# Patient Record
Sex: FEMALE | Race: WHITE | Employment: OTHER | URBAN - METROPOLITAN AREA
[De-identification: names, ages, dates, MRNs, and addresses within clinical notes are randomized per-mention and may not be internally consistent; named-entity substitution may affect disease eponyms.]

---

## 2017-02-27 ENCOUNTER — TELEPHONE (OUTPATIENT)
Dept: FAMILY MEDICINE CLINIC | Facility: CLINIC | Age: 67
End: 2017-02-27

## 2017-02-27 DIAGNOSIS — M25.50 ARTHRALGIA, UNSPECIFIED JOINT: ICD-10-CM
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
## 2017-03-21 ENCOUNTER — OFFICE VISIT (OUTPATIENT)
Dept: FAMILY MEDICINE CLINIC | Age: 67
End: 2017-03-21
Payer: MEDICARE

## 2017-03-21 VITALS
DIASTOLIC BLOOD PRESSURE: 82 MMHG | TEMPERATURE: 97.9 F | WEIGHT: 191 LBS | SYSTOLIC BLOOD PRESSURE: 118 MMHG | BODY MASS INDEX: 29.04 KG/M2

## 2017-03-21 DIAGNOSIS — K64.1 SECOND DEGREE HEMORRHOIDS: ICD-10-CM

## 2017-03-21 DIAGNOSIS — K62.5 RECTAL BLEEDING: Primary | ICD-10-CM

## 2017-03-21 LAB
HEMOCCULT STL QL: NORMAL

## 2017-03-21 PROCEDURE — 3017F COLORECTAL CA SCREEN DOC REV: CPT | Performed by: FAMILY MEDICINE

## 2017-03-21 PROCEDURE — 1090F PRES/ABSN URINE INCON ASSESS: CPT | Performed by: FAMILY MEDICINE

## 2017-03-21 PROCEDURE — 1123F ACP DISCUSS/DSCN MKR DOCD: CPT | Performed by: FAMILY MEDICINE

## 2017-03-21 PROCEDURE — 4040F PNEUMOC VAC/ADMIN/RCVD: CPT | Performed by: FAMILY MEDICINE

## 2017-03-21 PROCEDURE — G8420 CALC BMI NORM PARAMETERS: HCPCS | Performed by: FAMILY MEDICINE

## 2017-03-21 PROCEDURE — 3014F SCREEN MAMMO DOC REV: CPT | Performed by: FAMILY MEDICINE

## 2017-03-21 PROCEDURE — G8427 DOCREV CUR MEDS BY ELIG CLIN: HCPCS | Performed by: FAMILY MEDICINE

## 2017-03-21 PROCEDURE — 1036F TOBACCO NON-USER: CPT | Performed by: FAMILY MEDICINE

## 2017-03-21 PROCEDURE — G8400 PT W/DXA NO RESULTS DOC: HCPCS | Performed by: FAMILY MEDICINE

## 2017-03-21 PROCEDURE — 82270 OCCULT BLOOD FECES: CPT | Performed by: FAMILY MEDICINE

## 2017-03-21 PROCEDURE — G8484 FLU IMMUNIZE NO ADMIN: HCPCS | Performed by: FAMILY MEDICINE

## 2017-03-21 PROCEDURE — 99213 OFFICE O/P EST LOW 20 MIN: CPT | Performed by: FAMILY MEDICINE

## 2017-03-21 RX ORDER — BUDESONIDE AND FORMOTEROL FUMARATE DIHYDRATE 160; 4.5 UG/1; UG/1
AEROSOL RESPIRATORY (INHALATION)
COMMUNITY
Start: 2017-02-03 | End: 2019-02-14 | Stop reason: ALTCHOICE

## 2017-03-21 ASSESSMENT — ENCOUNTER SYMPTOMS
NAUSEA: 0
RECTAL PAIN: 0
VOMITING: 0
HEMATOCHEZIA: 1
SHORTNESS OF BREATH: 1
CONSTIPATION: 1
ABDOMINAL PAIN: 0
COUGH: 0
BLOOD IN STOOL: 1
DIARRHEA: 0
HEARTBURN: 0

## 2017-04-17 DIAGNOSIS — M25.50 ARTHRALGIA, UNSPECIFIED JOINT: ICD-10-CM
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
## 2017-04-20 ENCOUNTER — OFFICE VISIT (OUTPATIENT)
Dept: FAMILY MEDICINE CLINIC | Age: 67
End: 2017-04-20
Payer: MEDICARE

## 2017-04-20 VITALS
SYSTOLIC BLOOD PRESSURE: 138 MMHG | BODY MASS INDEX: 30.37 KG/M2 | HEART RATE: 72 BPM | WEIGHT: 189 LBS | DIASTOLIC BLOOD PRESSURE: 82 MMHG | HEIGHT: 66 IN

## 2017-04-20 DIAGNOSIS — M48.061 SPINAL STENOSIS AT L4-L5 LEVEL: ICD-10-CM

## 2017-04-20 DIAGNOSIS — Z12.31 SCREENING MAMMOGRAM, ENCOUNTER FOR: ICD-10-CM

## 2017-04-20 DIAGNOSIS — I36.1 NON-RHEUMATIC TRICUSPID VALVE INSUFFICIENCY: ICD-10-CM

## 2017-04-20 DIAGNOSIS — N95.1 MENOPAUSE SYNDROME: ICD-10-CM

## 2017-04-20 DIAGNOSIS — R29.898 WEAKNESS OF RIGHT LOWER EXTREMITY: Primary | ICD-10-CM

## 2017-04-20 PROCEDURE — G8400 PT W/DXA NO RESULTS DOC: HCPCS | Performed by: FAMILY MEDICINE

## 2017-04-20 PROCEDURE — 1090F PRES/ABSN URINE INCON ASSESS: CPT | Performed by: FAMILY MEDICINE

## 2017-04-20 PROCEDURE — G8427 DOCREV CUR MEDS BY ELIG CLIN: HCPCS | Performed by: FAMILY MEDICINE

## 2017-04-20 PROCEDURE — 1123F ACP DISCUSS/DSCN MKR DOCD: CPT | Performed by: FAMILY MEDICINE

## 2017-04-20 PROCEDURE — 4040F PNEUMOC VAC/ADMIN/RCVD: CPT | Performed by: FAMILY MEDICINE

## 2017-04-20 PROCEDURE — 3014F SCREEN MAMMO DOC REV: CPT | Performed by: FAMILY MEDICINE

## 2017-04-20 PROCEDURE — 3017F COLORECTAL CA SCREEN DOC REV: CPT | Performed by: FAMILY MEDICINE

## 2017-04-20 PROCEDURE — 99213 OFFICE O/P EST LOW 20 MIN: CPT | Performed by: FAMILY MEDICINE

## 2017-04-20 PROCEDURE — G8417 CALC BMI ABV UP PARAM F/U: HCPCS | Performed by: FAMILY MEDICINE

## 2017-04-20 PROCEDURE — 1036F TOBACCO NON-USER: CPT | Performed by: FAMILY MEDICINE

## 2017-04-20 ASSESSMENT — ENCOUNTER SYMPTOMS
BACK PAIN: 0
ABDOMINAL PAIN: 0
WHEEZING: 0
COUGH: 0
BLOOD IN STOOL: 0
HEMATOCHEZIA: 1
SHORTNESS OF BREATH: 1
EYES NEGATIVE: 1

## 2017-05-02 LAB
DLCO %PRED: NORMAL
DLCO PRE: NORMAL
FEF 25-75%-POST: NORMAL
FEF 25-75%-PRE: NORMAL
FEV1-POST: NORMAL
FEV1-PRE: NORMAL
FEV1/FVC-POST: NORMAL
FEV1/FVC-PRE: NORMAL
FVC-POST: NORMAL
FVC-PRE: NORMAL
MEP: NORMAL
MIP: NORMAL
TLC %PRED: NORMAL
TLC PRE: NORMAL

## 2017-05-28 RX ORDER — MELOXICAM 15 MG/1
TABLET ORAL
Qty: 90 TABLET | Refills: 0 | Status: SHIPPED | OUTPATIENT
Start: 2017-05-28 | End: 2017-08-16 | Stop reason: SDUPTHER

## 2017-08-07 ENCOUNTER — TELEPHONE (OUTPATIENT)
Dept: FAMILY MEDICINE CLINIC | Age: 67
End: 2017-08-07

## 2017-08-17 RX ORDER — MELOXICAM 15 MG/1
TABLET ORAL
Qty: 90 TABLET | Refills: 1 | Status: SHIPPED | OUTPATIENT
Start: 2017-08-17 | End: 2018-01-30 | Stop reason: SDUPTHER

## 2017-09-27 ENCOUNTER — HOSPITAL ENCOUNTER (OUTPATIENT)
Age: 67
Setting detail: SPECIMEN
Discharge: HOME | End: 2017-09-27
Payer: MEDICARE

## 2017-09-29 LAB — DERMATOLOGY PATHOLOGY REPORT: NORMAL

## 2018-01-31 RX ORDER — MELOXICAM 15 MG/1
TABLET ORAL
Qty: 90 TABLET | Refills: 0 | Status: SHIPPED | OUTPATIENT
Start: 2018-01-31 | End: 2018-04-21 | Stop reason: SDUPTHER

## 2018-01-31 NOTE — TELEPHONE ENCOUNTER
Pharmacy request  Health Maintenance   Topic Date Due    Hepatitis C screen  1950    Diabetes screen  01/01/1989    Zostavax vaccine  01/01/2009    Breast cancer screen  08/12/2013    DEXA (modify frequency per FRAX score)  01/01/2014    Pneumococcal low/med risk (1 of 2 - PCV13) 01/01/2014    Potassium monitoring  02/12/2016    Creatinine monitoring  02/12/2016    Flu vaccine (1) 09/01/2017    Colon Cancer Screen FIT/FOBT  03/21/2018    Lipid screen  08/06/2019    DTaP/Tdap/Td vaccine (3 - Td) 01/01/2023             (applicable per patient's age: Cancer Screenings, Depression Screening, Fall Risk Screening, Immunizations)    BUN (mg/dL)   Date Value   02/12/2015 19      (goal A1C is < 7)   (goal LDL is <100) need 30-50% reduction from baseline     BP Readings from Last 3 Encounters:   04/20/17 138/82   03/21/17 118/82   12/19/16 130/72    (goal /80)      All Future Testing planned in [facility]:  Lab Frequency Next Occurrence   ROSELYN Digital Screen Bilateral Once 08/05/2017   DEXA Bone Density Axial Skeleton Once 08/05/2017       Next Visit Date:  No future appointments.          Patient Active Problem List:     Total knee replacement status     Weakness of right lower extremity     Paroxysmal a-fib (HCC)     Primary osteoarthritis involving multiple joints     Dysthymia     Tricuspid regurgitation

## 2018-02-15 ENCOUNTER — OFFICE VISIT (OUTPATIENT)
Dept: FAMILY MEDICINE CLINIC | Age: 68
End: 2018-02-15
Payer: MEDICARE

## 2018-02-15 VITALS
BODY MASS INDEX: 26.98 KG/M2 | HEART RATE: 78 BPM | SYSTOLIC BLOOD PRESSURE: 134 MMHG | WEIGHT: 178 LBS | HEIGHT: 68 IN | DIASTOLIC BLOOD PRESSURE: 86 MMHG

## 2018-02-15 DIAGNOSIS — R06.09 DYSPNEA ON EXERTION: ICD-10-CM

## 2018-02-15 DIAGNOSIS — I48.0 PAROXYSMAL ATRIAL FIBRILLATION (HCC): ICD-10-CM

## 2018-02-15 DIAGNOSIS — M48.061 SPINAL STENOSIS AT L4-L5 LEVEL: ICD-10-CM

## 2018-02-15 DIAGNOSIS — G47.33 OBSTRUCTIVE SLEEP APNEA SYNDROME: ICD-10-CM

## 2018-02-15 DIAGNOSIS — S46.911A SHOULDER STRAIN, RIGHT, INITIAL ENCOUNTER: Primary | ICD-10-CM

## 2018-02-15 DIAGNOSIS — M15.9 PRIMARY OSTEOARTHRITIS INVOLVING MULTIPLE JOINTS: ICD-10-CM

## 2018-02-15 PROCEDURE — G8400 PT W/DXA NO RESULTS DOC: HCPCS | Performed by: FAMILY MEDICINE

## 2018-02-15 PROCEDURE — 1036F TOBACCO NON-USER: CPT | Performed by: FAMILY MEDICINE

## 2018-02-15 PROCEDURE — 99214 OFFICE O/P EST MOD 30 MIN: CPT | Performed by: FAMILY MEDICINE

## 2018-02-15 PROCEDURE — 1123F ACP DISCUSS/DSCN MKR DOCD: CPT | Performed by: FAMILY MEDICINE

## 2018-02-15 PROCEDURE — 3014F SCREEN MAMMO DOC REV: CPT | Performed by: FAMILY MEDICINE

## 2018-02-15 PROCEDURE — 4040F PNEUMOC VAC/ADMIN/RCVD: CPT | Performed by: FAMILY MEDICINE

## 2018-02-15 PROCEDURE — G8484 FLU IMMUNIZE NO ADMIN: HCPCS | Performed by: FAMILY MEDICINE

## 2018-02-15 PROCEDURE — 3017F COLORECTAL CA SCREEN DOC REV: CPT | Performed by: FAMILY MEDICINE

## 2018-02-15 PROCEDURE — G8419 CALC BMI OUT NRM PARAM NOF/U: HCPCS | Performed by: FAMILY MEDICINE

## 2018-02-15 PROCEDURE — G8427 DOCREV CUR MEDS BY ELIG CLIN: HCPCS | Performed by: FAMILY MEDICINE

## 2018-02-15 PROCEDURE — 1090F PRES/ABSN URINE INCON ASSESS: CPT | Performed by: FAMILY MEDICINE

## 2018-02-15 ASSESSMENT — ENCOUNTER SYMPTOMS
BACK PAIN: 1
EYES NEGATIVE: 1
ABDOMINAL PAIN: 0
COUGH: 0
CONSTIPATION: 0
SHORTNESS OF BREATH: 1

## 2018-02-15 ASSESSMENT — PATIENT HEALTH QUESTIONNAIRE - PHQ9
2. FEELING DOWN, DEPRESSED OR HOPELESS: 0
SUM OF ALL RESPONSES TO PHQ9 QUESTIONS 1 & 2: 0
1. LITTLE INTEREST OR PLEASURE IN DOING THINGS: 0
SUM OF ALL RESPONSES TO PHQ QUESTIONS 1-9: 0

## 2018-02-15 NOTE — PATIENT INSTRUCTIONS
Patient Education        Rotator Cuff: Exercises  Your Care Instructions  Here are some examples of typical rehabilitation exercises for your condition. Start each exercise slowly. Ease off the exercise if you start to have pain. Your doctor or physical therapist will tell you when you can start these exercises and which ones will work best for you. How to do the exercises  Pendulum swing    If you have pain in your back, do not do this exercise. 1. Hold on to a table or the back of a chair with your good arm. Then bend forward a little and let your sore arm hang straight down. This exercise does not use the arm muscles. Rather, use your legs and your hips to create movement that makes your arm swing freely. 2. Use the movement from your hips and legs to guide the slightly swinging arm back and forth like a pendulum (or elephant trunk). Then guide it in circles that start small (about the size of a dinner plate). Make the circles a bit larger each day, as your pain allows. 3. Do this exercise for 5 minutes, 5 to 7 times each day. 4. As you have less pain, try bending over a little farther to do this exercise. This will increase the amount of movement at your shoulder. Posterior stretching exercise    1. Hold the elbow of your injured arm with your other hand. 2. Use your hand to pull your injured arm gently up and across your body. You will feel a gentle stretch across the back of your injured shoulder. 3. Hold for at least 15 to 30 seconds. Then slowly lower your arm. 4. Repeat 2 to 4 times. Up-the-back stretch    Your doctor or physical therapist may want you to wait to do this stretch until you have regained most of your range of motion and strength. You can do this stretch in different ways. Hold any of these stretches for at least 15 to 30 seconds. Repeat them 2 to 4 times. 1. Put your hand in your back pocket. Let it rest there to stretch your shoulder.   2. With your other hand, hold your injured (to the side)    Avoid any movement that is straight to your side, and be careful not to arch your back. Your arm should stay about 30 degrees to the front of your side. 1. Stand with your side to a wall so that your fingers can just touch it at an angle about 30 degrees toward the front of your body. 2. Walk the fingers of your injured arm up the wall as high as pain permits. Try not to shrug your shoulder up toward your ear as you move your arm up. 3. Hold that position for a count of at least 15 to 20.  4. Walk your fingers back down to the starting position. 5. Repeat at least 2 to 4 times. Try to reach higher each time. Wall climbing (to the front)    During this stretching exercise, be careful not to arch your back. 1. Face a wall, and stand so your fingers can just touch it. 2. Keeping your shoulder down, walk the fingers of your injured arm up the wall as high as pain permits. (Don't shrug your shoulder up toward your ear.)  3. Hold your arm in that position for at least 15 to 30 seconds. 4. Slowly walk your fingers back down to where you started. 5. Repeat at least 2 to 4 times. Try to reach higher each time. Shoulder blade squeeze    1. Stand with your arms at your sides, and squeeze your shoulder blades together. Do not raise your shoulders up as you squeeze. 2. Hold 6 seconds. 3. Repeat 8 to 12 times. Scapular exercise: Arm reach    1. Lie flat on your back. This exercise is a very slight motion that starts with your arms raised (elbows straight, arms straight). 2. From this position, reach higher toward the marissa or ceiling. Keep your elbows straight. All motion should be from your shoulder blade only. 3. Relax your arms back to where you started. 4. Repeat 8 to 12 times. Arm raise to the side    During this strengthening exercise, your arm should stay about 30 degrees to the front of your side. 1. Slowly raise your injured arm to the side, with your thumb facing up.  Raise your arm 60

## 2018-02-15 NOTE — PROGRESS NOTES
counseling on the following healthy behaviors: nutrition, exercise and medication adherence  Reviewed prior labs and health maintenance. Continue current medications, diet and exercise. Discussed use, benefit, and side effects of prescribed medications. Barriers to medication compliance addressed. Patient given educational materials - see patient instructions. All patient questions answered. Patient voiced understanding. No Follow-up on file.         Electronically signed by Dinesh Corley MD on 2/15/18 at 2:20 PM

## 2018-03-13 ENCOUNTER — TELEPHONE (OUTPATIENT)
Dept: FAMILY MEDICINE CLINIC | Age: 68
End: 2018-03-13

## 2018-05-09 ENCOUNTER — TELEPHONE (OUTPATIENT)
Dept: PULMONOLOGY | Age: 68
End: 2018-05-09

## 2018-05-09 ENCOUNTER — OFFICE VISIT (OUTPATIENT)
Dept: PULMONOLOGY | Age: 68
End: 2018-05-09
Payer: MEDICARE

## 2018-05-09 VITALS
OXYGEN SATURATION: 99 % | HEIGHT: 68 IN | DIASTOLIC BLOOD PRESSURE: 76 MMHG | SYSTOLIC BLOOD PRESSURE: 135 MMHG | TEMPERATURE: 97 F | HEART RATE: 53 BPM | WEIGHT: 173 LBS | RESPIRATION RATE: 16 BRPM | BODY MASS INDEX: 26.22 KG/M2

## 2018-05-09 VITALS — BODY MASS INDEX: 26.22 KG/M2 | HEART RATE: 53 BPM | HEIGHT: 68 IN | OXYGEN SATURATION: 99 % | WEIGHT: 173 LBS

## 2018-05-09 DIAGNOSIS — R06.09 DYSPNEA ON EXERTION: ICD-10-CM

## 2018-05-09 DIAGNOSIS — I82.531 CHRONIC EMBOLISM AND THROMBOSIS OF RIGHT POPLITEAL VEIN (HCC): ICD-10-CM

## 2018-05-09 DIAGNOSIS — R06.02 SHORTNESS OF BREATH: Primary | ICD-10-CM

## 2018-05-09 DIAGNOSIS — G47.33 OSA ON CPAP: Primary | ICD-10-CM

## 2018-05-09 DIAGNOSIS — I82.433 EMBOLISM AND THROMBOSIS OF BOTH POPLITEAL VEINS (HCC): ICD-10-CM

## 2018-05-09 DIAGNOSIS — I82.4Y9 DEEP VEIN THROMBOSIS (DVT) OF PROXIMAL LOWER EXTREMITY, UNSPECIFIED CHRONICITY, UNSPECIFIED LATERALITY (HCC): ICD-10-CM

## 2018-05-09 DIAGNOSIS — I27.20 MODERATE TO SEVERE PULMONARY HYPERTENSION (HCC): ICD-10-CM

## 2018-05-09 DIAGNOSIS — I82.4Z3 DEEP VEIN THROMBOSIS (DVT) OF DISTAL VEIN OF BOTH LOWER EXTREMITIES, UNSPECIFIED CHRONICITY (HCC): Primary | ICD-10-CM

## 2018-05-09 DIAGNOSIS — Z99.89 OSA ON CPAP: Primary | ICD-10-CM

## 2018-05-09 PROCEDURE — 94726 PLETHYSMOGRAPHY LUNG VOLUMES: CPT | Performed by: INTERNAL MEDICINE

## 2018-05-09 PROCEDURE — 94729 DIFFUSING CAPACITY: CPT | Performed by: INTERNAL MEDICINE

## 2018-05-09 PROCEDURE — 94375 RESPIRATORY FLOW VOLUME LOOP: CPT | Performed by: INTERNAL MEDICINE

## 2018-05-09 PROCEDURE — 3017F COLORECTAL CA SCREEN DOC REV: CPT | Performed by: INTERNAL MEDICINE

## 2018-05-09 PROCEDURE — G8419 CALC BMI OUT NRM PARAM NOF/U: HCPCS | Performed by: INTERNAL MEDICINE

## 2018-05-09 PROCEDURE — 99205 OFFICE O/P NEW HI 60 MIN: CPT | Performed by: INTERNAL MEDICINE

## 2018-05-09 PROCEDURE — 1090F PRES/ABSN URINE INCON ASSESS: CPT | Performed by: INTERNAL MEDICINE

## 2018-05-09 PROCEDURE — G8427 DOCREV CUR MEDS BY ELIG CLIN: HCPCS | Performed by: INTERNAL MEDICINE

## 2018-05-10 PROBLEM — G47.33 OSA ON CPAP: Status: ACTIVE | Noted: 2018-05-10

## 2018-05-10 PROBLEM — I82.4Y9 DEEP VEIN THROMBOSIS (DVT) OF PROXIMAL LOWER EXTREMITY (HCC): Status: ACTIVE | Noted: 2018-05-10

## 2018-05-10 PROBLEM — I82.531 CHRONIC EMBOLISM AND THROMBOSIS OF RIGHT POPLITEAL VEIN (HCC): Status: ACTIVE | Noted: 2018-05-10

## 2018-05-10 PROBLEM — I82.433: Status: ACTIVE | Noted: 2018-05-10

## 2018-05-10 PROBLEM — Z99.89 OSA ON CPAP: Status: ACTIVE | Noted: 2018-05-10

## 2018-05-16 ENCOUNTER — HOSPITAL ENCOUNTER (OUTPATIENT)
Age: 68
Discharge: HOME | End: 2018-05-18
Payer: MEDICARE

## 2018-05-16 ENCOUNTER — HOSPITAL ENCOUNTER (OUTPATIENT)
Dept: VASCULAR LAB | Age: 68
Discharge: HOME | End: 2018-05-16
Payer: MEDICARE

## 2018-05-16 ENCOUNTER — HOSPITAL ENCOUNTER (OUTPATIENT)
Dept: NON INVASIVE DIAGNOSTICS | Age: 68
Discharge: HOME | End: 2018-05-16
Payer: MEDICARE

## 2018-05-16 ENCOUNTER — HOSPITAL ENCOUNTER (OUTPATIENT)
Dept: NUCLEAR MEDICINE | Age: 68
Discharge: HOME | End: 2018-05-18
Payer: MEDICARE

## 2018-05-16 ENCOUNTER — HOSPITAL ENCOUNTER (OUTPATIENT)
Dept: GENERAL RADIOLOGY | Age: 68
Discharge: HOME | End: 2018-05-18
Payer: MEDICARE

## 2018-05-16 ENCOUNTER — HOSPITAL ENCOUNTER (OUTPATIENT)
Age: 68
Discharge: HOME | End: 2018-05-16
Payer: MEDICARE

## 2018-05-16 DIAGNOSIS — I27.20 MODERATE TO SEVERE PULMONARY HYPERTENSION (HCC): ICD-10-CM

## 2018-05-16 DIAGNOSIS — R06.09 DYSPNEA ON EXERTION: ICD-10-CM

## 2018-05-16 LAB
ALLEN TEST: POSITIVE
FIO2: ABNORMAL
LV EF: 55 %
LVEF MODALITY: NORMAL
MODE: ABNORMAL
NEGATIVE BASE EXCESS, ART: ABNORMAL (ref 0–2)
O2 DEVICE/FLOW/%: ABNORMAL
PATIENT TEMP: ABNORMAL
POC HCO3: 25.6 MMOL/L (ref 21–28)
POC O2 SATURATION: 96 % (ref 94–98)
POC PCO2 TEMP: ABNORMAL MM HG
POC PCO2: 38.3 MM HG (ref 35–48)
POC PH TEMP: ABNORMAL
POC PH: 7.43 (ref 7.35–7.45)
POC PO2 TEMP: ABNORMAL MM HG
POC PO2: 78.9 MM HG (ref 83–108)
POSITIVE BASE EXCESS, ART: 1 (ref 0–3)
RHEUMATOID FACTOR: <10 IU/ML
SAMPLE SITE: ABNORMAL
TCO2 (CALC), ART: 27 MMOL/L (ref 22–29)

## 2018-05-16 PROCEDURE — 82803 BLOOD GASES ANY COMBINATION: CPT

## 2018-05-16 PROCEDURE — 71046 X-RAY EXAM CHEST 2 VIEWS: CPT

## 2018-05-16 PROCEDURE — 93306 TTE W/DOPPLER COMPLETE: CPT

## 2018-05-16 PROCEDURE — 86038 ANTINUCLEAR ANTIBODIES: CPT

## 2018-05-16 PROCEDURE — 78582 LUNG VENTILAT&PERFUS IMAGING: CPT

## 2018-05-16 PROCEDURE — A9540 TC99M MAA: HCPCS | Performed by: INTERNAL MEDICINE

## 2018-05-16 PROCEDURE — A9538 TC99M PYROPHOSPHATE: HCPCS | Performed by: INTERNAL MEDICINE

## 2018-05-16 PROCEDURE — 86431 RHEUMATOID FACTOR QUANT: CPT

## 2018-05-16 PROCEDURE — 93970 EXTREMITY STUDY: CPT

## 2018-05-16 PROCEDURE — 86235 NUCLEAR ANTIGEN ANTIBODY: CPT

## 2018-05-16 PROCEDURE — 3430000000 HC RX DIAGNOSTIC RADIOPHARMACEUTICAL: Performed by: INTERNAL MEDICINE

## 2018-05-16 PROCEDURE — 36415 COLL VENOUS BLD VENIPUNCTURE: CPT

## 2018-05-16 RX ADMIN — Medication 6 MILLICURIE: at 12:22

## 2018-05-16 RX ADMIN — Medication 41 MILLICURIE: at 11:55

## 2018-05-17 LAB
ANTI-NUCLEAR ANTIBODY (ANA): NEGATIVE
ANTI-SCLERODERMA: 8 U/ML

## 2018-05-23 ENCOUNTER — PROCEDURE VISIT (OUTPATIENT)
Dept: PULMONOLOGY | Age: 68
End: 2018-05-23
Payer: MEDICARE

## 2018-05-23 ENCOUNTER — OFFICE VISIT (OUTPATIENT)
Dept: PULMONOLOGY | Age: 68
End: 2018-05-23
Payer: MEDICARE

## 2018-05-23 VITALS
OXYGEN SATURATION: 99 % | TEMPERATURE: 97.6 F | DIASTOLIC BLOOD PRESSURE: 81 MMHG | HEART RATE: 60 BPM | RESPIRATION RATE: 14 BRPM | HEIGHT: 68 IN | SYSTOLIC BLOOD PRESSURE: 143 MMHG | WEIGHT: 173.6 LBS | BODY MASS INDEX: 26.31 KG/M2

## 2018-05-23 VITALS — HEART RATE: 56 BPM | WEIGHT: 173 LBS | OXYGEN SATURATION: 99 % | HEIGHT: 68 IN | BODY MASS INDEX: 26.22 KG/M2

## 2018-05-23 DIAGNOSIS — R53.81 PHYSICAL DECONDITIONING: ICD-10-CM

## 2018-05-23 DIAGNOSIS — R06.09 DYSPNEA ON EXERTION: Primary | ICD-10-CM

## 2018-05-23 DIAGNOSIS — Z99.89 OSA ON CPAP: ICD-10-CM

## 2018-05-23 DIAGNOSIS — G47.33 OSA ON CPAP: ICD-10-CM

## 2018-05-23 DIAGNOSIS — I27.20 MODERATE TO SEVERE PULMONARY HYPERTENSION (HCC): ICD-10-CM

## 2018-05-23 PROCEDURE — 94618 PULMONARY STRESS TESTING: CPT | Performed by: INTERNAL MEDICINE

## 2018-05-23 PROCEDURE — 3017F COLORECTAL CA SCREEN DOC REV: CPT | Performed by: INTERNAL MEDICINE

## 2018-05-23 PROCEDURE — G8419 CALC BMI OUT NRM PARAM NOF/U: HCPCS | Performed by: INTERNAL MEDICINE

## 2018-05-23 PROCEDURE — G8400 PT W/DXA NO RESULTS DOC: HCPCS | Performed by: INTERNAL MEDICINE

## 2018-05-23 PROCEDURE — G8427 DOCREV CUR MEDS BY ELIG CLIN: HCPCS | Performed by: INTERNAL MEDICINE

## 2018-05-23 PROCEDURE — 4040F PNEUMOC VAC/ADMIN/RCVD: CPT | Performed by: INTERNAL MEDICINE

## 2018-05-23 PROCEDURE — 1090F PRES/ABSN URINE INCON ASSESS: CPT | Performed by: INTERNAL MEDICINE

## 2018-05-23 PROCEDURE — 99215 OFFICE O/P EST HI 40 MIN: CPT | Performed by: INTERNAL MEDICINE

## 2018-05-23 PROCEDURE — 1036F TOBACCO NON-USER: CPT | Performed by: INTERNAL MEDICINE

## 2018-05-23 PROCEDURE — 1123F ACP DISCUSS/DSCN MKR DOCD: CPT | Performed by: INTERNAL MEDICINE

## 2019-02-14 ENCOUNTER — OFFICE VISIT (OUTPATIENT)
Dept: FAMILY MEDICINE CLINIC | Age: 69
End: 2019-02-14
Payer: MEDICARE

## 2019-02-14 VITALS
SYSTOLIC BLOOD PRESSURE: 112 MMHG | BODY MASS INDEX: 25.76 KG/M2 | HEART RATE: 60 BPM | WEIGHT: 170 LBS | DIASTOLIC BLOOD PRESSURE: 78 MMHG | HEIGHT: 68 IN

## 2019-02-14 DIAGNOSIS — M15.9 PRIMARY OSTEOARTHRITIS INVOLVING MULTIPLE JOINTS: ICD-10-CM

## 2019-02-14 DIAGNOSIS — M25.511 ACUTE PAIN OF RIGHT SHOULDER: Primary | ICD-10-CM

## 2019-02-14 DIAGNOSIS — R06.09 DYSPNEA ON EXERTION: ICD-10-CM

## 2019-02-14 DIAGNOSIS — I27.20 MODERATE TO SEVERE PULMONARY HYPERTENSION (HCC): ICD-10-CM

## 2019-02-14 DIAGNOSIS — Z12.31 ENCOUNTER FOR SCREENING MAMMOGRAM FOR BREAST CANCER: ICD-10-CM

## 2019-02-14 DIAGNOSIS — I48.0 PAROXYSMAL A-FIB (HCC): ICD-10-CM

## 2019-02-14 DIAGNOSIS — Z78.0 MENOPAUSE PRESENT: ICD-10-CM

## 2019-02-14 DIAGNOSIS — I82.531 CHRONIC EMBOLISM AND THROMBOSIS OF RIGHT POPLITEAL VEIN (HCC): ICD-10-CM

## 2019-02-14 PROCEDURE — 99214 OFFICE O/P EST MOD 30 MIN: CPT | Performed by: FAMILY MEDICINE

## 2019-02-14 RX ORDER — CEPHALEXIN 500 MG/1
CAPSULE ORAL
Qty: 4 CAPSULE | Refills: 3 | Status: SHIPPED | OUTPATIENT
Start: 2019-02-14 | End: 2019-10-10 | Stop reason: ALTCHOICE

## 2019-02-14 ASSESSMENT — ENCOUNTER SYMPTOMS
EYES NEGATIVE: 1
SHORTNESS OF BREATH: 0
COUGH: 0
ABDOMINAL PAIN: 0

## 2019-03-14 ENCOUNTER — HOSPITAL ENCOUNTER (OUTPATIENT)
Dept: MAMMOGRAPHY | Age: 69
Discharge: HOME | End: 2019-03-16
Payer: MEDICARE

## 2019-03-14 DIAGNOSIS — Z12.31 ENCOUNTER FOR SCREENING MAMMOGRAM FOR BREAST CANCER: ICD-10-CM

## 2019-03-14 DIAGNOSIS — Z78.0 MENOPAUSE PRESENT: ICD-10-CM

## 2019-03-14 PROCEDURE — 77067 SCR MAMMO BI INCL CAD: CPT

## 2019-03-14 PROCEDURE — 77080 DXA BONE DENSITY AXIAL: CPT

## 2019-04-05 ENCOUNTER — TELEPHONE (OUTPATIENT)
Dept: FAMILY MEDICINE CLINIC | Age: 69
End: 2019-04-05

## 2019-04-05 RX ORDER — MELOXICAM 15 MG/1
TABLET ORAL
Qty: 90 TABLET | Refills: 1 | Status: SHIPPED | OUTPATIENT
Start: 2019-04-05 | End: 2020-01-23

## 2019-04-05 NOTE — TELEPHONE ENCOUNTER
Health Maintenance   Topic Date Due    Hepatitis C screen  1950    Diabetes screen  01/01/1989    Shingles Vaccine (1 of 2) 01/01/1999    Pneumococcal 65+ years Vaccine (1 of 2 - PCV13) 01/01/2014    Potassium monitoring  02/12/2016    Creatinine monitoring  02/12/2016    Colon Cancer Screen FIT/FOBT  03/21/2018    Lipid screen  08/06/2019    Flu vaccine (Season Ended) 09/01/2019    Breast cancer screen  03/14/2021    DTaP/Tdap/Td vaccine (3 - Td) 01/01/2023    DEXA (modify frequency per FRAX score)  Completed             (applicable per patient's age: Cancer Screenings, Depression Screening, Fall Risk Screening, Immunizations)    BUN (mg/dL)   Date Value   02/12/2015 19      (goal A1C is < 7)   (goal LDL is <100) need 30-50% reduction from baseline     BP Readings from Last 3 Encounters:   02/14/19 112/78   05/23/18 (!) 143/81   05/09/18 135/76    (goal /80)      All Future Testing planned in [facility]:  Lab Frequency Next Occurrence   ECHO Complete With Bubble Study Once 05/09/2018   6 MIN WALK TEST Once 05/10/2018       Next Visit Date:  Future Appointments   Date Time Provider Meghna Kruger   5/23/2019  3:00 PM Juan Pablo Aparicio MD Resp Spec [facility]            Patient Active Problem List:     Paroxysmal A-fib [facility])     Primary osteoarthritis involving multiple joints     Dysthymia     Tricuspid regurgitation     Osteoarthritis     Spinal stenosis at L4-L5 level     Dyspnea on exertion     Moderate to severe pulmonary hypertension (HCC)     JOSE on CPAP     Deep vein thrombosis (DVT) of proximal lower extremity ([address]     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

## 2019-04-05 NOTE — TELEPHONE ENCOUNTER
Pt stated she did get her dexa scan results and is asking if the calcium and vit d can just be a medication otc she is going to talk to pharmacy and ask what is recommended.   Health Maintenance   Topic Date Due    Hepatitis C screen  1950    Diabetes screen  01/01/1989    Shingles Vaccine (1 of 2) 01/01/1999    Pneumococcal 65+ years Vaccine (1 of 2 - PCV13) 01/01/2014    Potassium monitoring  02/12/2016    Creatinine monitoring  02/12/2016    Colon Cancer Screen FIT/FOBT  03/21/2018    Lipid screen  08/06/2019    Flu vaccine (Season Ended) 09/01/2019    Breast cancer screen  03/14/2021    DTaP/Tdap/Td vaccine (3 - Td) 01/01/2023    DEXA (modify frequency per FRAX score)  Completed             (applicable per patient's age: Cancer Screenings, Depression Screening, Fall Risk Screening, Immunizations)    BUN (mg/dL)   Date Value   02/12/2015 19      (goal A1C is < 7)   (goal LDL is <100) need 30-50% reduction from baseline     BP Readings from Last 3 Encounters:   02/14/19 112/78   05/23/18 (!) 143/81   05/09/18 135/76    (goal /80)      All Future Testing planned in [facility]:  Lab Frequency Next Occurrence   ECHO Complete With Bubble Study Once 05/09/2018   6 MIN WALK TEST Once 05/10/2018       Next Visit Date:  Future Appointments   Date Time Provider Meghna Kruger   5/23/2019  3:00 PM [facility]            Patient Active Problem List:     Paroxysmal A-fib [facility])     Primary osteoarthritis involving multiple joints     Dysthymia     Tricuspid regurgitation     Osteoarthritis     Spinal stenosis at L4-L5 level     Dyspnea on exertion     Moderate to severe pulmonary hypertension (HCC)     JOSE on CPAP     Deep vein thrombosis (DVT) of proximal lower extremity ([address]     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

## 2019-06-12 ENCOUNTER — TELEPHONE (OUTPATIENT)
Dept: FAMILY MEDICINE CLINIC | Age: 69
End: 2019-06-12

## 2019-06-12 DIAGNOSIS — J30.2 SEASONAL ALLERGIC RHINITIS, UNSPECIFIED TRIGGER: Primary | ICD-10-CM

## 2019-06-13 NOTE — TELEPHONE ENCOUNTER
Tried calling several times to give her the contact info for allergist referral and there was no answer and no voice mail

## 2019-10-10 RX ORDER — M-VIT,TX,IRON,MINS/CALC/FOLIC 27MG-0.4MG
1 TABLET ORAL DAILY
COMMUNITY

## 2019-10-10 RX ORDER — FUROSEMIDE 40 MG/1
40 TABLET ORAL DAILY
COMMUNITY
End: 2022-03-09

## 2019-10-10 RX ORDER — LANOLIN ALCOHOL/MO/W.PET/CERES
1000 CREAM (GRAM) TOPICAL DAILY
COMMUNITY
End: 2020-06-23

## 2019-10-10 RX ORDER — ALBUTEROL SULFATE 90 UG/1
2 AEROSOL, METERED RESPIRATORY (INHALATION) EVERY 4 HOURS PRN
COMMUNITY
End: 2019-10-11

## 2019-10-10 RX ORDER — SOTALOL HYDROCHLORIDE 80 MG/1
80 TABLET ORAL 2 TIMES DAILY
Status: ON HOLD | COMMUNITY
End: 2022-04-13 | Stop reason: HOSPADM

## 2019-10-11 ENCOUNTER — HOSPITAL ENCOUNTER (OUTPATIENT)
Dept: CARDIAC CATH/INVASIVE PROCEDURES | Age: 69
Discharge: HOME | End: 2019-10-11
Attending: INTERNAL MEDICINE | Admitting: INTERNAL MEDICINE
Payer: MEDICARE

## 2019-10-11 VITALS
SYSTOLIC BLOOD PRESSURE: 146 MMHG | TEMPERATURE: 97.6 F | BODY MASS INDEX: 24.73 KG/M2 | HEART RATE: 52 BPM | OXYGEN SATURATION: 100 % | DIASTOLIC BLOOD PRESSURE: 67 MMHG | RESPIRATION RATE: 16 BRPM | HEIGHT: 69 IN | WEIGHT: 167 LBS

## 2019-10-11 DIAGNOSIS — Z98.890 STATUS POST CARDIAC CATHETERIZATION: ICD-10-CM

## 2019-10-11 LAB
EKG ATRIAL RATE: 48 BPM
EKG P AXIS: 43 DEGREES
EKG P-R INTERVAL: 156 MS
EKG Q-T INTERVAL: 482 MS
EKG QRS DURATION: 94 MS
EKG QTC CALCULATION (BAZETT): 430 MS
EKG R AXIS: 4 DEGREES
EKG T AXIS: 34 DEGREES
EKG VENTRICULAR RATE: 48 BPM

## 2019-10-11 PROCEDURE — C1751 CATH, INF, PER/CENT/MIDLINE: HCPCS

## 2019-10-11 PROCEDURE — 2500000003 HC RX 250 WO HCPCS

## 2019-10-11 PROCEDURE — C1894 INTRO/SHEATH, NON-LASER: HCPCS

## 2019-10-11 PROCEDURE — 93005 ELECTROCARDIOGRAM TRACING: CPT

## 2019-10-11 PROCEDURE — 2580000003 HC RX 258: Performed by: INTERNAL MEDICINE

## 2019-10-11 PROCEDURE — 6360000004 HC RX CONTRAST MEDICATION

## 2019-10-11 PROCEDURE — C1769 GUIDE WIRE: HCPCS

## 2019-10-11 PROCEDURE — 6360000002 HC RX W HCPCS

## 2019-10-11 PROCEDURE — 93451 RIGHT HEART CATH: CPT | Performed by: INTERNAL MEDICINE

## 2019-10-11 RX ORDER — ONDANSETRON 2 MG/ML
4 INJECTION INTRAMUSCULAR; INTRAVENOUS EVERY 6 HOURS PRN
Status: DISCONTINUED | OUTPATIENT
Start: 2019-10-11 | End: 2019-10-11 | Stop reason: HOSPADM

## 2019-10-11 RX ORDER — SODIUM CHLORIDE 9 MG/ML
INJECTION, SOLUTION INTRAVENOUS CONTINUOUS
Status: DISCONTINUED | OUTPATIENT
Start: 2019-10-11 | End: 2019-10-11 | Stop reason: HOSPADM

## 2019-10-11 RX ORDER — ASPIRIN 81 MG/1
81 TABLET, CHEWABLE ORAL ONCE
Status: DISCONTINUED | OUTPATIENT
Start: 2019-10-11 | End: 2019-10-11

## 2019-10-11 RX ORDER — SODIUM CHLORIDE 0.9 % (FLUSH) 0.9 %
10 SYRINGE (ML) INJECTION PRN
Status: DISCONTINUED | OUTPATIENT
Start: 2019-10-11 | End: 2019-10-11 | Stop reason: HOSPADM

## 2019-10-11 RX ORDER — ONDANSETRON 4 MG/1
4 TABLET, ORALLY DISINTEGRATING ORAL EVERY 6 HOURS PRN
Status: DISCONTINUED | OUTPATIENT
Start: 2019-10-11 | End: 2019-10-11 | Stop reason: HOSPADM

## 2019-10-11 RX ORDER — FENTANYL CITRATE 50 UG/ML
25 INJECTION, SOLUTION INTRAMUSCULAR; INTRAVENOUS
Status: DISCONTINUED | OUTPATIENT
Start: 2019-10-11 | End: 2019-10-11 | Stop reason: HOSPADM

## 2019-10-11 RX ORDER — ONDANSETRON 2 MG/ML
4 INJECTION INTRAMUSCULAR; INTRAVENOUS EVERY 6 HOURS PRN
Status: DISCONTINUED | OUTPATIENT
Start: 2019-10-11 | End: 2019-10-11

## 2019-10-11 RX ORDER — SODIUM CHLORIDE 0.9 % (FLUSH) 0.9 %
10 SYRINGE (ML) INJECTION EVERY 12 HOURS SCHEDULED
Status: DISCONTINUED | OUTPATIENT
Start: 2019-10-11 | End: 2019-10-11 | Stop reason: HOSPADM

## 2019-10-11 RX ORDER — ACETAMINOPHEN 325 MG/1
650 TABLET ORAL EVERY 4 HOURS PRN
Status: DISCONTINUED | OUTPATIENT
Start: 2019-10-11 | End: 2019-10-11 | Stop reason: HOSPADM

## 2019-10-11 RX ADMIN — SODIUM CHLORIDE: 9 INJECTION, SOLUTION INTRAVENOUS at 14:30

## 2019-10-11 ASSESSMENT — PAIN - FUNCTIONAL ASSESSMENT: PAIN_FUNCTIONAL_ASSESSMENT: 0-10

## 2019-11-01 ENCOUNTER — TELEPHONE (OUTPATIENT)
Dept: FAMILY MEDICINE CLINIC | Age: 69
End: 2019-11-01

## 2019-12-10 ENCOUNTER — TELEPHONE (OUTPATIENT)
Dept: FAMILY MEDICINE CLINIC | Age: 69
End: 2019-12-10

## 2020-02-25 RX ORDER — CEPHALEXIN 500 MG/1
CAPSULE ORAL
Qty: 4 CAPSULE | Refills: 2 | Status: SHIPPED | OUTPATIENT
Start: 2020-02-25 | End: 2020-06-23

## 2020-04-06 ENCOUNTER — TELEPHONE (OUTPATIENT)
Dept: FAMILY MEDICINE CLINIC | Age: 70
End: 2020-04-06

## 2020-05-05 ENCOUNTER — TELEPHONE (OUTPATIENT)
Dept: FAMILY MEDICINE CLINIC | Age: 70
End: 2020-05-05

## 2020-06-23 ENCOUNTER — OFFICE VISIT (OUTPATIENT)
Dept: FAMILY MEDICINE CLINIC | Age: 70
End: 2020-06-23
Payer: MEDICARE

## 2020-06-23 VITALS
HEART RATE: 64 BPM | HEIGHT: 68 IN | SYSTOLIC BLOOD PRESSURE: 122 MMHG | TEMPERATURE: 97.2 F | BODY MASS INDEX: 24.55 KG/M2 | DIASTOLIC BLOOD PRESSURE: 68 MMHG | WEIGHT: 162 LBS

## 2020-06-23 PROBLEM — I47.1 SUPRAVENTRICULAR TACHYCARDIA (HCC): Status: ACTIVE | Noted: 2020-06-23

## 2020-06-23 PROBLEM — J98.4 RESTRICTIVE LUNG DISEASE: Status: ACTIVE | Noted: 2020-06-23

## 2020-06-23 PROBLEM — G62.9 PERIPHERAL POLYNEUROPATHY: Status: ACTIVE | Noted: 2020-06-23

## 2020-06-23 PROBLEM — J98.4 PULMONARY SCARRING: Status: ACTIVE | Noted: 2020-06-23

## 2020-06-23 PROBLEM — M43.17 SPONDYLOLISTHESIS AT L5-S1 LEVEL: Status: ACTIVE | Noted: 2020-06-23

## 2020-06-23 PROBLEM — G60.9 IDIOPATHIC PERIPHERAL NEUROPATHY: Status: ACTIVE | Noted: 2020-06-23

## 2020-06-23 PROBLEM — I50.32 CHRONIC DIASTOLIC (CONGESTIVE) HEART FAILURE (HCC): Status: ACTIVE | Noted: 2020-06-23

## 2020-06-23 PROBLEM — R26.89 LIMP: Status: ACTIVE | Noted: 2020-06-23

## 2020-06-23 PROBLEM — N18.30 CHRONIC KIDNEY DISEASE, STAGE III (MODERATE) (HCC): Status: ACTIVE | Noted: 2020-06-23

## 2020-06-23 PROBLEM — M40.204 KYPHOSIS OF THORACIC REGION: Status: ACTIVE | Noted: 2020-06-23

## 2020-06-23 PROBLEM — M77.9 ENTHESOPATHY: Status: ACTIVE | Noted: 2020-06-23

## 2020-06-23 PROBLEM — I38 VALVULAR HEART DISEASE: Status: ACTIVE | Noted: 2020-06-23

## 2020-06-23 PROBLEM — R26.9 GAIT DISTURBANCE: Status: ACTIVE | Noted: 2020-06-23

## 2020-06-23 PROBLEM — J45.909 ASTHMATIC BRONCHITIS, UNSPECIFIED ASTHMA SEVERITY, UNCOMPLICATED: Status: ACTIVE | Noted: 2020-06-23

## 2020-06-23 PROBLEM — K21.9 LARYNGOPHARYNGEAL REFLUX (LPR): Status: ACTIVE | Noted: 2020-06-23

## 2020-06-23 PROBLEM — M17.9 OSTEOARTHRITIS OF KNEE: Status: ACTIVE | Noted: 2020-06-23

## 2020-06-23 PROBLEM — M16.11 PRIMARY OSTEOARTHRITIS OF RIGHT HIP: Status: ACTIVE | Noted: 2020-06-23

## 2020-06-23 PROBLEM — I47.10 SUPRAVENTRICULAR TACHYCARDIA: Status: ACTIVE | Noted: 2020-06-23

## 2020-06-23 PROBLEM — M70.61 TROCHANTERIC BURSITIS OF RIGHT HIP: Status: ACTIVE | Noted: 2020-06-23

## 2020-06-23 PROBLEM — I10 ESSENTIAL HYPERTENSION: Status: ACTIVE | Noted: 2020-06-23

## 2020-06-23 PROBLEM — M21.70 LEG LENGTH DISCREPANCY: Status: ACTIVE | Noted: 2020-06-23

## 2020-06-23 PROBLEM — M47.816 SPONDYLOSIS OF LUMBAR REGION WITHOUT MYELOPATHY OR RADICULOPATHY: Status: ACTIVE | Noted: 2020-06-23

## 2020-06-23 PROCEDURE — G8427 DOCREV CUR MEDS BY ELIG CLIN: HCPCS | Performed by: FAMILY MEDICINE

## 2020-06-23 PROCEDURE — 1090F PRES/ABSN URINE INCON ASSESS: CPT | Performed by: FAMILY MEDICINE

## 2020-06-23 PROCEDURE — 1123F ACP DISCUSS/DSCN MKR DOCD: CPT | Performed by: FAMILY MEDICINE

## 2020-06-23 PROCEDURE — 99213 OFFICE O/P EST LOW 20 MIN: CPT | Performed by: FAMILY MEDICINE

## 2020-06-23 PROCEDURE — G8510 SCR DEP NEG, NO PLAN REQD: HCPCS | Performed by: FAMILY MEDICINE

## 2020-06-23 PROCEDURE — G8420 CALC BMI NORM PARAMETERS: HCPCS | Performed by: FAMILY MEDICINE

## 2020-06-23 PROCEDURE — 3017F COLORECTAL CA SCREEN DOC REV: CPT | Performed by: FAMILY MEDICINE

## 2020-06-23 PROCEDURE — G8399 PT W/DXA RESULTS DOCUMENT: HCPCS | Performed by: FAMILY MEDICINE

## 2020-06-23 PROCEDURE — 1036F TOBACCO NON-USER: CPT | Performed by: FAMILY MEDICINE

## 2020-06-23 PROCEDURE — 4040F PNEUMOC VAC/ADMIN/RCVD: CPT | Performed by: FAMILY MEDICINE

## 2020-06-23 ASSESSMENT — PATIENT HEALTH QUESTIONNAIRE - PHQ9
SUM OF ALL RESPONSES TO PHQ QUESTIONS 1-9: 0
2. FEELING DOWN, DEPRESSED OR HOPELESS: 0
SUM OF ALL RESPONSES TO PHQ QUESTIONS 1-9: 0
1. LITTLE INTEREST OR PLEASURE IN DOING THINGS: 0
SUM OF ALL RESPONSES TO PHQ9 QUESTIONS 1 & 2: 0

## 2020-06-23 ASSESSMENT — ENCOUNTER SYMPTOMS
ALLERGIC/IMMUNOLOGIC NEGATIVE: 1
EYES NEGATIVE: 1
COUGH: 0
ABDOMINAL PAIN: 0
SHORTNESS OF BREATH: 0

## 2020-06-23 NOTE — PROGRESS NOTES
rhythm. Heart sounds: Normal heart sounds. No murmur. Pulmonary:      Effort: Pulmonary effort is normal.      Breath sounds: Normal breath sounds. No wheezing or rales. Abdominal:      Palpations: Abdomen is soft. Tenderness: There is no abdominal tenderness. There is no guarding or rebound. Musculoskeletal:         General: Tenderness (rt anterior shoulder) and deformity (rt shoulder reduced range of motion) present. Lymphadenopathy:      Cervical: No cervical adenopathy. Skin:     General: Skin is warm and dry. Neurological:      Mental Status: She is alert and oriented to person, place, and time. Psychiatric:         Behavior: Behavior normal.         Assessment/PLan  1. Chronic right shoulder pain  Cont gentle range of motion. See ortho if not improving.   - External Referral To Physical Therapy    2. Paroxysmal A-fib ([address]  Cont seeing cardiologist. Finn Peck event monitor. 3. Primary osteoarthritis involving multiple joints  Cont to stay active. Asif Aldridge received counseling on the following healthy behaviors: nutrition, exercise and medication adherence  Reviewed prior labs and health maintenance. Continue current medications, diet and exercise. Discussed use, benefit, and side effects of prescribed medications. Barriers to medication compliance addressed. Patient given educational materials - see patient instructions. All patient questions answered. Patient voiced understanding. Return if symptoms worsen or fail to improve.         Electronically signed by Jean Singleton MD on 6/23/20 at 11:32 AM EDT

## 2020-08-04 ENCOUNTER — TELEPHONE (OUTPATIENT)
Dept: FAMILY MEDICINE CLINIC | Age: 70
End: 2020-08-04

## 2020-08-04 NOTE — TELEPHONE ENCOUNTER
Pt states she needs an order or referral to see occupational therapy to get a new thumb brace. She states she was fitted for 1 over 2 years ago and it has broken. She does not remember where she went to get but is thinking something with occupational health.    She is going to PT on 08/05/2020 and is going to see [place] have any idea of where she might be able to get fitted for a new one

## 2020-08-05 NOTE — TELEPHONE ENCOUNTER
Tried calling patient no answer and no v/m please try again later. Please give her the number [phone].  Faxed referral already

## 2020-11-03 RX ORDER — MELOXICAM 15 MG/1
TABLET ORAL
Qty: 90 TABLET | Refills: 1 | Status: SHIPPED
Start: 2020-11-03 | End: 2021-06-14 | Stop reason: ALTCHOICE

## 2020-11-03 NOTE — TELEPHONE ENCOUNTER
Gait disturbance     Idiopathic peripheral neuropathy     Laryngopharyngeal reflux (LPR)     Leg length discrepancy     Limp     Peripheral polyneuropathy     Primary osteoarthritis of right hip     Pulmonary scarring     Restrictive lung disease     Enthesopathy     Kyphosis of thoracic region     Spondylolisthesis at L5-S1 level     Spondylosis of lumbar region without myelopathy or radiculopathy     Supraventricular tachycardia (HCC)     Trochanteric bursitis of right hip     Valvular heart disease     Osteoarthritis of knee

## 2021-03-09 ENCOUNTER — HOSPITAL ENCOUNTER (OUTPATIENT)
Dept: MAMMOGRAPHY | Age: 71
Discharge: HOME | End: 2021-03-11
Payer: MEDICARE

## 2021-03-09 DIAGNOSIS — Z12.31 VISIT FOR SCREENING MAMMOGRAM: ICD-10-CM

## 2021-03-09 PROCEDURE — 77063 BREAST TOMOSYNTHESIS BI: CPT

## 2021-03-11 ENCOUNTER — TELEPHONE (OUTPATIENT)
Dept: FAMILY MEDICINE CLINIC | Age: 71
End: 2021-03-11

## 2021-03-11 DIAGNOSIS — Z12.11 ENCOUNTER FOR SCREENING COLONOSCOPY: Primary | ICD-10-CM

## 2021-03-11 NOTE — TELEPHONE ENCOUNTER
Lov: 06/23/2020  Nov: 04/12/2021    Keli Liu is requesting an order for a Cologuard, patient has been losing weight over a 6 month period.  She'd like to do a Cologuard first, hopefully before her visit in April

## 2021-04-06 ENCOUNTER — TELEPHONE (OUTPATIENT)
Dept: FAMILY MEDICINE CLINIC | Age: 71
End: 2021-04-06

## 2021-04-06 DIAGNOSIS — R19.5 POSITIVE COLORECTAL CANCER SCREENING USING COLOGUARD TEST: ICD-10-CM

## 2021-04-06 DIAGNOSIS — Z12.11 ENCOUNTER FOR SCREENING COLONOSCOPY: ICD-10-CM

## 2021-04-06 DIAGNOSIS — Z12.11 SCREEN FOR COLON CANCER: Primary | ICD-10-CM

## 2021-04-06 RX ORDER — CEPHALEXIN 500 MG/1
CAPSULE ORAL
Qty: 4 CAPSULE | Refills: 0 | Status: SHIPPED | OUTPATIENT
Start: 2021-04-06 | End: 2021-04-12 | Stop reason: ALTCHOICE

## 2021-04-06 NOTE — TELEPHONE ENCOUNTER
Pharm requested refill    Health Maintenance   Topic Date Due    Hepatitis C screen  Never done    Shingles Vaccine (1 of 2) Never done    Pneumococcal 65+ years Vaccine (1 of 1 - PPSV23) Never done    Potassium monitoring  02/12/2016    Creatinine monitoring  02/12/2016    Colon Cancer Screen FIT/FOBT  03/21/2018    Annual Wellness Visit (AWV)  Never done    Lipid screen  08/06/2019    Flu vaccine (Season Ended) 09/01/2021    DTaP/Tdap/Td vaccine (3 - Td) 01/01/2023    Breast cancer screen  03/09/2023    DEXA (modify frequency per FRAX score)  Completed    COVID-19 Vaccine  Completed    Hepatitis A vaccine  Aged Out    Hepatitis B vaccine  Aged Out    Hib vaccine  Aged Out    Meningococcal (ACWY) vaccine  Aged Out             (applicable per patient's age: Cancer Screenings, Depression Screening, Fall Risk Screening, Immunizations)    BUN (mg/dL)   Date Value   02/12/2015 19      (goal A1C is < 7)   (goal LDL is <100) need 30-50% reduction from baseline     BP Readings from Last 3 Encounters:   06/23/20 122/68   10/11/19 (!) 146/67   02/14/19 112/78    (goal /80)      All Future Testing planned in [facility]:  Lab Frequency Next Occurrence       Next Visit Date:  Future Appointments   Date Time Provider Meghna Kruger   4/12/2021  9:45 AM MD silva Wadsworth  [id number]            Patient Active Problem List:     Paroxysmal A-fib ([address]     Primary osteoarthritis involving multiple joints     Dysthymia     Tricuspid regurgitation     Osteoarthritis     Spinal stenosis at L4-L5 level     Dyspnea on exertion     Moderate to severe pulmonary hypertension (HCC)     JOSE on CPAP     Deep vein thrombosis (DVT) of proximal lower extremity (HCC)     Chronic 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)      Embolism and thrombosis of both popliteal veins (HCC)      Status post cardiac catheterization     Asthmatic bronchitis, unspecified asthma severity, uncomplicated     Balance problems     Chronic diastolic (congestive) heart failure (HCC)     Chronic kidney disease, stage III (moderate)     Essential hypertension     Gait disturbance     Idiopathic peripheral neuropathy     Laryngopharyngeal reflux (LPR)     Leg length discrepancy     Limp     Peripheral polyneuropathy     Primary osteoarthritis of right hip     Pulmonary scarring     Restrictive lung disease     Enthesopathy     Kyphosis of thoracic region     Spondylolisthesis at L5-S1 level     Spondylosis of lumbar region without myelopathy or radiculopathy     Supraventricular tachycardia (HCC)     Trochanteric bursitis of right hip     Valvular heart disease     Osteoarthritis of knee

## 2021-04-06 NOTE — TELEPHONE ENCOUNTER
Ton Arceo wants to go to [address] [place] [address] or [facility].      Referral Pended to [facility]   (Dr. Roni Lakhani)

## 2021-04-12 ENCOUNTER — OFFICE VISIT (OUTPATIENT)
Dept: FAMILY MEDICINE CLINIC | Age: 71
End: 2021-04-12
Payer: MEDICARE

## 2021-04-12 VITALS
SYSTOLIC BLOOD PRESSURE: 100 MMHG | WEIGHT: 154.4 LBS | DIASTOLIC BLOOD PRESSURE: 60 MMHG | HEART RATE: 84 BPM | TEMPERATURE: 98 F | OXYGEN SATURATION: 98 % | BODY MASS INDEX: 23.48 KG/M2

## 2021-04-12 DIAGNOSIS — I48.0 PAROXYSMAL A-FIB (HCC): ICD-10-CM

## 2021-04-12 DIAGNOSIS — R63.4 WEIGHT LOSS, NON-INTENTIONAL: Primary | ICD-10-CM

## 2021-04-12 DIAGNOSIS — H61.23 BILATERAL IMPACTED CERUMEN: ICD-10-CM

## 2021-04-12 DIAGNOSIS — R42 DIZZINESS: ICD-10-CM

## 2021-04-12 LAB
APPEARANCE FLUID: CLEAR
BILIRUBIN, POC: NEGATIVE
BLOOD URINE, POC: NEGATIVE
CLARITY, POC: CLEAR
COLOR, POC: YELLOW
GLUCOSE URINE, POC: NEGATIVE
KETONES, POC: NEGATIVE
LEUKOCYTE EST, POC: NEGATIVE
NITRITE, POC: NEGATIVE
PH, POC: 5.5
PROTEIN, POC: NEGATIVE
SPECIFIC GRAVITY, POC: 1.02
UROBILINOGEN, POC: 0.2

## 2021-04-12 PROCEDURE — 4040F PNEUMOC VAC/ADMIN/RCVD: CPT | Performed by: FAMILY MEDICINE

## 2021-04-12 PROCEDURE — G8399 PT W/DXA RESULTS DOCUMENT: HCPCS | Performed by: FAMILY MEDICINE

## 2021-04-12 PROCEDURE — 99214 OFFICE O/P EST MOD 30 MIN: CPT | Performed by: FAMILY MEDICINE

## 2021-04-12 PROCEDURE — 3017F COLORECTAL CA SCREEN DOC REV: CPT | Performed by: FAMILY MEDICINE

## 2021-04-12 PROCEDURE — 1123F ACP DISCUSS/DSCN MKR DOCD: CPT | Performed by: FAMILY MEDICINE

## 2021-04-12 PROCEDURE — G8420 CALC BMI NORM PARAMETERS: HCPCS | Performed by: FAMILY MEDICINE

## 2021-04-12 PROCEDURE — 1036F TOBACCO NON-USER: CPT | Performed by: FAMILY MEDICINE

## 2021-04-12 PROCEDURE — G8427 DOCREV CUR MEDS BY ELIG CLIN: HCPCS | Performed by: FAMILY MEDICINE

## 2021-04-12 PROCEDURE — 1090F PRES/ABSN URINE INCON ASSESS: CPT | Performed by: FAMILY MEDICINE

## 2021-04-12 PROCEDURE — 81002 URINALYSIS NONAUTO W/O SCOPE: CPT | Performed by: FAMILY MEDICINE

## 2021-04-12 PROCEDURE — 69210 REMOVE IMPACTED EAR WAX UNI: CPT | Performed by: FAMILY MEDICINE

## 2021-04-12 SDOH — ECONOMIC STABILITY: INCOME INSECURITY: HOW HARD IS IT FOR YOU TO PAY FOR THE VERY BASICS LIKE FOOD, HOUSING, MEDICAL CARE, AND HEATING?: NOT HARD AT ALL

## 2021-04-12 SDOH — ECONOMIC STABILITY: FOOD INSECURITY: WITHIN THE PAST 12 MONTHS, THE FOOD YOU BOUGHT JUST DIDN'T LAST AND YOU DIDN'T HAVE MONEY TO GET MORE.: NEVER TRUE

## 2021-04-12 SDOH — ECONOMIC STABILITY: TRANSPORTATION INSECURITY
IN THE PAST 12 MONTHS, HAS THE LACK OF TRANSPORTATION KEPT YOU FROM MEDICAL APPOINTMENTS OR FROM GETTING MEDICATIONS?: NO

## 2021-04-12 ASSESSMENT — ENCOUNTER SYMPTOMS
SHORTNESS OF BREATH: 0
ABDOMINAL PAIN: 0
CONSTIPATION: 0
EYES NEGATIVE: 1
COUGH: 0
ALLERGIC/IMMUNOLOGIC NEGATIVE: 1
DIARRHEA: 0
BLOOD IN STOOL: 0

## 2021-04-12 NOTE — PROGRESS NOTES
[facility]  [facility]  [address]  [facility] [address]  Dept: [phone]    4/12/2021    CHIEF COMPLAINT    Chief Complaint   Patient presents with    Weight Loss    Cerumen Impaction     rt side       HPI    Palu Tiwari is a 66 y.o. female who presents   Chief Complaint   Patient presents with    Weight Loss    Cerumen Impaction     rt side   . Recheck weight, has lost over 30 pounds without intention. Has colonoscopy, EGD scheduled May 5th at [facility] with Dr. Troy Gottlieb. Says that lab work was done per Dr. Rosa Engel office but she has not been told that there were any abnormal results. They are not available in this system. Will be seeing cardiologist, Dr. Yahaira Mahajan ,for clearance first.  History of A. fib, currently on sotalol. Occasionally has feeling of palpitations. History of chronic fatigue and shortness of breath. No real change in those symptoms despite treatment for A. fib. She does take a baby aspirin daily. She is concerned about impacted earwax especially on the right. Has had to have it flushed in the past.  Has been having episodes of dizziness and she was not sure if the earwax could have contributed to that. Vitals:    04/12/21 1001   BP: 100/60   Pulse: 84   Temp: 98 °F (36.7 °C)   SpO2: 98%   Weight: 154 lb 6.4 oz (70 kg)       REVIEW OF SYSTEMS    Review of Systems   Constitutional: Positive for fatigue and unexpected weight change. Negative for fever. HENT: Negative. Eyes: Negative. Respiratory: Negative for cough and shortness of breath. Cardiovascular: Positive for palpitations and leg swelling (mild). Negative for chest pain. Gastrointestinal: Negative for abdominal pain, blood in stool, constipation and diarrhea. Positive cologuard   Endocrine: Negative. Genitourinary: Negative for frequency and urgency. Musculoskeletal: Positive for arthralgias (chronic OA). Skin: Negative. Gets together: None     Attends [employer] service: None     Active member of club or organization: None     Attends meetings of clubs or organizations: None     Relationship status: None    Intimate partner violence     Fear of current or ex partner: None     Emotionally abused: None     Physically abused: None     Forced sexual activity: None   Other Topics Concern    None   Social History Narrative    None       SURGICAL HISTORY    Past Surgical History:   Procedure Laterality Date    CARDIAC CATHETERIZATION  2014    CARDIAC CATHETERIZATION  2016    COLONOSCOPY      JOINT REPLACEMENT Right     total knee    JOINT REPLACEMENT Left     total knee    KNEE JOINT MANIPULATION Right     SPINE SURGERY  2017    lumbar fusion       CURRENT MEDICATIONS    Current Outpatient Medications   Medication Sig Dispense Refill    meloxicam (MOBIC) 15 MG tablet TAKE ONE TABLET BY MOUTH DAILY 90 tablet 1    furosemide (LASIX) 40 MG tablet Take 40 mg by mouth daily      Multiple Vitamins-Minerals (THERAPEUTIC MULTIVITAMIN-MINERALS) tablet Take 1 tablet by mouth daily      Probiotic Product (PROBIOTIC ADVANCED PO) Take 1 tablet by mouth three times a week Takes Monday, Wednesday and Friday      sotalol (BETAPACE) 80 MG tablet Take 80 mg by mouth 2 times daily      Handicap Placard MISC by Does not apply route  5 years from origninal written date 2022    Dx: Osteoarthritis unable to ambulate over 150ft 1 each 0    aspirin 81 MG tablet Take 81 mg by mouth daily. No current facility-administered medications for this visit. ALLERGIES    No Known Allergies    PHYSICAL EXAM   Physical Exam  Vitals signs reviewed. Constitutional:       Appearance: She is well-developed. HENT:      Head: Normocephalic. Right Ear: There is impacted cerumen. Left Ear: There is impacted cerumen.    Eyes:      Conjunctiva/sclera: Conjunctivae normal.      Pupils: Pupils are equal, round, and reactive to light. Neck:      Musculoskeletal: Normal range of motion and neck supple. Thyroid: No thyromegaly. Cardiovascular:      Rate and Rhythm: Normal rate. Rhythm irregularly irregular. Heart sounds: Normal heart sounds. No murmur. Pulmonary:      Effort: Pulmonary effort is normal.      Breath sounds: Normal breath sounds. No wheezing or rales. Abdominal:      Palpations: Abdomen is soft. There is no mass. Tenderness: There is no abdominal tenderness. There is no guarding or rebound. Musculoskeletal: Normal range of motion. General: No tenderness or deformity. Lymphadenopathy:      Cervical: No cervical adenopathy. Skin:     General: Skin is warm and dry. Neurological:      Mental Status: She is alert and oriented to person, place, and time. Psychiatric:         Mood and Affect: Mood normal.         Behavior: Behavior normal.         Thought Content: Thought content normal.         Judgment: Judgment normal.         ASSESSMENT/PLAN  1. Weight loss, non-intentional  Proceed with EGD and colonoscopy as planned. We will try to get lab results from [facility] to determine if anything further is needed as far as work-up. - POCT Urinalysis no Micro  Results for orders placed or performed in visit on 04/12/21   POCT Urinalysis no Micro   Result Value Ref Range    Color, UA yellow     Clarity, UA clear     Glucose, UA POC negative     Bilirubin, UA negative     Ketones, UA negative     Spec Grav, UA 1.020     Blood, UA POC negative     pH, UA 5.5     Protein, UA POC negative     Urobilinogen, UA 0.2     Leukocytes, UA negative     Nitrite, UA negative     Appearance, Fluid Clear Clear, Slightly Cloudy       2. Paroxysmal A-fib [facility])  Patient was told she is currently in A. fib with a controlled rate. Continue med and aspirin and follow-up with cardiologist.    3. Dizziness  Most likely related to medications. Continue to monitor symptoms    4.  Bilateral impacted cerumen  Irrigation of cerumen per nurse with good results. Tanner Haider received counseling on the following healthy behaviors: nutrition, exercise and medication adherence  Reviewed prior labs and health maintenance. Continue current medications, diet and exercise. Discussed use, benefit, and side effects of prescribed medications. Barriers to medication compliance addressed. Patient given educational materials - see patient instructions. All patient questions answered. Patient voiced understanding. Return in about 2 months (around 6/12/2021) for weight check.         Electronically signed by Chon Mak MD on 4/12/21 at 10:25 AM EDT

## 2021-05-18 ENCOUNTER — TELEPHONE (OUTPATIENT)
Dept: FAMILY MEDICINE CLINIC | Age: 71
End: 2021-05-18

## 2021-05-18 DIAGNOSIS — G47.33 OSA (OBSTRUCTIVE SLEEP APNEA): ICD-10-CM

## 2021-05-18 DIAGNOSIS — R53.82 CHRONIC FATIGUE: ICD-10-CM

## 2021-05-18 DIAGNOSIS — G47.33 OSA ON CPAP: Primary | ICD-10-CM

## 2021-05-18 DIAGNOSIS — Z99.89 OSA ON CPAP: Primary | ICD-10-CM

## 2021-05-18 NOTE — TELEPHONE ENCOUNTER
Per Dental office they need a current sleep test  In order to get approved for a dental device.  Does patient want to do snap sleep test? If so please fill out paper work for patient

## 2021-06-14 ENCOUNTER — OFFICE VISIT (OUTPATIENT)
Dept: FAMILY MEDICINE CLINIC | Age: 71
End: 2021-06-14
Payer: MEDICARE

## 2021-06-14 VITALS
TEMPERATURE: 97.6 F | BODY MASS INDEX: 22.66 KG/M2 | SYSTOLIC BLOOD PRESSURE: 122 MMHG | DIASTOLIC BLOOD PRESSURE: 60 MMHG | HEART RATE: 112 BPM | WEIGHT: 149 LBS | OXYGEN SATURATION: 98 %

## 2021-06-14 DIAGNOSIS — I48.21 PERMANENT ATRIAL FIBRILLATION (HCC): Primary | ICD-10-CM

## 2021-06-14 DIAGNOSIS — I10 ESSENTIAL HYPERTENSION: ICD-10-CM

## 2021-06-14 DIAGNOSIS — E78.2 MIXED HYPERLIPIDEMIA: ICD-10-CM

## 2021-06-14 DIAGNOSIS — N18.32 STAGE 3B CHRONIC KIDNEY DISEASE (HCC): ICD-10-CM

## 2021-06-14 DIAGNOSIS — R06.02 SOB (SHORTNESS OF BREATH): ICD-10-CM

## 2021-06-14 PROCEDURE — 4040F PNEUMOC VAC/ADMIN/RCVD: CPT | Performed by: FAMILY MEDICINE

## 2021-06-14 PROCEDURE — 99215 OFFICE O/P EST HI 40 MIN: CPT | Performed by: FAMILY MEDICINE

## 2021-06-14 PROCEDURE — 1036F TOBACCO NON-USER: CPT | Performed by: FAMILY MEDICINE

## 2021-06-14 PROCEDURE — G8427 DOCREV CUR MEDS BY ELIG CLIN: HCPCS | Performed by: FAMILY MEDICINE

## 2021-06-14 PROCEDURE — 1123F ACP DISCUSS/DSCN MKR DOCD: CPT | Performed by: FAMILY MEDICINE

## 2021-06-14 PROCEDURE — 3017F COLORECTAL CA SCREEN DOC REV: CPT | Performed by: FAMILY MEDICINE

## 2021-06-14 PROCEDURE — 1090F PRES/ABSN URINE INCON ASSESS: CPT | Performed by: FAMILY MEDICINE

## 2021-06-14 PROCEDURE — G8420 CALC BMI NORM PARAMETERS: HCPCS | Performed by: FAMILY MEDICINE

## 2021-06-14 PROCEDURE — G8399 PT W/DXA RESULTS DOCUMENT: HCPCS | Performed by: FAMILY MEDICINE

## 2021-06-14 RX ORDER — OMEPRAZOLE 20 MG/1
CAPSULE, DELAYED RELEASE ORAL
COMMUNITY
Start: 2021-06-01 | End: 2021-08-19 | Stop reason: ALTCHOICE

## 2021-06-14 RX ORDER — RIVAROXABAN 20 MG/1
20 TABLET, FILM COATED ORAL
COMMUNITY
Start: 2021-05-06

## 2021-06-14 ASSESSMENT — ENCOUNTER SYMPTOMS
ALLERGIC/IMMUNOLOGIC NEGATIVE: 1
DIARRHEA: 0
ABDOMINAL PAIN: 0
APNEA: 1
BLOOD IN STOOL: 0
COUGH: 0
EYES NEGATIVE: 1
SHORTNESS OF BREATH: 1
CONSTIPATION: 0

## 2021-06-14 NOTE — PROGRESS NOTES
1607   BP: 122/60   Pulse: 112   Temp: 97.6 °F (36.4 °C)   SpO2: 98%   Weight: 149 lb (67.6 kg)       REVIEW OF SYSTEMS    Review of Systems   Constitutional: Positive for unexpected weight change (  13 pounds in the past year). Negative for fatigue and fever. HENT: Negative. Eyes: Negative. Respiratory: Positive for apnea and shortness of breath. Negative for cough. See HPI   Cardiovascular: Negative for chest pain and leg swelling. Gastrointestinal: Negative for abdominal pain, blood in stool, constipation and diarrhea. See HPI   Endocrine: Negative. Genitourinary: Negative for frequency and urgency. Musculoskeletal: Negative. Skin: Negative. Allergic/Immunologic: Negative. Neurological: Negative for dizziness and headaches. Hematological: Negative. Psychiatric/Behavioral: Negative for sleep disturbance. The patient is not nervous/anxious.         PAST MEDICAL HISTORY    Past Medical History:   Diagnosis Date    Anxiety     Arthritis     RA    Asthmatic bronchitis, unspecified asthma severity, uncomplicated 56/72/7061    Atrial fibrillation (HCC)     not recurrent    Dyspnea on exertion 05/09/2018    GERD (gastroesophageal reflux disease)     Hyperlipidemia     diet controlled    Hypertension     Mitral valve disorder     Moderate to severe pulmonary hypertension ([address] 05/09/2018    Osteoarthritis     Palpitation     Physical deconditioning     Shortness of breath     with excertion work up is negative    Sleep apnea     not on cpap    Spinal stenosis at L4-L5 level     Stage 3b chronic kidney disease (HCC) 2020    Tachycardia     Tricuspid regurgitation     mild       FAMILY HISTORY    Family History   Problem Relation Age of Onset    High Blood Pressure Mother     Arthritis Mother     Diabetes Father     Arthritis Sister        SOCIAL HISTORY    Social History     Socioeconomic History    Marital status: Single     Spouse name: None    Number of children: None    Years of education: None    Highest education level: None   Occupational History    None   Tobacco Use    Smoking status: Never Smoker    Smokeless tobacco: Never Used   Substance and Sexual Activity    Alcohol use: Yes     Alcohol/week: 5.0 standard drinks     Types: 5 Cans of beer per week    Drug use: No    Sexual activity: Never   Other Topics Concern    None   Social History Narrative    None     Social Determinants of Health     Financial Resource Strain: Low Risk     Difficulty of Paying Living Expenses: Not hard at all   Food Insecurity: No Food Insecurity    Worried About Running Out of Food in the Last Year: Never true    Mai of Food in the Last Year: Never true   Transportation Needs: No Transportation Needs    Lack of Transportation (Medical): No    Lack of Transportation (Non-Medical):  No   Physical Activity:     Days of Exercise per Week:     Minutes of Exercise per Session:    Stress:     Feeling of Stress :    Social Connections:     Frequency of Communication with Friends and Family:     Frequency of Social Gatherings with Friends and Family:     Attends Alevism Services:     Active Member of Clubs or Organizations:     Attends Club or Organization Meetings:     Marital Status:    Intimate Partner Violence:     Fear of Current or Ex-Partner:     Emotionally Abused:     Physically Abused:     Sexually Abused:        SURGICAL HISTORY    Past Surgical History:   Procedure Laterality Date    CARDIAC CATHETERIZATION  05/19/2014    CARDIAC CATHETERIZATION  01/20/2016    COLONOSCOPY      JOINT REPLACEMENT Right     total knee    JOINT REPLACEMENT Left     total knee    KNEE JOINT MANIPULATION Right     SPINE SURGERY  07/2017    lumbar fusion       CURRENT MEDICATIONS    Current Outpatient Medications   Medication Sig Dispense Refill    XARELTO 20 MG TABS tablet       furosemide (LASIX) 40 MG tablet Take 40 mg by mouth daily      Multiple Vitamins-Minerals (THERAPEUTIC MULTIVITAMIN-MINERALS) tablet Take 1 tablet by mouth daily      sotalol (BETAPACE) 80 MG tablet Take 80 mg by mouth 2 times daily      Handicap Placard MISC by Does not apply route  5 years from origninal written date 2022    Dx: Osteoarthritis unable to ambulate over 150ft 1 each 0    aspirin 81 MG tablet Take 81 mg by mouth daily.  omeprazole (PRILOSEC) 20 MG delayed release capsule        No current facility-administered medications for this visit. ALLERGIES    No Known Allergies    PHYSICAL EXAM   Physical Exam  Vitals reviewed. Constitutional:       Appearance: She is well-developed. HENT:      Head: Normocephalic. Eyes:      Conjunctiva/sclera: Conjunctivae normal.      Pupils: Pupils are equal, round, and reactive to light. Neck:      Thyroid: No thyromegaly. Cardiovascular:      Rate and Rhythm: Normal rate. Rhythm irregularly irregular. Heart sounds: Normal heart sounds. No murmur heard. Pulmonary:      Effort: Pulmonary effort is normal.      Breath sounds: Normal breath sounds. No wheezing or rales. Abdominal:      Palpations: Abdomen is soft. Tenderness: There is no abdominal tenderness. There is no guarding or rebound. Musculoskeletal:         General: Deformity (  Osteoarthritic changes, mild kyphosis of thoracic spine) present. No tenderness. Normal range of motion. Cervical back: Normal range of motion and neck supple. Lymphadenopathy:      Cervical: No cervical adenopathy. Skin:     General: Skin is warm and dry. Neurological:      Mental Status: She is alert and oriented to person, place, and time. Psychiatric:         Mood and Affect: Mood normal.         Behavior: Behavior normal.         Thought Content: Thought content normal.         Judgment: Judgment normal.         ASSESSMENT/PLAN  1.  Permanent atrial fibrillation (HCC)  Discussed that she is in A. fib this could possibly be the cause of her chronic shortness of breath. Encouraged her to call her cardiologist and get another appointment to discuss possible ablation. She also had read about the watchman device that could be used in A. fib but I explained that it does not stop A. fib which is prevents blood clots from forming in the heart. - Magnesium; Future    2. Stage 3b chronic kidney disease ([address]  I reviewed her past creatinine and it has gradually increased over the last 6 years. We'll try to get last results from [facility] that prevented her from getting IV contrast and will also recheck and refer to nephrologist at [facility].  - External Referral To Nephrology  - Vitamin D 25 Hydroxy; Future    3. SOB (shortness of breath)  She requested a test for Aspergillus as she has been found to have extreme amount of mold in her home. - Aspergillus Antibodies; Future    4. Essential hypertension  Chronic and stable. Continue current meds  - Comprehensive Metabolic Panel; Future    5. Mixed hyperlipidemia  Recheck  - Lipid Panel; Future       Theodora received counseling on the following healthy behaviors: nutrition, exercise and medication adherence  Reviewed prior labs and health maintenance. Continue current medications, diet and exercise. Discussed use, benefit, and side effects of prescribed medications. Barriers to medication compliance addressed. Patient given educational materials - see patient instructions. All patient questions answered. Patient voiced understanding. Return in about 2 months (around 8/14/2021) for Shortness of breath.         Electronically signed by Ailyn Godfrey MD on 6/14/21 at 4:16 PM EDT

## 2021-07-08 ENCOUNTER — TELEPHONE (OUTPATIENT)
Dept: FAMILY MEDICINE CLINIC | Age: 71
End: 2021-07-08

## 2021-07-08 NOTE — TELEPHONE ENCOUNTER
Please call patient and inform her that her home sleep study shows mild sleep apnea. She could try CPAP if she would like. I will send in orders if she agrees.

## 2021-07-09 DIAGNOSIS — G47.33 OSA (OBSTRUCTIVE SLEEP APNEA): ICD-10-CM

## 2021-07-09 DIAGNOSIS — R53.82 CHRONIC FATIGUE: ICD-10-CM

## 2021-07-13 NOTE — TELEPHONE ENCOUNTER
Pt stated is aware that she has sleep apnea, and currently has a cpap machine. Unable to use current nasal cpap. Will go to [facility] to have the cpap via mouth. DOES NOT HAVE AN APPT AT THIS TIME!!!    Needs referral :     Iza Stephenson, S  [address] [place], [place], [address]  . .. Dr. Chuck Michaels. [place] [phone]  fax: [phone]    Phone: [phone]  fax: [phone]      PT WAS UPSET [address] [place] [address] [employer] PHONE SYSTEM, NOT ABLE TO REACH US AS NEEDED. WANTS TO CALL US DIRECTLY.

## 2021-07-13 NOTE — TELEPHONE ENCOUNTER
Patient going to see the dentist for again oral appliance for sleep apnea?   Okay to do referral and call patient directly to let her know

## 2021-07-23 LAB
ALBUMIN SERPL-MCNC: NORMAL G/DL
ALP BLD-CCNC: NORMAL U/L
ALT SERPL-CCNC: NORMAL U/L
ANION GAP SERPL CALCULATED.3IONS-SCNC: NORMAL MMOL/L
AST SERPL-CCNC: NORMAL U/L
BILIRUB SERPL-MCNC: NORMAL MG/DL
BUN BLDV-MCNC: NORMAL MG/DL
CALCIUM SERPL-MCNC: NORMAL MG/DL
CHLORIDE BLD-SCNC: NORMAL MMOL/L
CHOLESTEROL, TOTAL: NORMAL
CHOLESTEROL/HDL RATIO: NORMAL
CO2: NORMAL
CREAT SERPL-MCNC: NORMAL MG/DL
GFR CALCULATED: NORMAL
GLUCOSE BLD-MCNC: NORMAL MG/DL
HDLC SERPL-MCNC: NORMAL MG/DL
LDL CHOLESTEROL CALCULATED: NORMAL
MAGNESIUM: NORMAL
NONHDLC SERPL-MCNC: NORMAL MG/DL
POTASSIUM SERPL-SCNC: NORMAL MMOL/L
SODIUM BLD-SCNC: NORMAL MMOL/L
TOTAL PROTEIN: NORMAL
TRIGL SERPL-MCNC: NORMAL MG/DL
VITAMIN D 25-HYDROXY: NORMAL
VITAMIN D2, 25 HYDROXY: NORMAL
VITAMIN D3,25 HYDROXY: NORMAL
VLDLC SERPL CALC-MCNC: NORMAL MG/DL

## 2021-07-27 DIAGNOSIS — I48.21 PERMANENT ATRIAL FIBRILLATION (HCC): ICD-10-CM

## 2021-07-27 DIAGNOSIS — N18.32 STAGE 3B CHRONIC KIDNEY DISEASE (HCC): ICD-10-CM

## 2021-07-27 DIAGNOSIS — I10 ESSENTIAL HYPERTENSION: ICD-10-CM

## 2021-07-27 DIAGNOSIS — R06.02 SOB (SHORTNESS OF BREATH): ICD-10-CM

## 2021-07-27 DIAGNOSIS — E78.2 MIXED HYPERLIPIDEMIA: ICD-10-CM

## 2021-08-19 ENCOUNTER — OFFICE VISIT (OUTPATIENT)
Dept: FAMILY MEDICINE CLINIC | Age: 71
End: 2021-08-19
Payer: MEDICARE

## 2021-08-19 VITALS
DIASTOLIC BLOOD PRESSURE: 64 MMHG | WEIGHT: 148.2 LBS | HEIGHT: 68 IN | RESPIRATION RATE: 16 BRPM | HEART RATE: 94 BPM | OXYGEN SATURATION: 99 % | SYSTOLIC BLOOD PRESSURE: 122 MMHG | BODY MASS INDEX: 22.46 KG/M2 | TEMPERATURE: 97.3 F

## 2021-08-19 DIAGNOSIS — R73.03 PREDIABETES: ICD-10-CM

## 2021-08-19 DIAGNOSIS — M15.9 PRIMARY OSTEOARTHRITIS INVOLVING MULTIPLE JOINTS: ICD-10-CM

## 2021-08-19 DIAGNOSIS — I82.531 CHRONIC EMBOLISM AND THROMBOSIS OF RIGHT POPLITEAL VEIN (HCC): ICD-10-CM

## 2021-08-19 DIAGNOSIS — N18.31 STAGE 3A CHRONIC KIDNEY DISEASE (HCC): ICD-10-CM

## 2021-08-19 DIAGNOSIS — R73.09 ELEVATED GLUCOSE: ICD-10-CM

## 2021-08-19 DIAGNOSIS — I48.21 PERMANENT ATRIAL FIBRILLATION (HCC): Primary | ICD-10-CM

## 2021-08-19 DIAGNOSIS — R06.02 SOB (SHORTNESS OF BREATH): ICD-10-CM

## 2021-08-19 DIAGNOSIS — I27.20 MODERATE TO SEVERE PULMONARY HYPERTENSION (HCC): ICD-10-CM

## 2021-08-19 DIAGNOSIS — G47.33 OSA (OBSTRUCTIVE SLEEP APNEA): ICD-10-CM

## 2021-08-19 LAB — HBA1C MFR BLD: 6.2 %

## 2021-08-19 PROCEDURE — 1090F PRES/ABSN URINE INCON ASSESS: CPT | Performed by: FAMILY MEDICINE

## 2021-08-19 PROCEDURE — 1036F TOBACCO NON-USER: CPT | Performed by: FAMILY MEDICINE

## 2021-08-19 PROCEDURE — G8420 CALC BMI NORM PARAMETERS: HCPCS | Performed by: FAMILY MEDICINE

## 2021-08-19 PROCEDURE — 99214 OFFICE O/P EST MOD 30 MIN: CPT | Performed by: FAMILY MEDICINE

## 2021-08-19 PROCEDURE — 1123F ACP DISCUSS/DSCN MKR DOCD: CPT | Performed by: FAMILY MEDICINE

## 2021-08-19 PROCEDURE — G8427 DOCREV CUR MEDS BY ELIG CLIN: HCPCS | Performed by: FAMILY MEDICINE

## 2021-08-19 PROCEDURE — 4040F PNEUMOC VAC/ADMIN/RCVD: CPT | Performed by: FAMILY MEDICINE

## 2021-08-19 PROCEDURE — 83036 HEMOGLOBIN GLYCOSYLATED A1C: CPT | Performed by: FAMILY MEDICINE

## 2021-08-19 PROCEDURE — 3017F COLORECTAL CA SCREEN DOC REV: CPT | Performed by: FAMILY MEDICINE

## 2021-08-19 PROCEDURE — G8399 PT W/DXA RESULTS DOCUMENT: HCPCS | Performed by: FAMILY MEDICINE

## 2021-08-19 SDOH — ECONOMIC STABILITY: TRANSPORTATION INSECURITY
IN THE PAST 12 MONTHS, HAS LACK OF TRANSPORTATION KEPT YOU FROM MEETINGS, WORK, OR FROM GETTING THINGS NEEDED FOR DAILY LIVING?: NO

## 2021-08-19 SDOH — ECONOMIC STABILITY: FOOD INSECURITY: WITHIN THE PAST 12 MONTHS, YOU WORRIED THAT YOUR FOOD WOULD RUN OUT BEFORE YOU GOT MONEY TO BUY MORE.: NEVER TRUE

## 2021-08-19 SDOH — ECONOMIC STABILITY: FOOD INSECURITY: WITHIN THE PAST 12 MONTHS, THE FOOD YOU BOUGHT JUST DIDN'T LAST AND YOU DIDN'T HAVE MONEY TO GET MORE.: NEVER TRUE

## 2021-08-19 ASSESSMENT — ENCOUNTER SYMPTOMS
COUGH: 0
ALLERGIC/IMMUNOLOGIC NEGATIVE: 1
DIARRHEA: 0
ABDOMINAL PAIN: 0
APNEA: 1
BLOOD IN STOOL: 0
EYES NEGATIVE: 1
CONSTIPATION: 0
SHORTNESS OF BREATH: 1

## 2021-08-19 ASSESSMENT — PATIENT HEALTH QUESTIONNAIRE - PHQ9
SUM OF ALL RESPONSES TO PHQ QUESTIONS 1-9: 0
SUM OF ALL RESPONSES TO PHQ9 QUESTIONS 1 & 2: 0
1. LITTLE INTEREST OR PLEASURE IN DOING THINGS: 0
SUM OF ALL RESPONSES TO PHQ QUESTIONS 1-9: 0
SUM OF ALL RESPONSES TO PHQ QUESTIONS 1-9: 0
2. FEELING DOWN, DEPRESSED OR HOPELESS: 0

## 2021-08-19 ASSESSMENT — SOCIAL DETERMINANTS OF HEALTH (SDOH): HOW HARD IS IT FOR YOU TO PAY FOR THE VERY BASICS LIKE FOOD, HOUSING, MEDICAL CARE, AND HEATING?: NOT HARD AT ALL

## 2021-08-19 NOTE — PROGRESS NOTES
[facility]  [facility]  [address]  [facility] [address]  Dept: [phone]    8/19/2021    CHIEF COMPLAINT    Chief Complaint   Patient presents with    Weight Loss     has lost 25 lbs gradually    Results     discuss lab results       HPI    Jodie Rios is a 66 y.o. female who presents   Chief Complaint   Patient presents with    Weight Loss     has lost 25 lbs gradually    Results     discuss lab results   . Recheck chronic conditions including A. fib and hypertension. She is taking medication as prescribed. She is still feeling off balance and dizzy and believes it is related to the sotalol. She is going to be having a cardiac ablation in the near future. She has not had any falls but has to stop and rest frequently due to shortness of breath. History of chronic kidney disease stage III, referral was given to her for nephrologist at last appointment but she has not made this appointment yet. Last labs showed a blood sugar of 126 which she stated was fasting. Hypertension  This is a chronic problem. The current episode started more than 1 year ago. The problem is controlled. Associated symptoms include palpitations and shortness of breath. Pertinent negatives include no chest pain or headaches. Past treatments include diuretics. The current treatment provides moderate improvement. Compliance problems include exercise. Vitals:    08/19/21 1001   BP: 122/64   Site: Left Upper Arm   Position: Sitting   Cuff Size: Large Adult   Pulse: 94   Resp: 16   Temp: 97.3 °F (36.3 °C)   TempSrc: Temporal   SpO2: 99%   Weight: 148 lb 3.2 oz (67.2 kg)   Height: 5' 8\" (1.727 m)       REVIEW OF SYSTEMS    Review of Systems   Constitutional: Positive for fatigue. Negative for fever and unexpected weight change. HENT: Negative. Eyes: Negative. Respiratory: Positive for apnea and shortness of breath. Negative for cough.          Obstructive Alcohol/week: 5.0 standard drinks     Types: 5 Cans of beer per week    Drug use: No    Sexual activity: Never   Other Topics Concern    None   Social History Narrative    None     Social Determinants of Health     Financial Resource Strain: Low Risk     Difficulty of Paying Living Expenses: Not hard at all   Food Insecurity: No Food Insecurity    Worried About Running Out of Food in the Last Year: Never true    Mai of Food in the Last Year: Never true   Transportation Needs: No Transportation Needs    Lack of Transportation (Medical): No    Lack of Transportation (Non-Medical):  No   Physical Activity:     Days of Exercise per Week:     Minutes of Exercise per Session:    Stress:     Feeling of Stress :    Social Connections:     Frequency of Communication with Friends and Family:     Frequency of Social Gatherings with Friends and Family:     Attends Tenriism Services:     Active Member of Clubs or Organizations:     Attends Club or Organization Meetings:     Marital Status:    Intimate Partner Violence:     Fear of Current or Ex-Partner:     Emotionally Abused:     Physically Abused:     Sexually Abused:        SURGICAL HISTORY    Past Surgical History:   Procedure Laterality Date    CARDIAC CATHETERIZATION  2014    CARDIAC CATHETERIZATION  2016    COLONOSCOPY      JOINT REPLACEMENT Right     total knee    JOINT REPLACEMENT Left     total knee    KNEE JOINT MANIPULATION Right     SPINE SURGERY  2017    lumbar fusion       CURRENT MEDICATIONS    Current Outpatient Medications   Medication Sig Dispense Refill    XARELTO 20 MG TABS tablet       furosemide (LASIX) 40 MG tablet Take 40 mg by mouth daily      Multiple Vitamins-Minerals (THERAPEUTIC MULTIVITAMIN-MINERALS) tablet Take 1 tablet by mouth daily      sotalol (BETAPACE) 80 MG tablet Take 80 mg by mouth 2 times daily      Handicap Placard MISC by Does not apply route  5 years from origninal written that she could make  Results for orders placed or performed in visit on 08/19/21   POCT glycosylated hemoglobin (Hb A1C)   Result Value Ref Range    Hemoglobin A1C 6.2 %         4. 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 Elevated glucose    - POCT glycosylated hemoglobin (Hb A1C)    6. Primary osteoarthritis involving multiple joints  Continue activity within any limitations    7. SOB (shortness of breath)  Discussed that shortness of breath could be due to her chronic A. fib and may improve after the ablation. 8. JOSE (obstructive sleep apnea)  Continue with dental device as planned   9. CKD  Make appointment with nephrologist as per prior referral    Ganesh Moreland received counseling on the following healthy behaviors: nutrition, exercise and medication adherence  Reviewed prior labs and health maintenance  Continue current medications, diet and exercise. Discussed use, benefit, and side effects of prescribed medications. Barriers to medication compliance addressed. Patient given educational materials - see patient instructions  Was a self-tracking handout given in paper form or via [employer]? Yes    Requested Prescriptions      No prescriptions requested or ordered in this encounter       All patient questions answered. Patient voiced understanding. Quality Measures    Body mass index is 22.53 kg/m². Normal. Weight control planned discussed Healthy diet and regular exercise. BP: 122/64. Blood pressure is normal. Treatment plan consists of No treatment change needed. Fall Risk 4/12/2021 2/15/2018   2 or more falls in past year? yes yes   Fall with injury in past year? no no     The patient does not have a history of falls. I did , complete a risk assessment for falls.  A plan of care for falls Continue using support as needed and moving slowly, No Treatment plan indicated    No results found for: LDLCALC, LDLCHOLESTEROL, LDLDIRECT (goal LDL reduction with

## 2021-10-18 ENCOUNTER — TELEPHONE (OUTPATIENT)
Dept: FAMILY MEDICINE CLINIC | Age: 71
End: 2021-10-18

## 2021-10-19 RX ORDER — CEPHALEXIN 500 MG/1
CAPSULE ORAL
Qty: 4 CAPSULE | Refills: 0 | Status: SHIPPED | OUTPATIENT
Start: 2021-10-19 | End: 2021-10-27

## 2021-10-19 NOTE — TELEPHONE ENCOUNTER
Pharm requested    Health Maintenance   Topic Date Due    Shingles Vaccine (1 of 2) Never done    Pneumococcal 65+ years Vaccine (1 of 1 - PPSV23) Never done   [employer] Visit (AWV)  Never done    Flu vaccine (1) Never done    Potassium monitoring  07/23/2022    Creatinine monitoring  07/23/2022    A1C test (Diabetic or Prediabetic)  08/19/2022    DTaP/Tdap/Td vaccine (3 - Td or Tdap) 01/01/2023    Breast cancer screen  03/09/2023    Lipid screen  07/23/2026    Colon cancer screen colonoscopy  05/05/2031    DEXA (modify frequency per FRAX score)  Completed    COVID-19 Vaccine  Completed    Hepatitis A vaccine  Aged Out    Hepatitis B vaccine  Aged Out    Hib vaccine  Aged Out    Meningococcal (ACWY) vaccine  Aged Out    Hepatitis C screen  Discontinued             (applicable per patient's age: Cancer Screenings, Depression Screening, Fall Risk Screening, Immunizations)    Hemoglobin A1C (%)   Date Value   08/19/2021 6.2     BUN (mg/dL)   Date Value   02/12/2015 19      (goal A1C is < 7)   (goal LDL is <100) need 30-50% reduction from baseline     BP Readings from Last 3 Encounters:   08/19/21 122/64   06/14/21 122/60   04/12/21 100/60    (goal /80)      All Future Testing planned in [facility]:  Lab Frequency Next Occurrence       Next Visit Date:  No future appointments.          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)      Embolism and thrombosis of both popliteal veins (HCC)      Status post cardiac catheterization     Asthmatic bronchitis, unspecified asthma severity, uncomplicated     Balance problems     Chronic diastolic (congestive) heart failure (HCC)     Chronic kidney disease, stage III (moderate) (HCC)     Essential hypertension     Gait disturbance     Idiopathic peripheral neuropathy     Laryngopharyngeal reflux (LPR)     Leg length discrepancy     Limp     Peripheral polyneuropathy     Primary osteoarthritis of right hip     Pulmonary scarring     Restrictive lung disease     Enthesopathy     Kyphosis of thoracic region     Spondylolisthesis at L5-S1 level     Spondylosis of lumbar region without myelopathy or radiculopathy     Supraventricular tachycardia (HCC)     Trochanteric bursitis of right hip     Valvular heart disease     Osteoarthritis of knee

## 2021-11-01 ENCOUNTER — HOSPITAL ENCOUNTER (OUTPATIENT)
Dept: CARDIAC CATH/INVASIVE PROCEDURES | Age: 71
Discharge: HOME | End: 2021-11-01
Attending: INTERNAL MEDICINE | Admitting: INTERNAL MEDICINE
Payer: MEDICARE

## 2021-11-01 ENCOUNTER — ANESTHESIA EVENT (OUTPATIENT)
Dept: CARDIAC CATH/INVASIVE PROCEDURES | Age: 71
End: 2021-11-01
Payer: MEDICARE

## 2021-11-01 ENCOUNTER — ANESTHESIA (OUTPATIENT)
Dept: CARDIAC CATH/INVASIVE PROCEDURES | Age: 71
End: 2021-11-01
Payer: MEDICARE

## 2021-11-01 VITALS
HEART RATE: 58 BPM | RESPIRATION RATE: 11 BRPM | WEIGHT: 145.1 LBS | BODY MASS INDEX: 23.32 KG/M2 | HEIGHT: 66 IN | TEMPERATURE: 97.2 F | SYSTOLIC BLOOD PRESSURE: 102 MMHG | DIASTOLIC BLOOD PRESSURE: 83 MMHG | OXYGEN SATURATION: 95 %

## 2021-11-01 VITALS — SYSTOLIC BLOOD PRESSURE: 98 MMHG | OXYGEN SATURATION: 91 % | DIASTOLIC BLOOD PRESSURE: 66 MMHG | TEMPERATURE: 91.8 F

## 2021-11-01 LAB
ABO/RH: NORMAL
ANION GAP SERPL CALCULATED.3IONS-SCNC: 14 MMOL/L (ref 9–17)
ANTIBODY SCREEN: NEGATIVE
ARM BAND NUMBER: NORMAL
BUN BLDV-MCNC: 26 MG/DL (ref 8–23)
BUN/CREAT BLD: 23 (ref 9–20)
CALCIUM SERPL-MCNC: 9.7 MG/DL (ref 8.6–10.4)
CHLORIDE BLD-SCNC: 105 MMOL/L (ref 98–107)
CO2: 25 MMOL/L (ref 20–31)
CREAT SERPL-MCNC: 1.15 MG/DL (ref 0.5–0.9)
EXPIRATION DATE: NORMAL
GFR AFRICAN AMERICAN: 56 ML/MIN
GFR NON-AFRICAN AMERICAN: 46 ML/MIN
GFR SERPL CREATININE-BSD FRML MDRD: ABNORMAL ML/MIN/{1.73_M2}
GFR SERPL CREATININE-BSD FRML MDRD: ABNORMAL ML/MIN/{1.73_M2}
GLUCOSE BLD-MCNC: 110 MG/DL (ref 70–99)
HCT VFR BLD CALC: 41.3 % (ref 36.3–47.1)
HEMOGLOBIN: 13 G/DL (ref 11.9–15.1)
MCH RBC QN AUTO: 30.2 PG (ref 25.2–33.5)
MCHC RBC AUTO-ENTMCNC: 31.5 G/DL (ref 28.4–34.8)
MCV RBC AUTO: 96 FL (ref 82.6–102.9)
NRBC AUTOMATED: 0 PER 100 WBC
PDW BLD-RTO: 15.5 % (ref 11.8–14.4)
PLATELET # BLD: 150 K/UL (ref 138–453)
PMV BLD AUTO: 10.8 FL (ref 8.1–13.5)
POTASSIUM SERPL-SCNC: 4 MMOL/L (ref 3.7–5.3)
RBC # BLD: 4.3 M/UL (ref 3.95–5.11)
SODIUM BLD-SCNC: 144 MMOL/L (ref 135–144)
WBC # BLD: 6 K/UL (ref 3.5–11.3)

## 2021-11-01 PROCEDURE — 7100000011 HC PHASE II RECOVERY - ADDTL 15 MIN

## 2021-11-01 PROCEDURE — 3700000001 HC ADD 15 MINUTES (ANESTHESIA)

## 2021-11-01 PROCEDURE — 2720000010 HC SURG SUPPLY STERILE

## 2021-11-01 PROCEDURE — 6360000002 HC RX W HCPCS: Performed by: ANESTHESIOLOGY

## 2021-11-01 PROCEDURE — 93306 TTE W/DOPPLER COMPLETE: CPT

## 2021-11-01 PROCEDURE — C1769 GUIDE WIRE: HCPCS

## 2021-11-01 PROCEDURE — 93005 ELECTROCARDIOGRAM TRACING: CPT | Performed by: ANESTHESIOLOGY

## 2021-11-01 PROCEDURE — 2500000003 HC RX 250 WO HCPCS

## 2021-11-01 PROCEDURE — 86850 RBC ANTIBODY SCREEN: CPT

## 2021-11-01 PROCEDURE — 6370000000 HC RX 637 (ALT 250 FOR IP): Performed by: INTERNAL MEDICINE

## 2021-11-01 PROCEDURE — 2709999900 HC NON-CHARGEABLE SUPPLY

## 2021-11-01 PROCEDURE — 93622 COMP EP EVAL L VENTR PAC&REC: CPT | Performed by: INTERNAL MEDICINE

## 2021-11-01 PROCEDURE — 93312 ECHO TRANSESOPHAGEAL: CPT

## 2021-11-01 PROCEDURE — 86901 BLOOD TYPING SEROLOGIC RH(D): CPT

## 2021-11-01 PROCEDURE — 7100000010 HC PHASE II RECOVERY - FIRST 15 MIN

## 2021-11-01 PROCEDURE — 2500000003 HC RX 250 WO HCPCS: Performed by: NURSE ANESTHETIST, CERTIFIED REGISTERED

## 2021-11-01 PROCEDURE — 93005 ELECTROCARDIOGRAM TRACING: CPT | Performed by: INTERNAL MEDICINE

## 2021-11-01 PROCEDURE — 36415 COLL VENOUS BLD VENIPUNCTURE: CPT

## 2021-11-01 PROCEDURE — 93613 INTRACARDIAC EPHYS 3D MAPG: CPT | Performed by: INTERNAL MEDICINE

## 2021-11-01 PROCEDURE — 2580000003 HC RX 258

## 2021-11-01 PROCEDURE — 6360000004 HC RX CONTRAST MEDICATION

## 2021-11-01 PROCEDURE — 93656 COMPRE EP EVAL ABLTJ ATR FIB: CPT | Performed by: INTERNAL MEDICINE

## 2021-11-01 PROCEDURE — 93662 INTRACARDIAC ECG (ICE): CPT | Performed by: INTERNAL MEDICINE

## 2021-11-01 PROCEDURE — 2580000003 HC RX 258: Performed by: NURSE ANESTHETIST, CERTIFIED REGISTERED

## 2021-11-01 PROCEDURE — 7100000000 HC PACU RECOVERY - FIRST 15 MIN

## 2021-11-01 PROCEDURE — 7100000001 HC PACU RECOVERY - ADDTL 15 MIN

## 2021-11-01 PROCEDURE — 85027 COMPLETE CBC AUTOMATED: CPT

## 2021-11-01 PROCEDURE — C1894 INTRO/SHEATH, NON-LASER: HCPCS

## 2021-11-01 PROCEDURE — C1713 ANCHOR/SCREW BN/BN,TIS/BN: HCPCS

## 2021-11-01 PROCEDURE — C1730 CATH, EP, 19 OR FEW ELECT: HCPCS

## 2021-11-01 PROCEDURE — C1893 INTRO/SHEATH, FIXED,NON-PEEL: HCPCS

## 2021-11-01 PROCEDURE — 86900 BLOOD TYPING SEROLOGIC ABO: CPT

## 2021-11-01 PROCEDURE — C1759 CATH, INTRA ECHOCARDIOGRAPHY: HCPCS

## 2021-11-01 PROCEDURE — 6360000002 HC RX W HCPCS

## 2021-11-01 PROCEDURE — 80048 BASIC METABOLIC PNL TOTAL CA: CPT

## 2021-11-01 PROCEDURE — C1766 INTRO/SHEATH,STRBLE,NON-PEEL: HCPCS

## 2021-11-01 PROCEDURE — 3700000000 HC ANESTHESIA ATTENDED CARE

## 2021-11-01 PROCEDURE — 6360000002 HC RX W HCPCS: Performed by: NURSE ANESTHETIST, CERTIFIED REGISTERED

## 2021-11-01 PROCEDURE — C1733 CATH, EP, OTHR THAN COOL-TIP: HCPCS

## 2021-11-01 RX ORDER — PHENYLEPHRINE HCL IN 0.9% NACL 1 MG/10 ML
SYRINGE (ML) INTRAVENOUS PRN
Status: DISCONTINUED | OUTPATIENT
Start: 2021-11-01 | End: 2021-11-01 | Stop reason: SDUPTHER

## 2021-11-01 RX ORDER — FENTANYL CITRATE 50 UG/ML
25 INJECTION, SOLUTION INTRAMUSCULAR; INTRAVENOUS EVERY 5 MIN PRN
Status: DISCONTINUED | OUTPATIENT
Start: 2021-11-01 | End: 2021-11-04 | Stop reason: HOSPADM

## 2021-11-01 RX ORDER — FENTANYL CITRATE 50 UG/ML
INJECTION, SOLUTION INTRAMUSCULAR; INTRAVENOUS PRN
Status: DISCONTINUED | OUTPATIENT
Start: 2021-11-01 | End: 2021-11-01 | Stop reason: SDUPTHER

## 2021-11-01 RX ORDER — MIDAZOLAM HYDROCHLORIDE 1 MG/ML
INJECTION INTRAMUSCULAR; INTRAVENOUS PRN
Status: DISCONTINUED | OUTPATIENT
Start: 2021-11-01 | End: 2021-11-01 | Stop reason: SDUPTHER

## 2021-11-01 RX ORDER — PROPOFOL 10 MG/ML
INJECTION, EMULSION INTRAVENOUS PRN
Status: DISCONTINUED | OUTPATIENT
Start: 2021-11-01 | End: 2021-11-01 | Stop reason: SDUPTHER

## 2021-11-01 RX ORDER — ONDANSETRON 2 MG/ML
4 INJECTION INTRAMUSCULAR; INTRAVENOUS
Status: ACTIVE | OUTPATIENT
Start: 2021-11-01 | End: 2021-11-01

## 2021-11-01 RX ORDER — SODIUM CHLORIDE 9 MG/ML
INJECTION, SOLUTION INTRAVENOUS CONTINUOUS PRN
Status: DISCONTINUED | OUTPATIENT
Start: 2021-11-01 | End: 2021-11-01 | Stop reason: SDUPTHER

## 2021-11-01 RX ORDER — PROTAMINE SULFATE 10 MG/ML
INJECTION, SOLUTION INTRAVENOUS PRN
Status: DISCONTINUED | OUTPATIENT
Start: 2021-11-01 | End: 2021-11-01 | Stop reason: SDUPTHER

## 2021-11-01 RX ORDER — SODIUM CHLORIDE 9 MG/ML
25 INJECTION, SOLUTION INTRAVENOUS PRN
Status: DISCONTINUED | OUTPATIENT
Start: 2021-11-01 | End: 2021-11-04 | Stop reason: HOSPADM

## 2021-11-01 RX ORDER — HEPARIN SODIUM 1000 [USP'U]/ML
INJECTION, SOLUTION INTRAVENOUS; SUBCUTANEOUS PRN
Status: DISCONTINUED | OUTPATIENT
Start: 2021-11-01 | End: 2021-11-01 | Stop reason: SDUPTHER

## 2021-11-01 RX ORDER — ACETAMINOPHEN 325 MG/1
650 TABLET ORAL EVERY 4 HOURS PRN
Status: DISCONTINUED | OUTPATIENT
Start: 2021-11-01 | End: 2021-11-04 | Stop reason: HOSPADM

## 2021-11-01 RX ORDER — LIDOCAINE HYDROCHLORIDE 20 MG/ML
INJECTION, SOLUTION EPIDURAL; INFILTRATION; INTRACAUDAL; PERINEURAL PRN
Status: DISCONTINUED | OUTPATIENT
Start: 2021-11-01 | End: 2021-11-01 | Stop reason: SDUPTHER

## 2021-11-01 RX ORDER — ONDANSETRON 2 MG/ML
INJECTION INTRAMUSCULAR; INTRAVENOUS PRN
Status: DISCONTINUED | OUTPATIENT
Start: 2021-11-01 | End: 2021-11-01 | Stop reason: SDUPTHER

## 2021-11-01 RX ORDER — SODIUM CHLORIDE 0.9 % (FLUSH) 0.9 %
5-40 SYRINGE (ML) INJECTION EVERY 12 HOURS SCHEDULED
Status: DISCONTINUED | OUTPATIENT
Start: 2021-11-01 | End: 2021-11-04 | Stop reason: HOSPADM

## 2021-11-01 RX ORDER — HYDROMORPHONE HYDROCHLORIDE 1 MG/ML
0.25 INJECTION, SOLUTION INTRAMUSCULAR; INTRAVENOUS; SUBCUTANEOUS EVERY 5 MIN PRN
Status: DISCONTINUED | OUTPATIENT
Start: 2021-11-01 | End: 2021-11-04 | Stop reason: HOSPADM

## 2021-11-01 RX ORDER — SODIUM CHLORIDE 0.9 % (FLUSH) 0.9 %
5-40 SYRINGE (ML) INJECTION PRN
Status: DISCONTINUED | OUTPATIENT
Start: 2021-11-01 | End: 2021-11-04 | Stop reason: HOSPADM

## 2021-11-01 RX ORDER — ROCURONIUM BROMIDE 10 MG/ML
INJECTION, SOLUTION INTRAVENOUS PRN
Status: DISCONTINUED | OUTPATIENT
Start: 2021-11-01 | End: 2021-11-01 | Stop reason: SDUPTHER

## 2021-11-01 RX ORDER — GLYCOPYRROLATE 1 MG/5 ML
SYRINGE (ML) INTRAVENOUS PRN
Status: DISCONTINUED | OUTPATIENT
Start: 2021-11-01 | End: 2021-11-01 | Stop reason: SDUPTHER

## 2021-11-01 RX ADMIN — Medication 50 MCG: at 13:45

## 2021-11-01 RX ADMIN — Medication 50 MCG: at 14:10

## 2021-11-01 RX ADMIN — Medication 50 MCG: at 13:58

## 2021-11-01 RX ADMIN — Medication 100 MCG: at 15:09

## 2021-11-01 RX ADMIN — PROTAMINE SULFATE 5 MG: 10 INJECTION, SOLUTION INTRAVENOUS at 15:02

## 2021-11-01 RX ADMIN — ONDANSETRON 4 MG: 2 INJECTION, SOLUTION INTRAMUSCULAR; INTRAVENOUS at 15:04

## 2021-11-01 RX ADMIN — Medication 100 MCG: at 14:37

## 2021-11-01 RX ADMIN — LIDOCAINE HYDROCHLORIDE 100 MG: 20 INJECTION, SOLUTION EPIDURAL; INFILTRATION; INTRACAUDAL; PERINEURAL at 13:14

## 2021-11-01 RX ADMIN — Medication 100 MCG: at 15:06

## 2021-11-01 RX ADMIN — ACETAMINOPHEN 650 MG: 325 TABLET ORAL at 18:41

## 2021-11-01 RX ADMIN — PROPOFOL 140 MG: 10 INJECTION, EMULSION INTRAVENOUS at 13:14

## 2021-11-01 RX ADMIN — Medication 50 MCG: at 13:52

## 2021-11-01 RX ADMIN — Medication 100 MCG: at 14:06

## 2021-11-01 RX ADMIN — ROCURONIUM BROMIDE 30 MG: 10 INJECTION, SOLUTION INTRAVENOUS at 13:14

## 2021-11-01 RX ADMIN — MIDAZOLAM 1 MG: 1 INJECTION INTRAMUSCULAR; INTRAVENOUS at 13:45

## 2021-11-01 RX ADMIN — Medication 50 MCG: at 13:49

## 2021-11-01 RX ADMIN — PROTAMINE SULFATE 95 MG: 10 INJECTION, SOLUTION INTRAVENOUS at 15:04

## 2021-11-01 RX ADMIN — Medication 25 MCG: at 13:14

## 2021-11-01 RX ADMIN — Medication 50 MCG: at 13:55

## 2021-11-01 RX ADMIN — Medication 75 MCG: at 13:40

## 2021-11-01 RX ADMIN — SODIUM CHLORIDE: 9 INJECTION, SOLUTION INTRAVENOUS at 13:21

## 2021-11-01 RX ADMIN — Medication 0.2 MG: at 15:10

## 2021-11-01 RX ADMIN — Medication 50 MCG: at 13:36

## 2021-11-01 RX ADMIN — Medication 100 MCG: at 14:29

## 2021-11-01 RX ADMIN — HEPARIN SODIUM 8000 UNITS: 1000 INJECTION, SOLUTION INTRAVENOUS; SUBCUTANEOUS at 13:52

## 2021-11-01 RX ADMIN — Medication 100 MCG: at 14:19

## 2021-11-01 RX ADMIN — Medication 50 MCG: at 13:23

## 2021-11-01 RX ADMIN — Medication 100 MCG: at 14:45

## 2021-11-01 RX ADMIN — Medication 25 MCG: at 14:47

## 2021-11-01 RX ADMIN — Medication 50 MCG: at 13:28

## 2021-11-01 RX ADMIN — FENTANYL CITRATE 25 MCG: 50 INJECTION INTRAMUSCULAR; INTRAVENOUS at 17:31

## 2021-11-01 RX ADMIN — Medication 100 MCG: at 14:21

## 2021-11-01 RX ADMIN — MIDAZOLAM 1 MG: 1 INJECTION INTRAMUSCULAR; INTRAVENOUS at 13:05

## 2021-11-01 RX ADMIN — SODIUM CHLORIDE: 9 INJECTION, SOLUTION INTRAVENOUS at 13:05

## 2021-11-01 ASSESSMENT — PULMONARY FUNCTION TESTS
PIF_VALUE: 20
PIF_VALUE: 20
PIF_VALUE: 21
PIF_VALUE: 20
PIF_VALUE: 19
PIF_VALUE: 22
PIF_VALUE: 18
PIF_VALUE: 21
PIF_VALUE: 12
PIF_VALUE: 20
PIF_VALUE: 10
PIF_VALUE: 27
PIF_VALUE: 19
PIF_VALUE: 20
PIF_VALUE: 1
PIF_VALUE: 20
PIF_VALUE: 1
PIF_VALUE: 20
PIF_VALUE: 21
PIF_VALUE: 20
PIF_VALUE: 21
PIF_VALUE: 20
PIF_VALUE: 20
PIF_VALUE: 19
PIF_VALUE: 20
PIF_VALUE: 10
PIF_VALUE: 1
PIF_VALUE: 1
PIF_VALUE: 20
PIF_VALUE: 20
PIF_VALUE: 19
PIF_VALUE: 19
PIF_VALUE: 18
PIF_VALUE: 20
PIF_VALUE: 21
PIF_VALUE: 19
PIF_VALUE: 18
PIF_VALUE: 21
PIF_VALUE: 22
PIF_VALUE: 20
PIF_VALUE: 20
PIF_VALUE: 18
PIF_VALUE: 18
PIF_VALUE: 19
PIF_VALUE: 19
PIF_VALUE: 21
PIF_VALUE: 18
PIF_VALUE: 20
PIF_VALUE: 19
PIF_VALUE: 19
PIF_VALUE: 0
PIF_VALUE: 1
PIF_VALUE: 19
PIF_VALUE: 20
PIF_VALUE: 20
PIF_VALUE: 10
PIF_VALUE: 20
PIF_VALUE: 19
PIF_VALUE: 18
PIF_VALUE: 20
PIF_VALUE: 19
PIF_VALUE: 18
PIF_VALUE: 19
PIF_VALUE: 20
PIF_VALUE: 22
PIF_VALUE: 20
PIF_VALUE: 1
PIF_VALUE: 19
PIF_VALUE: 20
PIF_VALUE: 18
PIF_VALUE: 20
PIF_VALUE: 20
PIF_VALUE: 22
PIF_VALUE: 21
PIF_VALUE: 22
PIF_VALUE: 22
PIF_VALUE: 20
PIF_VALUE: 22
PIF_VALUE: 20
PIF_VALUE: 21
PIF_VALUE: 21
PIF_VALUE: 20
PIF_VALUE: 20
PIF_VALUE: 10
PIF_VALUE: 19
PIF_VALUE: 19
PIF_VALUE: 20
PIF_VALUE: 10
PIF_VALUE: 3
PIF_VALUE: 20
PIF_VALUE: 22
PIF_VALUE: 3
PIF_VALUE: 20
PIF_VALUE: 2
PIF_VALUE: 18
PIF_VALUE: 10
PIF_VALUE: 21
PIF_VALUE: 0
PIF_VALUE: 3
PIF_VALUE: 19
PIF_VALUE: 20
PIF_VALUE: 19
PIF_VALUE: 19
PIF_VALUE: 20
PIF_VALUE: 8
PIF_VALUE: 21
PIF_VALUE: 10
PIF_VALUE: 22
PIF_VALUE: 10
PIF_VALUE: 20
PIF_VALUE: 20
PIF_VALUE: 2
PIF_VALUE: 19
PIF_VALUE: 21
PIF_VALUE: 19
PIF_VALUE: 19
PIF_VALUE: 21
PIF_VALUE: 2
PIF_VALUE: 20
PIF_VALUE: 19
PIF_VALUE: 20

## 2021-11-01 ASSESSMENT — PAIN SCALES - GENERAL
PAINLEVEL_OUTOF10: 0
PAINLEVEL_OUTOF10: 5
PAINLEVEL_OUTOF10: 0
PAINLEVEL_OUTOF10: 5
PAINLEVEL_OUTOF10: 5

## 2021-11-01 ASSESSMENT — PAIN DESCRIPTION - PAIN TYPE: TYPE: CHRONIC PAIN

## 2021-11-01 ASSESSMENT — PAIN - FUNCTIONAL ASSESSMENT: PAIN_FUNCTIONAL_ASSESSMENT: 0-10

## 2021-11-01 ASSESSMENT — PAIN DESCRIPTION - ORIENTATION: ORIENTATION: RIGHT

## 2021-11-01 ASSESSMENT — PAIN DESCRIPTION - LOCATION: LOCATION: NECK;SHOULDER

## 2021-11-01 NOTE — ANESTHESIA POSTPROCEDURE EVALUATION
Department of Anesthesiology  Postprocedure Note    Patient: Dominique Avelar  MRN: [id number]  YOB: 1950  Date of evaluation: 11/1/2021  Time:  4:05 PM     Procedure Summary     Date: 11/01/21 Room / Location: [facility]    Anesthesia Start: 1305 Anesthesia Stop: 1539    Procedure: ABLATION Diagnosis:     Scheduled Providers:  Responsible Provider: Max Lazar MD    Anesthesia Type: general ASA Status: 4          Anesthesia Type: general    Adrián Phase I:      Adrián Phase II:      Last vitals: Reviewed and per EMR flowsheets.        Anesthesia Post Evaluation    Patient location during evaluation: PACU  Patient participation: complete - patient participated  Level of consciousness: awake and alert  Airway patency: patent  Nausea & Vomiting: no nausea and no vomiting  Complications: no  Cardiovascular status: hemodynamically stable  Respiratory status: acceptable  Hydration status: stable Pt noted to have increased body tremors

## 2021-11-01 NOTE — PROGRESS NOTES
Pt d/c to home with sister Shannon Lennon. No complications in PACU. Pt ambulated without difficulties. No bleeding to groin.

## 2021-11-01 NOTE — OP NOTE
Operative Note      Patient: Sonny Mclean  YOB: 1950  MRN: [id number]    Date of Procedure: 11/1/2021    Pre-Op Diagnosis: Persistent atrial fibrillation    Post-Op Diagnosis: Same     Electrophysiology study with cryoablation      Estimated Blood Loss (mL): Minimal    Complications: None      Detailed Description of Procedure:   Electrophysiology study and cryoablation. PERFORMED BY:  Lui Camarillo M.D. INDICATION:  Paroxysmal atrial fibrillation which has failed medical therapy. HISTORY OF PRESENT ILLNESS:  50-year-old  female with recurrent episodes of atrial fibrillation. She progressed from paroxysmal to persistent and presents for EP study and ablation due to failure of drug therapy. The risks, indications, benefits, as well as alternatives to the procedure  were discussed with the patient. The risks include but are not limited to  bleeding, vessel perforation, AV fistula formation, rarely stroke, or other  imponderables. The patient understood and wished to proceed. TECHNIQUE:  The patient was brought to the electrophysiology lab in a fasting state and  hooked onto continuous hemodynamic monitoring. This includes continuous  pulse oximetry, telemetry, and continuous blood pressure recording. He  underwent general anesthesia as per the anesthesia team.  A preprocedure  transesophageal echocardiogram was done and showed no left atrial appendage thrombus. There was however sludging in the left atrium due to slow flow. Using vascular ultrasound the right femoral vein was accessed 3times and 3 sheaths were placed in the vein. A decapolar catheter was advanced to the coronary sinus,and an  intracardiac echocardiogram catheter was advanced to the right atrium. The  ICE catheter was used to map the left atrium and also to visualize the  fossa ovalis for transseptal puncture. A long SL-1 sheath was then  advanced to the superior vena cava.   Using fluoroscopy and

## 2021-11-01 NOTE — ANESTHESIA PRE PROCEDURE
Department of Anesthesiology  Preprocedure Note       Name:  Kiesha Huitron   Age:  66 y.o.  :  1950                                          MRN:  [id number]         Date:  2021      Surgeon: * No surgeons listed *    Procedure: * No procedures listed *    Medications prior to admission:   Prior to Admission medications    Medication Sig Start Date End Date Taking? Authorizing Provider   XARELTO 20 MG TABS tablet Take 20 mg by mouth Daily with supper  21  Yes Historical Provider, MD   furosemide (LASIX) 40 MG tablet Take 40 mg by mouth daily   Yes Historical Provider, MD   Multiple Vitamins-Minerals (THERAPEUTIC MULTIVITAMIN-MINERALS) tablet Take 1 tablet by mouth daily   Yes Historical Provider, MD   sotalol (BETAPACE) 80 MG tablet Take 80 mg by mouth 2 times daily Pt. Takes daily. States causes dizziness if she takes it twice a day   Yes Historical Provider, MD   aspirin 81 MG tablet Take 81 mg by mouth daily. Yes Historical Provider, MD   Handicap Placard MISC by Does not apply route  5 years from origninal written date 2022    Dx: Osteoarthritis unable to ambulate over 150ft 17   Faina Garcia MD       Current medications:    No current facility-administered medications for this encounter.        Allergies:  No Known Allergies    Problem List:    Patient Active Problem List   Diagnosis Code    Paroxysmal A-fib ([facility] I48.0    Primary osteoarthritis involving multiple joints M89.49    Dysthymia F34.1    Tricuspid regurgitation I07.1    Osteoarthritis M19.90    Spinal stenosis at L4-L5 level M48.061    Dyspnea on exertion R06.00    Moderate to severe pulmonary hypertension (HCC) I27.20    JOSE on CPAP G47.33, Z99.89    Deep vein thrombosis (DVT) of proximal lower extremity (HCC) I82.4Y9    Chronic 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)  I82.531    Embolism and thrombosis of both popliteal veins (HCC)  I82.433    Status post cardiac catheterization Z98.890    Asthmatic bronchitis, unspecified asthma severity, uncomplicated M86.568    Balance problems R26.89    Chronic diastolic (congestive) heart failure (HCC) I50.32    Chronic kidney disease, stage III (moderate) (HCC) N18.30    Essential hypertension I10    Gait disturbance R26.9    Idiopathic peripheral neuropathy G60.9    Laryngopharyngeal reflux (LPR) K21.9    Leg length discrepancy M21.70    Limp R26.89    Peripheral polyneuropathy G62.9    Primary osteoarthritis of right hip M16.11    Pulmonary scarring J98.4    Restrictive lung disease J98.4    Enthesopathy M77.9    Kyphosis of thoracic region M40.204    Spondylolisthesis at L5-S1 level M43.17    Spondylosis of lumbar region without myelopathy or radiculopathy M47.816    Supraventricular tachycardia (HCC) I47.1    Trochanteric bursitis of right hip M70.61    Valvular heart disease I38    Osteoarthritis of knee M17.10       Past Medical History:        Diagnosis Date    Anxiety     Arthritis     RA    Asthmatic bronchitis, unspecified asthma severity, uncomplicated 10/80/5400    Atrial fibrillation (HCC)     not recurrent    Dyspnea on exertion 05/09/2018    GERD (gastroesophageal reflux disease)     Hyperlipidemia     diet controlled    Hypertension     Mitral valve disorder     Moderate to severe pulmonary hypertension ([address] 05/09/2018    Osteoarthritis     Palpitation     Physical deconditioning     Shortness of breath     with excertion work up is negative    Sleep apnea     not on cpap    Spinal stenosis at L4-L5 level     Stage 3b chronic kidney disease ([address] 2020    Tachycardia     Tricuspid regurgitation     mild       Past Surgical History:        Procedure Laterality Date    CARDIAC CATHETERIZATION  05/19/2014    CARDIAC CATHETERIZATION  01/20/2016    COLONOSCOPY      ENDOSCOPY, COLON, DIAGNOSTIC      JOINT REPLACEMENT Right     total knee    JOINT REPLACEMENT Left     total knee    KNEE JOINT MANIPULATION Right     SPINE SURGERY  07/2017    lumbar fusion       Social History:    Social History     Tobacco Use    Smoking status: Never Smoker    Smokeless tobacco: Never Used   Substance Use Topics    Alcohol use: Yes     Alcohol/week: 5.0 standard drinks     Types: 5 Cans of beer per week     Comment: weekly                                Counseling given: Not Answered      Vital Signs (Current):   Vitals:    11/01/21 1156 11/01/21 1157   BP:  134/86   Pulse:  122   Resp:  20   Temp:  97.5 °F (36.4 °C)   TempSrc:  Temporal   SpO2:  98%   Weight: 145 lb 1.6 oz (65.8 kg)    Height: 5' 6\" (1.676 m)                                               BP Readings from Last 3 Encounters:   11/01/21 134/86   08/19/21 122/64   06/14/21 122/60       NPO Status:                                                                                 BMI:   Wt Readings from Last 3 Encounters:   11/01/21 145 lb 1.6 oz (65.8 kg)   10/27/21 148 lb (67.1 kg)   08/19/21 148 lb 3.2 oz (67.2 kg)     Body mass index is 23.42 kg/m². CBC:   Lab Results   Component Value Date    WBC 3.8 02/13/2015    RBC 2.08 02/13/2015    HGB 7.8 02/14/2015    HCT 24.5 02/14/2015    MCV 89.6 02/13/2015    RDW 14.9 02/13/2015     02/13/2015       CMP:   Lab Results   Component Value Date     02/12/2015    K 4.5 02/12/2015     02/12/2015    CO2 28 02/12/2015    BUN 19 02/12/2015    CREATININE 0.99 02/12/2015    GFRAA >60 02/12/2015    LABGLOM 56 02/12/2015    GLUCOSE 116 02/12/2015    CALCIUM 8.6 02/12/2015       POC Tests: No results for input(s): POCGLU, POCNA, POCK, POCCL, POCBUN, POCHEMO, POCHCT in the last 72 hours.     Coags: No results found for: PROTIME, INR, APTT    HCG (If Applicable): No results found for: PREGTESTUR, PREGSERUM, HCG, HCGQUANT     ABGs: No results found for: PHART, PO2ART, OKN0UJT, ZFW5SMO, BEART, [id number]     Type & Screen (If Applicable):  No results found for: LABABO, LABRH    Drug/Infectious Status (If

## 2021-11-01 NOTE — H&P
History and Physical Service   [place] [address]    HISTORY AND PHYSICAL EXAMINATION            Date of Evaluation: 11/1/2021  Patient name:  Theresa Akhtar  MRN:   [id number]  YOB: 1950  PCP:    Luis Rivera MD    History Obtained From:     Patient, medical records    History of Present Illness: This is Theresa Akhtar a 66 y.o. female with known HTN, HLD, CDK stage III who presents today for a cardiac cryoablation by Dr. Alexander Ly for paroxysmal to persistent atria fibrillation. Patient following with Cardiologist Dr Rosina Villanueva and was referred to Dr Alexander Ly for Hx symptomatic paroxysmal to persistent atrial fibrillation. According to Dr Leonidas Page notes of 8/17/21 Patient has Hx SVT, MR, TR, JOSE with ANDERSON that are \"possible multifactorial with both cardiac and pulmonary etiology. \" He advised cardiac ablation and presents today for her procedure. Patient continues to have ANDERSON She denies increased SOB today, dizziness or lightheadedness at present, syncope or chest pain. No fever, chills, cough, wheezing, Hx DM, open sores or wounds.   On dual anticoagulant therapy Last ASA 81mg and Xarelto 10/31/21     Past Medical History:     Past Medical History:   Diagnosis Date    Anxiety     Arthritis     RA    Asthmatic bronchitis, unspecified asthma severity, uncomplicated 65/59/4784    Atrial fibrillation (HCC)     not recurrent    Dyspnea on exertion 05/09/2018    GERD (gastroesophageal reflux disease)     Hyperlipidemia     diet controlled    Hypertension     Mitral valve disorder     Moderate to severe pulmonary hypertension ([address] 05/09/2018    Osteoarthritis     Palpitation     Physical deconditioning     Shortness of breath     with excertion work up is negative    Sleep apnea     not on cpap    Spinal stenosis at L4-L5 level     Stage 3b chronic kidney disease ([address] 2020    Tachycardia     Tricuspid regurgitation     mild        Past Surgical History:     Past Surgical History:   Procedure Laterality Date    CARDIAC CATHETERIZATION  2014    CARDIAC CATHETERIZATION  2016    COLONOSCOPY      ENDOSCOPY, COLON, DIAGNOSTIC      JOINT REPLACEMENT Right     total knee    JOINT REPLACEMENT Left     total knee    KNEE JOINT MANIPULATION Right     SPINE SURGERY  2017    lumbar fusion        Medications Prior to Admission:     Prior to Admission medications    Medication Sig Start Date End Date Taking? Authorizing Provider   XARELTO 20 MG TABS tablet Take 20 mg by mouth Daily with supper  21  Yes Historical Provider, MD   furosemide (LASIX) 40 MG tablet Take 40 mg by mouth daily   Yes Historical Provider, MD   Multiple Vitamins-Minerals (THERAPEUTIC MULTIVITAMIN-MINERALS) tablet Take 1 tablet by mouth daily   Yes Historical Provider, MD   sotalol (BETAPACE) 80 MG tablet Take 80 mg by mouth 2 times daily Pt. Takes daily. States causes dizziness if she takes it twice a day   Yes Historical Provider, MD   aspirin 81 MG tablet Take 81 mg by mouth daily. Yes Historical Provider, MD   Handicap Placard MISC by Does not apply route  5 years from origninal written date 2022    Dx: Osteoarthritis unable to ambulate over 150ft 17   Marsha Romero MD        Allergies:     Patient has no known allergies. Social History:     Tobacco:    reports that she has never smoked. She has never used smokeless tobacco.  Alcohol:      reports current alcohol use of about 5.0 standard drinks of alcohol per week. Drug Use:  reports no history of drug use. Family History:     Family History   Problem Relation Age of Onset    High Blood Pressure Mother     Arthritis Mother     Diabetes Father     Arthritis Sister        Review of Systems:     Positive and Negative as described in HPI.     CONSTITUTIONAL:  negative for fevers, chills, sweats, fatigue, weight loss  HEENT: glasses  negative for vision, hearing changes, runny nose, throat pain  RESPIRATORY: +ANDERSON  negative for shortness of breath, cough, congestion, wheezing. CARDIOVASCULAR: See HPI  Hx persistent atrial fibrillation  Hx HTN  negative for chest pain  GASTROINTESTINAL:  negative for nausea, vomiting, diarrhea, constipation, change in bowel habits, abdominal pain   GENITOURINARY:  negative for difficulty of urination, burning with urination, frequency   INTEGUMENT:  negative for rash, skin lesions, easy bruising   HEMATOLOGIC/LYMPHATIC:  negative for swelling/edema   ALLERGIC/IMMUNOLOGIC:  negative for urticaria , itching  ENDOCRINE:  negative increase in drinking, increase in urination, hot or cold intolerance  MUSCULOSKELETAL: + arthralgia  negative joint pains, muscle aches, swelling of joints  NEUROLOGICAL:  negative for headaches, dizziness, lightheadedness, numbness, pain, tingling extremities  BEHAVIOR/PSYCH:  negative for depression, anxiety    Physical Exam:   /86   Pulse 122   Temp 97.5 °F (36.4 °C) (Temporal)   Resp 20   Ht 5' 6\" (1.676 m)   Wt 145 lb 1.6 oz (65.8 kg)   SpO2 98%   BMI 23.42 kg/m²   No LMP recorded. Patient is postmenopausal.  No obstetric history on file. No results for input(s): POCGLU in the last 72 hours. General Appearance:  alert, well appearing, and in no acute distress  Mental status: oriented to person, place, and time with normal affect  Head:  normocephalic, atraumatic. Eye: glasses no icterus, redness, pupils equal and reactive, extraocular eye movements intact, conjunctiva clear  Ear: normal external ear, no discharge, hearing intact  Nose:  no drainage noted  Mouth: mucous membranes moist  Neck: supple, no carotid bruits, thyroid not palpable  Lungs: SpO2 98% on room air Bilateral equal air entry, unlabored at rest w/o use of accessory muscles, no wheezing, rales or rhonchi, normal effort  Cardiovascular:  Atrial fibrillation with RVR documented on EKG. no murmur, gallop, rub.   Abdomen: Soft, nontender, nondistended, normal bowel

## 2021-11-02 LAB
EKG ATRIAL RATE: 51 BPM
EKG ATRIAL RATE: 94 BPM
EKG P AXIS: 68 DEGREES
EKG P-R INTERVAL: 176 MS
EKG Q-T INTERVAL: 274 MS
EKG Q-T INTERVAL: 590 MS
EKG QRS DURATION: 88 MS
EKG QRS DURATION: 92 MS
EKG QTC CALCULATION (BAZETT): 389 MS
EKG QTC CALCULATION (BAZETT): 543 MS
EKG R AXIS: 38 DEGREES
EKG R AXIS: 5 DEGREES
EKG T AXIS: -154 DEGREES
EKG T AXIS: 47 DEGREES
EKG VENTRICULAR RATE: 121 BPM
EKG VENTRICULAR RATE: 51 BPM

## 2022-02-07 ENCOUNTER — TELEPHONE (OUTPATIENT)
Dept: FAMILY MEDICINE CLINIC | Age: 72
End: 2022-02-07

## 2022-02-07 NOTE — TELEPHONE ENCOUNTER
Pt called in about her appt that she called in this morning wanting to know is she can come in today.  Please Advise

## 2022-02-07 NOTE — TELEPHONE ENCOUNTER
----- Message from Augusta Dates sent at 2/7/2022  9:43 AM EST -----  Subject: Message to Provider    QUESTIONS  Information for Provider? Patient is calling because she has a scheduled   appointment for tomorrow for rib pain ( she stated she fell last week),   but she would like to know if she could possibly come in today. Please   advise  ---------------------------------------------------------------------------  --------------  CALL BACK INFO  What is the best way for the office to contact you? OK to leave message on   voicemail  Preferred Call Back Phone Number? [phone]  ---------------------------------------------------------------------------  --------------  SCRIPT ANSWERS  Relationship to Patient?  Self

## 2022-02-07 NOTE — TELEPHONE ENCOUNTER
I can see her this afternoon, can double book at 2:45. Or if she wants to wait till tomorrow to be seen I could send an order for an x-ray today.

## 2022-02-08 ENCOUNTER — OFFICE VISIT (OUTPATIENT)
Dept: FAMILY MEDICINE CLINIC | Age: 72
End: 2022-02-08
Payer: MEDICARE

## 2022-02-08 VITALS — WEIGHT: 145.2 LBS | BODY MASS INDEX: 23.44 KG/M2 | SYSTOLIC BLOOD PRESSURE: 120 MMHG | DIASTOLIC BLOOD PRESSURE: 68 MMHG

## 2022-02-08 DIAGNOSIS — B02.9 HERPES ZOSTER WITHOUT COMPLICATION: ICD-10-CM

## 2022-02-08 DIAGNOSIS — M25.551 BILATERAL HIP PAIN: ICD-10-CM

## 2022-02-08 DIAGNOSIS — M85.88 OSTEOPENIA OF LUMBAR SPINE: ICD-10-CM

## 2022-02-08 DIAGNOSIS — M25.552 BILATERAL HIP PAIN: ICD-10-CM

## 2022-02-08 DIAGNOSIS — Z12.31 ENCOUNTER FOR SCREENING MAMMOGRAM FOR MALIGNANT NEOPLASM OF BREAST: ICD-10-CM

## 2022-02-08 DIAGNOSIS — S20.211A CONTUSION OF RIB ON RIGHT SIDE, INITIAL ENCOUNTER: Primary | ICD-10-CM

## 2022-02-08 DIAGNOSIS — Z78.0 MENOPAUSE: ICD-10-CM

## 2022-02-08 PROCEDURE — G8484 FLU IMMUNIZE NO ADMIN: HCPCS | Performed by: FAMILY MEDICINE

## 2022-02-08 PROCEDURE — 99214 OFFICE O/P EST MOD 30 MIN: CPT | Performed by: FAMILY MEDICINE

## 2022-02-08 PROCEDURE — 1036F TOBACCO NON-USER: CPT | Performed by: FAMILY MEDICINE

## 2022-02-08 PROCEDURE — 4040F PNEUMOC VAC/ADMIN/RCVD: CPT | Performed by: FAMILY MEDICINE

## 2022-02-08 PROCEDURE — 1123F ACP DISCUSS/DSCN MKR DOCD: CPT | Performed by: FAMILY MEDICINE

## 2022-02-08 PROCEDURE — G8427 DOCREV CUR MEDS BY ELIG CLIN: HCPCS | Performed by: FAMILY MEDICINE

## 2022-02-08 PROCEDURE — G8420 CALC BMI NORM PARAMETERS: HCPCS | Performed by: FAMILY MEDICINE

## 2022-02-08 PROCEDURE — 1090F PRES/ABSN URINE INCON ASSESS: CPT | Performed by: FAMILY MEDICINE

## 2022-02-08 PROCEDURE — G8399 PT W/DXA RESULTS DOCUMENT: HCPCS | Performed by: FAMILY MEDICINE

## 2022-02-08 PROCEDURE — 3017F COLORECTAL CA SCREEN DOC REV: CPT | Performed by: FAMILY MEDICINE

## 2022-02-08 RX ORDER — VALACYCLOVIR HYDROCHLORIDE 1 G/1
1000 TABLET, FILM COATED ORAL 3 TIMES DAILY
Qty: 21 TABLET | Refills: 0 | Status: SHIPPED | OUTPATIENT
Start: 2022-02-08 | End: 2022-02-15

## 2022-02-08 RX ORDER — LIDOCAINE 50 MG/G
1 PATCH TOPICAL DAILY
Qty: 10 PATCH | Refills: 0 | Status: SHIPPED | OUTPATIENT
Start: 2022-02-08 | End: 2022-02-18

## 2022-02-08 ASSESSMENT — ENCOUNTER SYMPTOMS
SHORTNESS OF BREATH: 0
EYES NEGATIVE: 1
COUGH: 0
ABDOMINAL PAIN: 0
ALLERGIC/IMMUNOLOGIC NEGATIVE: 1

## 2022-02-08 NOTE — PROGRESS NOTES
[address] [place] [address] [facility] [place] [facility] [place] [address]  Dept: [phone]    2/8/2022    CHIEF COMPLAINT    Chief Complaint   Patient presents with    Chest Pain     rib pain  rt side       HPI    João Lema is a 67 y.o. female who presents   Chief Complaint   Patient presents with    Chest Pain     rib pain  rt side   . 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 She was dehydrated and felt faint, passed out. Was seen by EMS but did not go to ED. Having pain in rt lower, anterior ribs. Taking sotalol and xeralto. Was on lasix but it was discontinued, rt leg was swollen a few weeks ago and she took lasix which resolved sx. Vitals:    02/08/22 1004   BP: 120/68   Weight: 145 lb 3.2 oz (65.9 kg)       REVIEW OF SYSTEMS    Review of Systems   Constitutional: Negative for fatigue, fever and unexpected weight change. HENT: Negative. Eyes: Negative. Respiratory: Negative for cough and shortness of breath. Cardiovascular: Negative for chest pain and leg swelling. Gastrointestinal: Negative for abdominal pain. Endocrine: Negative. Genitourinary: Negative for frequency and urgency. Musculoskeletal: Positive for arthralgias and gait problem. Skin: Positive for rash (rt lateral ribs). Allergic/Immunologic: Negative. Neurological: Negative for dizziness and headaches. Hematological: Negative. Psychiatric/Behavioral: Negative for sleep disturbance. The patient is not nervous/anxious.         PAST MEDICAL HISTORY    Past Medical History:   Diagnosis Date    Anxiety     Arthritis     RA    Asthmatic bronchitis, unspecified asthma severity, uncomplicated 06/42/8040    Atrial fibrillation (HCC)     not recurrent    Dyspnea on exertion 05/09/2018    GERD (gastroesophageal reflux disease)     Hyperlipidemia     diet controlled    Hypertension     Mitral valve disorder     Moderate to severe pulmonary hypertension ([facility] 05/09/2018    Osteoarthritis     Palpitation     Physical deconditioning     Shortness of breath     with excertion work up is negative    Sleep apnea     not on cpap    Spinal stenosis at L4-L5 level     Stage 3b chronic kidney disease (HCC) 2020    Tachycardia     Tricuspid regurgitation     mild       FAMILY HISTORY    Family History   Problem Relation Age of Onset    High Blood Pressure Mother     Arthritis Mother     Diabetes Father     Arthritis Sister        SOCIAL HISTORY    Social History     Socioeconomic History    Marital status: Single     Spouse name: Not on file    Number of children: Not on file    Years of education: Not on file    Highest education level: Not on file   Occupational History    Not on file   Tobacco Use    Smoking status: Never Smoker    Smokeless tobacco: Never Used   Substance and Sexual Activity    Alcohol use: Yes     Alcohol/week: 5.0 standard drinks     Types: 5 Cans of beer per week     Comment: weekly    Drug use: No    Sexual activity: Never   Other Topics Concern    Not on file   Social History Narrative    Not on file     Social Determinants of Health     Financial Resource Strain: Low Risk     Difficulty of Paying Living Expenses: Not hard at all   Food Insecurity: No Food Insecurity    Worried About Running Out of Food in the Last Year: Never true    Mai of Food in the Last Year: Never true   Transportation Needs: No Transportation Needs    Lack of Transportation (Medical): No    Lack of Transportation (Non-Medical):  No   Physical Activity:     Days of Exercise per Week: Not on file    Minutes of Exercise per Session: Not on file   Stress:     Feeling of Stress : Not on file   Social Connections:     Frequency of Communication with Friends and Family: Not on file    Frequency of Social Gatherings with Friends and Family: Not on file    Attends Quaker Services: Not on file   [employer] of Clubs or Organizations: Not on file    Attends Club or Organization Meetings: Not on file    Marital Status: Not on file   Intimate Partner Violence:     Fear of Current or Ex-Partner: Not on file    Emotionally Abused: Not on file    Physically Abused: Not on file    Sexually Abused: Not on file   Housing Stability:     Unable to Pay for Housing in the Last Year: Not on file    Number of [place] in the Last Year: Not on file    Unstable Housing in the Last Year: Not on file       SURGICAL HISTORY    Past Surgical History:   Procedure Laterality Date    CARDIAC CATHETERIZATION  2014   [address]  2016    CARDIAC SURGERY  2021    ablation for a-fib    COLONOSCOPY      ENDOSCOPY, COLON, DIAGNOSTIC      JOINT REPLACEMENT Right     total knee    JOINT REPLACEMENT Left     total knee    KNEE JOINT MANIPULATION Right     SPINE SURGERY  2017    lumbar fusion       CURRENT MEDICATIONS    Current Outpatient Medications   Medication Sig Dispense Refill    valACYclovir (VALTREX) 1 g tablet Take 1 tablet by mouth 3 times daily for 7 days 21 tablet 0    lidocaine (LIDODERM) 5 % Place 1 patch onto the skin daily for 10 days 12 hours on, 12 hours off. 10 patch 0    XARELTO 20 MG TABS tablet Take 20 mg by mouth Daily with supper       furosemide (LASIX) 40 MG tablet Take 40 mg by mouth daily      Multiple Vitamins-Minerals (THERAPEUTIC MULTIVITAMIN-MINERALS) tablet Take 1 tablet by mouth daily      sotalol (BETAPACE) 80 MG tablet Take 80 mg by mouth 2 times daily Pt. Takes daily. States causes dizziness if she takes it twice a day      Handicap Placard MISC by Does not apply route  5 years from origninal written date 2022    Dx: Osteoarthritis unable to ambulate over 150ft 1 each 0    aspirin 81 MG tablet Take 81 mg by mouth daily. No current facility-administered medications for this visit.        ALLERGIES    No Known Allergies    PHYSICAL EXAM Physical Exam  Vitals reviewed. Constitutional:       Appearance: She is well-developed. HENT:      Head: Normocephalic. Eyes:      Conjunctiva/sclera: Conjunctivae normal.      Pupils: Pupils are equal, round, and reactive to light. Neck:      Thyroid: No thyromegaly. Cardiovascular:      Rate and Rhythm: Normal rate and regular rhythm. Heart sounds: Normal heart sounds. No murmur heard. Pulmonary:      Effort: Pulmonary effort is normal.      Breath sounds: Normal breath sounds. No wheezing or rales. Chest:       Abdominal:      Palpations: Abdomen is soft. Tenderness: There is no abdominal tenderness. Musculoskeletal:         General: No tenderness or deformity. Normal range of motion. Cervical back: Normal range of motion and neck supple. Lymphadenopathy:      Cervical: No cervical adenopathy. Skin:     General: Skin is warm and dry. Neurological:      Mental Status: She is alert and oriented to person, place, and time. Psychiatric:         Mood and Affect: Mood normal.         Behavior: Behavior normal.         Judgment: Judgment normal.         ASSESSMENT/PLAN  1. Contusion of rib on right side, initial encounter  Discussed that even if she did have a nondisplaced rib fracture there be no treatment other than continued rest and deep breathing. Try splinting chest wall if sneezing or coughing    2. Herpes zoster without complication  Take Valtrex. If worsening symptoms could add Neurontin  - valACYclovir (VALTREX) 1 g tablet; Take 1 tablet by mouth 3 times daily for 7 days  Dispense: 21 tablet; Refill: 0  - lidocaine (LIDODERM) 5 %; Place 1 patch onto the skin daily for 10 days 12 hours on, 12 hours off. Dispense: 10 patch; Refill: 0    3. Osteopenia of lumbar spine  Continue calcium, vitamin D and weightbearing exercise  - DEXA BONE DENSITY AXIAL SKELETON; Future    4.  Encounter for screening mammogram for malignant neoplasm of breast    - ROSE[facility]N DIGITAL SCREEN W OR WO CAD BILATERAL; Future    5. Menopause    - DEXA BONE DENSITY AXIAL SKELETON; Future    6. Bilateral hip pain  She would like to try physical therapy. Patient prefers [facility]. - External Referral To Physical Therapy       Keron Monte received counseling on the following healthy behaviors: nutrition, exercise and medication adherence  Reviewed prior labs and health maintenance. Continue current medications, diet and exercise. Discussed use, benefit, and side effects of prescribed medications. Barriers to medication compliance addressed. Patient given educational materials - see patient instructions. All patient questions answered. Patient voiced understanding. Return if symptoms worsen or fail to improve.         Electronically signed by Mary Jo Pearl MD on 2/8/22 at 10:18 AM EST

## 2022-03-08 ENCOUNTER — TELEPHONE (OUTPATIENT)
Dept: FAMILY MEDICINE CLINIC | Age: 72
End: 2022-03-08

## 2022-03-08 NOTE — TELEPHONE ENCOUNTER
----- Message from Charo Wheeler LPN sent at 5/5/7811  3:27 PM EST -----  Subject: Appointment Request    Reason for Call: Routine Existing Condition Follow Up    QUESTIONS  patient called to schedule follow up   on shingles still having a lot of pain it has been about 6 weeks since   initial break out      Called pt to schedule a VV for today but pt declined appt, writer offered an appt when PCP was available and both CNPs are booked up, pt said that was ridiculous and hung up.  Please advise

## 2022-03-08 NOTE — TELEPHONE ENCOUNTER
----- Message from Nohemi Mendoza LPN sent at 8/6/1817  3:27 PM EST -----  Subject: Appointment Request    Reason for Call: Routine Existing Condition Follow Up    QUESTIONS  Type of Appointment? Established Patient  Reason for appointment request? No appointments available during search  Additional Information for Provider? patient called to schedule follow up   on shingles still having a lot of pain it has been about 6 weeks since   initial break out  ---------------------------------------------------------------------------  --------------  CALL BACK INFO  What is the best way for the office to contact you? OK to leave message on   voicemail  Preferred Call Back Phone Number? [phone]  ---------------------------------------------------------------------------  --------------  SCRIPT ANSWERS  Relationship to Patient? Self  Is this follow up request related to routine Diabetes Management? No  Have you been diagnosed with, awaiting test results for, or told that you   are suspected of having COVID-19 (Coronavirus)? (If patient has tested   negative or was tested as a requirement for work, school, or travel and   not based on symptoms, answer no)? No  Within the past 10 days have you developed any of the following symptoms   (answer no if symptoms have been present longer than 10 days or began   more than 10 days ago)? Fever or Chills, Cough, Shortness of breath or   difficulty breathing, Loss of taste or smell, Sore throat, Nasal   congestion, Sneezing or runny nose, Fatigue or generalized body aches   (answer no if pain is specific to a body part e.g. back pain), Diarrhea,   Headache? No  Have you had close contact with someone with COVID-19 in the last 7 days? No  (Service Expert  click yes below to proceed with [employer]   Scheduling)?  Yes

## 2022-03-09 ENCOUNTER — OFFICE VISIT (OUTPATIENT)
Dept: FAMILY MEDICINE CLINIC | Age: 72
End: 2022-03-09
Payer: MEDICARE

## 2022-03-09 VITALS
BODY MASS INDEX: 22.28 KG/M2 | HEART RATE: 56 BPM | WEIGHT: 147 LBS | TEMPERATURE: 97 F | DIASTOLIC BLOOD PRESSURE: 72 MMHG | SYSTOLIC BLOOD PRESSURE: 125 MMHG | OXYGEN SATURATION: 98 % | HEIGHT: 68 IN

## 2022-03-09 DIAGNOSIS — R26.89 BALANCE DISORDER: ICD-10-CM

## 2022-03-09 DIAGNOSIS — B02.29 POST HERPETIC NEURALGIA: Primary | ICD-10-CM

## 2022-03-09 DIAGNOSIS — I82.531 CHRONIC EMBOLISM AND THROMBOSIS OF RIGHT POPLITEAL VEIN (HCC): ICD-10-CM

## 2022-03-09 DIAGNOSIS — N18.31 STAGE 3A CHRONIC KIDNEY DISEASE (HCC): ICD-10-CM

## 2022-03-09 DIAGNOSIS — R73.09 ABNORMAL GLUCOSE: ICD-10-CM

## 2022-03-09 DIAGNOSIS — I27.20 MODERATE TO SEVERE PULMONARY HYPERTENSION (HCC): ICD-10-CM

## 2022-03-09 DIAGNOSIS — I48.21 PERMANENT ATRIAL FIBRILLATION (HCC): ICD-10-CM

## 2022-03-09 LAB — HBA1C MFR BLD: 5.2 %

## 2022-03-09 PROCEDURE — 83036 HEMOGLOBIN GLYCOSYLATED A1C: CPT | Performed by: FAMILY MEDICINE

## 2022-03-09 PROCEDURE — G8484 FLU IMMUNIZE NO ADMIN: HCPCS | Performed by: FAMILY MEDICINE

## 2022-03-09 PROCEDURE — 1123F ACP DISCUSS/DSCN MKR DOCD: CPT | Performed by: FAMILY MEDICINE

## 2022-03-09 PROCEDURE — 99214 OFFICE O/P EST MOD 30 MIN: CPT | Performed by: FAMILY MEDICINE

## 2022-03-09 PROCEDURE — 1036F TOBACCO NON-USER: CPT | Performed by: FAMILY MEDICINE

## 2022-03-09 PROCEDURE — G8427 DOCREV CUR MEDS BY ELIG CLIN: HCPCS | Performed by: FAMILY MEDICINE

## 2022-03-09 PROCEDURE — 4040F PNEUMOC VAC/ADMIN/RCVD: CPT | Performed by: FAMILY MEDICINE

## 2022-03-09 PROCEDURE — 3017F COLORECTAL CA SCREEN DOC REV: CPT | Performed by: FAMILY MEDICINE

## 2022-03-09 PROCEDURE — G8399 PT W/DXA RESULTS DOCUMENT: HCPCS | Performed by: FAMILY MEDICINE

## 2022-03-09 PROCEDURE — G8420 CALC BMI NORM PARAMETERS: HCPCS | Performed by: FAMILY MEDICINE

## 2022-03-09 PROCEDURE — 1090F PRES/ABSN URINE INCON ASSESS: CPT | Performed by: FAMILY MEDICINE

## 2022-03-09 RX ORDER — GABAPENTIN 100 MG/1
100 CAPSULE ORAL 3 TIMES DAILY
Qty: 90 CAPSULE | Refills: 0 | Status: ON HOLD | OUTPATIENT
Start: 2022-03-09 | End: 2022-05-31

## 2022-03-09 ASSESSMENT — ENCOUNTER SYMPTOMS
EYES NEGATIVE: 1
DIARRHEA: 0
SHORTNESS OF BREATH: 0
COUGH: 0
ABDOMINAL PAIN: 0
ALLERGIC/IMMUNOLOGIC NEGATIVE: 1
CONSTIPATION: 0

## 2022-03-09 NOTE — PROGRESS NOTES
[facility]  [address]  [address] [address] [facility] [address]  Dept: [phone]    3/9/2022    CHIEF COMPLAINT    Chief Complaint   Patient presents with    Herpes Zoster     pain on left side of stomach & back - some itching       HPI    Wadell Boxer is a 67 y.o. female who presents   Chief Complaint   Patient presents with    Herpes Zoster     pain on left side of stomach & back - some itching   . Recheck shingles left lateral lower abd. Still has pain but rash has faded. Is using over-the-counter IcyHot patches or ice packs. Currently going to physical therapy for hip pain. They are doing some balance exercises with her. She describes feeling off balance frequently feel like she has to hold onto stable items to prevent falling. She has not had any recent falls. She thinks her symptoms could be due to sotalol which she takes for A. fib. She has reduced her dose of sotalol from twice daily to once daily. She is due to follow-up with cardiologist.  History of prior A1c of 6.2. Also has chronic kidney disease stage III. Referral was provided for nephrologist at last appointment which she has not followed through with. She intends to make an appointment. Vitals:    03/09/22 0929   BP: 125/72   Site: Left Upper Arm   Position: Sitting   Cuff Size: Medium Adult   Pulse: 56   Temp: 97 °F (36.1 °C)   TempSrc: Temporal   SpO2: 98%   Weight: 147 lb (66.7 kg)   Height: 5' 8\" (1.727 m)       REVIEW OF SYSTEMS    Review of Systems   Constitutional: Positive for fatigue. Negative for fever and unexpected weight change. HENT: Negative. Eyes: Negative. Respiratory: Negative for cough and shortness of breath. Cardiovascular: Negative for chest pain and leg swelling. Gastrointestinal: Negative for abdominal pain, constipation and diarrhea. Endocrine: Negative. Genitourinary: Negative for frequency and urgency.    Musculoskeletal: Positive for arthralgias (  Right hip). Skin: Negative. Allergic/Immunologic: Negative. Neurological: Negative for dizziness and headaches. Imbalance   Hematological: Negative. Psychiatric/Behavioral: Negative for sleep disturbance. The patient is not nervous/anxious. PAST MEDICAL HISTORY    Past Medical History:   Diagnosis Date    Anxiety     Arthritis     RA    Asthmatic bronchitis, unspecified asthma severity, uncomplicated 97/72/6298    Atrial fibrillation (HCC)     not recurrent    Dyspnea on exertion 05/09/2018    GERD (gastroesophageal reflux disease)     Hyperlipidemia     diet controlled    Hypertension     Mitral valve disorder     Moderate to severe pulmonary hypertension ([address] 05/09/2018    Osteoarthritis     Palpitation     Physical deconditioning     Shortness of breath     with excertion work up is negative    Sleep apnea     not on cpap    Spinal stenosis at L4-L5 level     Stage 3b chronic kidney disease (HCC) 2020    Tachycardia     Tricuspid regurgitation     mild       FAMILY HISTORY    Family History   Problem Relation Age of Onset    High Blood Pressure Mother     Arthritis Mother     Diabetes Father     Arthritis Sister        SOCIAL HISTORY    Social History     Socioeconomic History    Marital status: Single     Spouse name: None    Number of children: None    Years of education: None    Highest education level: None   Occupational History    Occupation: dog    Tobacco Use    Smoking status: Never Smoker    Smokeless tobacco: Never Used   Substance and Sexual Activity    Alcohol use:  Yes     Alcohol/week: 5.0 standard drinks     Types: 5 Cans of beer per week     Comment: weekly    Drug use: No    Sexual activity: Never   Other Topics Concern    None   Social History Narrative    None     Social Determinants of Health     Financial Resource Strain: Low Risk     Difficulty of Paying Living Expenses: Not hard at all   Food Insecurity: No Food Insecurity    Worried About Running Out of Food in the Last Year: Never true    Ran Out of Food in the Last Year: Never true   Transportation Needs: No Transportation Needs    Lack of Transportation (Medical): No    Lack of Transportation (Non-Medical): No   Physical Activity:     Days of Exercise per Week: Not on file    Minutes of Exercise per Session: Not on file   Stress:     Feeling of Stress : Not on file   Social Connections:     Frequency of Communication with Friends and Family: Not on file    Frequency of Social Gatherings with Friends and Family: Not on file    Attends Voodoo Services: Not on file    Active Member of [address] [place] [address] [employer] or Organizations: Not on file    Attends Club or Organization Meetings: Not on file    Marital Status: Not on file   Intimate Partner Violence:     Fear of Current or Ex-Partner: Not on file    Emotionally Abused: Not on file    Physically Abused: Not on file    Sexually Abused: Not on file   Housing Stability:     Unable to Pay for Housing in the Last Year: Not on file    Number of [place] in the Last Year: Not on file    Unstable Housing in the Last Year: Not on file       SURGICAL HISTORY    Past Surgical History:   Procedure Laterality Date    CARDIAC CATHETERIZATION  05/19/2014   [address]  01/20/2016   [address]  11/01/2021    ablation for a-fib    COLONOSCOPY      ENDOSCOPY, COLON, DIAGNOSTIC      JOINT REPLACEMENT Right     total knee    JOINT REPLACEMENT Left     total knee    KNEE JOINT MANIPULATION Right     SPINE SURGERY  07/2017    lumbar fusion       CURRENT MEDICATIONS    Current Outpatient Medications   Medication Sig Dispense Refill    gabapentin (NEURONTIN) 100 MG capsule Take 1 capsule by mouth 3 times daily for 30 days.  90 capsule 0    XARELTO 20 MG TABS tablet Take 20 mg by mouth Daily with supper       Multiple Vitamins-Minerals (THERAPEUTIC MULTIVITAMIN-MINERALS) tablet Take 1 tablet by mouth daily      sotalol (BETAPACE) 80 MG tablet Take 80 mg by mouth 2 times daily Pt. Takes daily. States causes dizziness if she takes it twice a day      Handicap Placard MISC by Does not apply route  5 years from origninal written date 2022    Dx: Osteoarthritis unable to ambulate over 150ft 1 each 0    aspirin 81 MG tablet Take 81 mg by mouth daily. No current facility-administered medications for this visit. ALLERGIES    No Known Allergies    PHYSICAL EXAM   Physical Exam  Vitals reviewed. Constitutional:       Appearance: She is well-developed. HENT:      Head: Normocephalic. Eyes:      Pupils: Pupils are equal, round, and reactive to light. Neck:      Thyroid: No thyromegaly. Cardiovascular:      Rate and Rhythm: Normal rate and regular rhythm. Heart sounds: Normal heart sounds. No murmur heard. Pulmonary:      Effort: Pulmonary effort is normal.      Breath sounds: Normal breath sounds. No wheezing or rales. Abdominal:      Palpations: Abdomen is soft. Tenderness: There is no abdominal tenderness. There is no guarding or rebound. Comments: Fading rash left lateral lower abdomen   Musculoskeletal:         General: No tenderness or deformity. Normal range of motion. Cervical back: Normal range of motion and neck supple. Lymphadenopathy:      Cervical: No cervical adenopathy. Skin:     General: Skin is warm and dry. Neurological:      Mental Status: She is alert and oriented to person, place, and time. Psychiatric:         Mood and Affect: Mood normal.         Behavior: Behavior normal.         Thought Content: Thought content normal.         Judgment: Judgment normal.         ASSESSMENT/PLAN  1. Post herpetic neuralgia  Trial of 1 Neurontin daily increase to 3 times daily if tolerated. Suggested getting Lidoderm patches over-the-counter since insurance did not cover the prescription.  - gabapentin (NEURONTIN) 100 MG capsule;  Take 1 capsule by mouth 3 times daily for 30 days. Dispense: 90 capsule; Refill: 0    2. Abnormal glucose  Results for orders placed or performed in visit on 03/09/22   POCT glycosylated hemoglobin (Hb A1C)   Result Value Ref Range    Hemoglobin A1C 5.2 %       - POCT glycosylated hemoglobin (Hb A1C)    3. Moderate to severe pulmonary hypertension [facility])  Follow-up with cardiologist as planned. Chronic and stable    4. 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 Continue anticoagulant    5. Permanent atrial fibrillation (HCC)  Continue medications and follow with cardiologist.  No current symptoms    6. Stage 3a chronic kidney disease [facility])  Make appointment with nephrologist.    7. Balance disorder  She will discuss with her physical therapist if she is doing balance exercises. If not I will refer her to [address] received counseling on the following healthy behaviors: nutrition, exercise and medication adherence  Reviewed prior labs and health maintenance. Continue current medications, diet and exercise. Discussed use, benefit, and side effects of prescribed medications. Barriers to medication compliance addressed. Patient given educational materials - see patient instructions. All patient questions answered. Patient voiced understanding. Return in about 4 months (around 7/9/2022), or abnormal glucose, balance disorder.         Electronically signed by Feliciano Buchanan MD on 3/9/22 at 10:04 AM EST

## 2022-03-21 ENCOUNTER — TELEPHONE (OUTPATIENT)
Dept: FAMILY MEDICINE CLINIC | Age: 72
End: 2022-03-21

## 2022-03-21 DIAGNOSIS — N18.31 STAGE 3A CHRONIC KIDNEY DISEASE (HCC): Primary | ICD-10-CM

## 2022-04-12 ENCOUNTER — APPOINTMENT (OUTPATIENT)
Dept: GENERAL RADIOLOGY | Age: 72
DRG: 291 | End: 2022-04-12
Payer: MEDICARE

## 2022-04-12 ENCOUNTER — HOSPITAL ENCOUNTER (INPATIENT)
Age: 72
LOS: 1 days | Discharge: HOME | DRG: 291 | End: 2022-04-13
Attending: EMERGENCY MEDICINE | Admitting: INTERNAL MEDICINE
Payer: MEDICARE

## 2022-04-12 DIAGNOSIS — I50.43 ACUTE ON CHRONIC COMBINED SYSTOLIC AND DIASTOLIC CHF (CONGESTIVE HEART FAILURE) (HCC): ICD-10-CM

## 2022-04-12 DIAGNOSIS — J81.0 ACUTE PULMONARY EDEMA (HCC): Primary | ICD-10-CM

## 2022-04-12 PROBLEM — I50.33 ACUTE ON CHRONIC DIASTOLIC (CONGESTIVE) HEART FAILURE (HCC): Status: ACTIVE | Noted: 2022-04-12

## 2022-04-12 PROBLEM — I50.9 DECOMPENSATED HEART FAILURE (HCC): Status: RESOLVED | Noted: 2022-04-12 | Resolved: 2022-04-12

## 2022-04-12 PROBLEM — I50.9 DECOMPENSATED HEART FAILURE (HCC): Status: ACTIVE | Noted: 2022-04-12

## 2022-04-12 LAB
ABSOLUTE EOS #: 0.06 K/UL (ref 0–0.4)
ABSOLUTE IMMATURE GRANULOCYTE: 0 K/UL (ref 0–0.3)
ABSOLUTE LYMPH #: 0.52 K/UL (ref 1–4.8)
ABSOLUTE MONO #: 0.46 K/UL (ref 0.2–0.8)
ANION GAP SERPL CALCULATED.3IONS-SCNC: 11 MMOL/L (ref 9–17)
BASOPHILS # BLD: 1 %
BASOPHILS ABSOLUTE: 0.06 K/UL (ref 0–0.2)
BUN BLDV-MCNC: 15 MG/DL (ref 8–23)
BUN/CREAT BLD: 16 (ref 9–20)
CALCIUM SERPL-MCNC: 9.2 MG/DL (ref 8.6–10.4)
CHLORIDE BLD-SCNC: 106 MMOL/L (ref 98–107)
CO2: 23 MMOL/L (ref 20–31)
CREAT SERPL-MCNC: 0.94 MG/DL (ref 0.5–0.9)
D-DIMER QUANTITATIVE: 0.81 MG/L FEU (ref 0–0.59)
EOSINOPHILS RELATIVE PERCENT: 1 % (ref 1–4)
GFR AFRICAN AMERICAN: >60 ML/MIN
GFR NON-AFRICAN AMERICAN: 58 ML/MIN
GFR SERPL CREATININE-BSD FRML MDRD: ABNORMAL ML/MIN/{1.73_M2}
GLUCOSE BLD-MCNC: 105 MG/DL (ref 70–99)
HCT VFR BLD CALC: 34.2 % (ref 36.3–47.1)
HEMOGLOBIN: 10.5 G/DL (ref 11.9–15.1)
IMMATURE GRANULOCYTES: 0 %
IRON SATURATION: 8 % (ref 20–55)
IRON: 21 UG/DL (ref 37–145)
LYMPHOCYTES # BLD: 9 % (ref 24–44)
MCH RBC QN AUTO: 28.5 PG (ref 25.2–33.5)
MCHC RBC AUTO-ENTMCNC: 30.7 G/DL (ref 28.4–34.8)
MCV RBC AUTO: 92.7 FL (ref 82.6–102.9)
MONOCYTES # BLD: 8 % (ref 1–7)
MYOGLOBIN: 66 NG/ML (ref 25–58)
MYOGLOBIN: 75 NG/ML (ref 25–58)
NRBC AUTOMATED: 0 PER 100 WBC
PDW BLD-RTO: 16 % (ref 11.8–14.4)
PLATELET # BLD: 170 K/UL (ref 138–453)
PMV BLD AUTO: 10 FL (ref 8.1–13.5)
POTASSIUM SERPL-SCNC: 3.8 MMOL/L (ref 3.7–5.3)
PRO-BNP: 2870 PG/ML
RBC # BLD: 3.69 M/UL (ref 3.95–5.11)
SEG NEUTROPHILS: 81 % (ref 36–66)
SEGMENTED NEUTROPHILS ABSOLUTE COUNT: 4.7 K/UL (ref 1.8–7.7)
SODIUM BLD-SCNC: 140 MMOL/L (ref 135–144)
TOTAL IRON BINDING CAPACITY: 249 UG/DL (ref 250–450)
TROPONIN, HIGH SENSITIVITY: 24 NG/L (ref 0–14)
TROPONIN, HIGH SENSITIVITY: 25 NG/L (ref 0–14)
TROPONIN, HIGH SENSITIVITY: 26 NG/L (ref 0–14)
UNSATURATED IRON BINDING CAPACITY: 228 UG/DL (ref 112–347)
WBC # BLD: 5.8 K/UL (ref 3.5–11.3)

## 2022-04-12 PROCEDURE — 2060000000 HC ICU INTERMEDIATE R&B

## 2022-04-12 PROCEDURE — 71045 X-RAY EXAM CHEST 1 VIEW: CPT

## 2022-04-12 PROCEDURE — 2580000003 HC RX 258: Performed by: INTERNAL MEDICINE

## 2022-04-12 PROCEDURE — 99222 1ST HOSP IP/OBS MODERATE 55: CPT | Performed by: INTERNAL MEDICINE

## 2022-04-12 PROCEDURE — 93005 ELECTROCARDIOGRAM TRACING: CPT | Performed by: EMERGENCY MEDICINE

## 2022-04-12 PROCEDURE — 85379 FIBRIN DEGRADATION QUANT: CPT

## 2022-04-12 PROCEDURE — 6370000000 HC RX 637 (ALT 250 FOR IP): Performed by: INTERNAL MEDICINE

## 2022-04-12 PROCEDURE — 80048 BASIC METABOLIC PNL TOTAL CA: CPT

## 2022-04-12 PROCEDURE — 84484 ASSAY OF TROPONIN QUANT: CPT

## 2022-04-12 PROCEDURE — 83880 ASSAY OF NATRIURETIC PEPTIDE: CPT

## 2022-04-12 PROCEDURE — 83540 ASSAY OF IRON: CPT

## 2022-04-12 PROCEDURE — 36415 COLL VENOUS BLD VENIPUNCTURE: CPT

## 2022-04-12 PROCEDURE — 85025 COMPLETE CBC W/AUTO DIFF WBC: CPT

## 2022-04-12 PROCEDURE — 99283 EMERGENCY DEPT VISIT LOW MDM: CPT

## 2022-04-12 PROCEDURE — 83550 IRON BINDING TEST: CPT

## 2022-04-12 PROCEDURE — 6360000002 HC RX W HCPCS: Performed by: EMERGENCY MEDICINE

## 2022-04-12 PROCEDURE — 83874 ASSAY OF MYOGLOBIN: CPT

## 2022-04-12 RX ORDER — SODIUM CHLORIDE 0.9 % (FLUSH) 0.9 %
10 SYRINGE (ML) INJECTION PRN
Status: DISCONTINUED | OUTPATIENT
Start: 2022-04-12 | End: 2022-04-13 | Stop reason: HOSPADM

## 2022-04-12 RX ORDER — POLYETHYLENE GLYCOL 3350 17 G/17G
17 POWDER, FOR SOLUTION ORAL DAILY PRN
Status: DISCONTINUED | OUTPATIENT
Start: 2022-04-12 | End: 2022-04-13 | Stop reason: HOSPADM

## 2022-04-12 RX ORDER — SODIUM CHLORIDE 0.9 % (FLUSH) 0.9 %
5-40 SYRINGE (ML) INJECTION EVERY 12 HOURS SCHEDULED
Status: DISCONTINUED | OUTPATIENT
Start: 2022-04-12 | End: 2022-04-13 | Stop reason: HOSPADM

## 2022-04-12 RX ORDER — FUROSEMIDE 10 MG/ML
20 INJECTION INTRAMUSCULAR; INTRAVENOUS 2 TIMES DAILY
Status: DISCONTINUED | OUTPATIENT
Start: 2022-04-13 | End: 2022-04-13

## 2022-04-12 RX ORDER — SOTALOL HYDROCHLORIDE 80 MG/1
80 TABLET ORAL 2 TIMES DAILY
Status: DISCONTINUED | OUTPATIENT
Start: 2022-04-13 | End: 2022-04-12

## 2022-04-12 RX ORDER — ACETAMINOPHEN 325 MG/1
650 TABLET ORAL EVERY 6 HOURS PRN
Status: DISCONTINUED | OUTPATIENT
Start: 2022-04-12 | End: 2022-04-13 | Stop reason: HOSPADM

## 2022-04-12 RX ORDER — ASPIRIN 81 MG/1
81 TABLET ORAL DAILY
Status: DISCONTINUED | OUTPATIENT
Start: 2022-04-13 | End: 2022-04-13 | Stop reason: HOSPADM

## 2022-04-12 RX ORDER — M-VIT,TX,IRON,MINS/CALC/FOLIC 27MG-0.4MG
1 TABLET ORAL DAILY
Status: DISCONTINUED | OUTPATIENT
Start: 2022-04-13 | End: 2022-04-13 | Stop reason: HOSPADM

## 2022-04-12 RX ORDER — SODIUM CHLORIDE 9 MG/ML
INJECTION, SOLUTION INTRAVENOUS PRN
Status: DISCONTINUED | OUTPATIENT
Start: 2022-04-12 | End: 2022-04-13 | Stop reason: HOSPADM

## 2022-04-12 RX ORDER — LISINOPRIL 2.5 MG/1
2.5 TABLET ORAL DAILY
Status: DISCONTINUED | OUTPATIENT
Start: 2022-04-13 | End: 2022-04-13 | Stop reason: HOSPADM

## 2022-04-12 RX ORDER — GABAPENTIN 100 MG/1
100 CAPSULE ORAL 3 TIMES DAILY
Status: DISCONTINUED | OUTPATIENT
Start: 2022-04-12 | End: 2022-04-13 | Stop reason: HOSPADM

## 2022-04-12 RX ORDER — ACETAMINOPHEN 650 MG/1
650 SUPPOSITORY RECTAL EVERY 6 HOURS PRN
Status: DISCONTINUED | OUTPATIENT
Start: 2022-04-12 | End: 2022-04-13 | Stop reason: HOSPADM

## 2022-04-12 RX ORDER — FUROSEMIDE 10 MG/ML
40 INJECTION INTRAMUSCULAR; INTRAVENOUS ONCE
Status: COMPLETED | OUTPATIENT
Start: 2022-04-12 | End: 2022-04-12

## 2022-04-12 RX ADMIN — RIVAROXABAN 20 MG: 20 TABLET, FILM COATED ORAL at 19:44

## 2022-04-12 RX ADMIN — ACETAMINOPHEN 650 MG: 325 TABLET ORAL at 19:45

## 2022-04-12 RX ADMIN — SODIUM CHLORIDE, PRESERVATIVE FREE 10 ML: 5 INJECTION INTRAVENOUS at 22:04

## 2022-04-12 RX ADMIN — FUROSEMIDE 40 MG: 10 INJECTION, SOLUTION INTRAMUSCULAR; INTRAVENOUS at 17:54

## 2022-04-12 ASSESSMENT — ENCOUNTER SYMPTOMS
COLOR CHANGE: 0
COUGH: 0
ABDOMINAL PAIN: 0
EYE REDNESS: 0
DIARRHEA: 0
CONSTIPATION: 0
VOMITING: 0
EYE DISCHARGE: 0
FACIAL SWELLING: 0
SHORTNESS OF BREATH: 1

## 2022-04-12 ASSESSMENT — PAIN SCALES - GENERAL
PAINLEVEL_OUTOF10: 7
PAINLEVEL_OUTOF10: 0

## 2022-04-12 NOTE — ED NOTES
Patient is brought in by friend from the mall and presents with SOB with exertion. Patient states that she was at the mall and was walking up stairs when she started having difficulty breathing. Patient is a/o x4 and walks to bed. Patient does not appear to has SOB at rest.  Patient does not appear to be in distress.      Connor Mixon RN  04/12/22 2101

## 2022-04-12 NOTE — ED PROVIDER NOTES
[address] [place] [address] ED  EMERGENCY DEPARTMENT ENCOUNTER      Pt Name: Belkis Tejada  MRN: [id number]  [place] 1950  Date of evaluation: 2022  Provider: Jason Butts MD    CHIEF COMPLAINT       Chief Complaint   Patient presents with    Shortness of Breath     started during the night; pt reports she woke up to use the restroom and started wheezing with ANDERSON; denies hx of COPD; denies hx of asthma; denies CHF; hx of afib with ablation         HISTORY OF PRESENT ILLNESS  (Location/Symptom, Timing/Onset, Context/Setting, Quality, Duration, Modifying Factors, Severity.)   Belkis Tejada is a 67 y.o. female who presents to the emergency department shortness of breath. It began about 5:00 today. It was worse with exertion when she was climbing some stairs. She has a history of atrial fibrillation and several months ago had cardiac ablation. She still on Xarelto. She has not had a fever but she has had a very slight cough. She took a home Covid test that she states was negative. No history of asthma or COPD or CHF. Nursing Notes were reviewed. ALLERGIES     Patient has no known allergies. CURRENT MEDICATIONS       Previous Medications    ASPIRIN 81 MG TABLET    Take 81 mg by mouth daily. GABAPENTIN (NEURONTIN) 100 MG CAPSULE    Take 1 capsule by mouth 3 times daily for 30 days. HANDICAP PLACARD MISC    by Does not apply route  5 years from origninal written date 2022    Dx: Osteoarthritis unable to ambulate over 150ft    MULTIPLE VITAMINS-MINERALS (THERAPEUTIC MULTIVITAMIN-MINERALS) TABLET    Take 1 tablet by mouth daily    SOTALOL (BETAPACE) 80 MG TABLET    Take 80 mg by mouth 2 times daily Pt. Takes daily.   States causes dizziness if she takes it twice a day    XARELTO 20 MG TABS TABLET    Take 20 mg by mouth Daily with supper        PAST MEDICAL HISTORY         Diagnosis Date    Anxiety     Arthritis     RA    Asthmatic bronchitis, unspecified asthma severity, uncomplicated     Atrial fibrillation (HCC)     not recurrent    Dyspnea on exertion 2018    GERD (gastroesophageal reflux disease)     Hyperlipidemia     diet controlled    Hypertension     Mitral valve disorder     Moderate to severe pulmonary hypertension ([address] 2018    Osteoarthritis     Palpitation     Physical deconditioning     Shortness of breath     with excertion work up is negative    Sleep apnea     not on cpap    Spinal stenosis at L4-L5 level     Stage 3b chronic kidney disease ([facility]     Tachycardia     Tricuspid regurgitation     mild       SURGICAL HISTORY           Procedure Laterality Date    CARDIAC CATHETERIZATION  2014    CARDIAC CATHETERIZATION  2016    CARDIAC SURGERY  2021    ablation for a-fib    COLONOSCOPY      ENDOSCOPY, COLON, DIAGNOSTIC      JOINT REPLACEMENT Right     total knee    JOINT REPLACEMENT Left     total knee    KNEE JOINT MANIPULATION Right     SPINE SURGERY  2017    lumbar fusion         FAMILY HISTORY           Problem Relation Age of Onset    High Blood Pressure Mother     Arthritis Mother     Diabetes Father     Arthritis Sister      Family Status   Relation Name Status    Mother      Father      Sister  Alive    Brother  Alive    Brother  [address]      reports that she has never smoked. She has never used smokeless tobacco. She reports current alcohol use of about 5.0 standard drinks of alcohol per week. She reports that she does not use drugs. REVIEW OF SYSTEMS    (2-9 systems for level 4, 10 or more for level 5)     Review of Systems   Constitutional: Negative for chills, fatigue and fever. HENT: Negative for congestion, ear discharge and facial swelling. Eyes: Negative for discharge and redness. Respiratory: Positive for shortness of breath. Negative for cough. Cardiovascular: Negative for chest pain.    Gastrointestinal: Negative for abdominal pain, constipation, diarrhea and vomiting. Genitourinary: Negative for dysuria and hematuria. Musculoskeletal: Negative for arthralgias. Skin: Negative for color change and rash. Neurological: Negative for syncope, numbness and headaches. Hematological: Negative for adenopathy. Psychiatric/Behavioral: Negative for confusion. The patient is not nervous/anxious. Except as noted above the remainder of the review of systems was reviewed and negative. PHYSICAL EXAM    (up to 7 for level 4, 8 or more for level 5)     Vitals:    04/12/22 1505   BP: (!) 191/104   Pulse: 88   Resp: 18   Temp: 98 °F (36.7 °C)   TempSrc: Oral   SpO2: 99%   Weight: 150 lb (68 kg)   Height: 5' 8\" (1.727 m)       Physical Exam  Vitals reviewed. Constitutional:       General: She is not in acute distress. Appearance: She is well-developed. She is not diaphoretic. HENT:      Head: Normocephalic and atraumatic. Eyes:      General: No scleral icterus. Right eye: No discharge. Left eye: No discharge. Cardiovascular:      Rate and Rhythm: Normal rate and regular rhythm. Comments: Heart is generally regular with occasional early beat. Pulmonary:      Effort: Pulmonary effort is normal. No respiratory distress. Breath sounds: Normal breath sounds. No stridor. No wheezing or rales. Comments: She is speaking in full sentences. Abdominal:      General: There is no distension. Palpations: Abdomen is soft. Tenderness: There is no abdominal tenderness. Musculoskeletal:         General: Normal range of motion. Cervical back: Neck supple. Right lower leg: Edema present. Left lower leg: Edema present. Lymphadenopathy:      Cervical: No cervical adenopathy. Skin:     General: Skin is warm and dry. Findings: No erythema or rash. Neurological:      Mental Status: She is alert and oriented to person, place, and time.    Psychiatric:         Behavior: Behavior normal.             DIAGNOSTIC RESULTS     EKG: All EKG's are interpreted by the Emergency Department Physician who either signs or Co-signs this chart in the absence of a cardiologist.    EKG my interpretation shows sinus rhythm with PACs    RADIOLOGY:   Non-plain film images such as CT, Ultrasound and MRI are read by the radiologist. Plain radiographic images are visualized and preliminarily interpreted by the emergency physician with the below findings:    Interpretation per the Radiologist below, if available at the time of this note:    XR CHEST PORTABLE    Result Date: 4/12/2022  EXAMINATION: ONE XRAY VIEW OF THE CHEST 4/12/2022 4:02 pm COMPARISON: None. HISTORY: ORDERING SYSTEM PROVIDED HISTORY: Shortness of breath TECHNOLOGIST PROVIDED HISTORY: Shortness of breath Reason for Exam: sob FINDINGS: Mild cardiomegaly. Bilateral interstitial opacities. No pneumothorax. Probable trace bilateral pleural effusions. Mild cardiomegaly with interstitial pulmonary edema. Trace bilateral pleural effusions.        LABS:  Labs Reviewed   CBC WITH AUTO DIFFERENTIAL - Abnormal; Notable for the following components:       Result Value    RBC 3.69 (*)     Hemoglobin 10.5 (*)     Hematocrit 34.2 (*)     RDW 16.0 (*)     Seg Neutrophils 81 (*)     Lymphocytes 9 (*)     Monocytes 8 (*)     Absolute Lymph # 0.52 (*)     All other components within normal limits   BRAIN NATRIURETIC PEPTIDE - Abnormal; Notable for the following components:    Pro-BNP 2,870 (*)     All other components within normal limits   BASIC METABOLIC PANEL - Abnormal; Notable for the following components:    Glucose 105 (*)     CREATININE 0.94 (*)     GFR Non- 58 (*)     All other components within normal limits   TROP/MYOGLOBIN - Abnormal; Notable for the following components:    Troponin, High Sensitivity 24 (*)     Myoglobin 75 (*)     All other components within normal limits   D-DIMER, QUANTITATIVE - Abnormal; Notable for the following components:    D-Dimer, Quant 0.81 (*)     All other components within normal limits   TROP/MYOGLOBIN       All other labs were within normal range or not returned as of this dictation. EMERGENCY DEPARTMENT COURSE and DIFFERENTIAL DIAGNOSIS/MDM:   Vitals:    Vitals:    04/12/22 1505   BP: (!) 191/104   Pulse: 88   Resp: 18   Temp: 98 °F (36.7 °C)   TempSrc: Oral   SpO2: 99%   Weight: 150 lb (68 kg)   Height: 5' 8\" (1.727 m)       Orders Placed This Encounter   Medications    furosemide (LASIX) injection 40 mg       Medical Decision Making: Pulmonary edema is identified on the x-ray. BNP is elevated. She had been on Lasix until about 5 months ago when she had her cardiac ablation. She is given IV Lasix and is being admitted. Treatment diagnosis and disposition were discussed with the patient. CRITICAL CARE TIME     Due to the high probability of sudden and clinically significant deterioration in the patient's condition she required highest level of my preparedness to intervene urgently. I provided critical care time including documentation time, medication orders and management, reevaluation, vital sign assessment, ordering and reviewing of of lab tests ordering and reviewing of x-ray studies, and admission orders. Aggregate critical care time is 35 minutes including only time during which I was engaged in work directly related to her care and did not include time spent treating other patients simultaneously. CONSULTS:  IP CONSULT TO HOSPITALIST    PROCEDURES:  None    FINAL IMPRESSION      1. Acute pulmonary edema [facility])          DISPOSITION/PLAN   DISPOSITION Decision To Admit 04/12/2022 05:14:45 PM      PATIENT REFERRED TO:   No follow-up provider specified. DISCHARGE MEDICATIONS:     New Prescriptions    No medications on file       The care is provided during an unprecedented national emergency due to the novel coronavirus, COVID-19.     (Please note that portions of this note were completed with a voice recognition program.  Efforts were made to edit the dictations but occasionally words are mis-transcribed.)    Yaniv Chau MD  Attending Emergency Physician            Yaniv Chau MD  04/12/22 7293

## 2022-04-12 NOTE — H&P
[facility]  Office: [address], DO, Zeynep Dyer, DO, Veda Jaffe, DO, John Calvillo, DO, Nehemias Orozco MD, Evert Rhoades MD, Holly Stevens MD, Federico Tenorio MD, Juan Thorpe MD, Sharad Conner MD, Zoe Calles MD, Jimena Rivas, DO, Reid Wheeler, DO, Georges Chawla MD,  Tosha Boykin, DO, Emre Romero MD, Ruth Cespedes MD, Alexis Godfrey MD, Stephen Sequeira DO, Es Cano MD, Hay Tee MD, Mario Hines, [facility], [facility] Jacki, CNP, Felicia Johnson, CNP, Jenny Alcantara, CNP, Jimmy Nuno, [facility], Julius Whitt, CNP, Itzel Wise [facility], Karie Zhao, [facility], Ting Aparicio, CNP, Jacklyn Frankel, CNP, Lalit Valdez, [facility], Jami Mac, [facility], Hugo Phoenix, [facility], Kvng Bentley, CNP, Marie Bennett, CNP, Cj Purdy, [facility]    HISTORY AND PHYSICAL EXAMINATION            Date:   4/12/2022  Patient name:  Zenia Valerio  Date of admission:  4/12/2022  3:37 PM  MRN:   [id number]  Account:  [de-identified]  YOB: 1950  PCP:    Letitia Momin  Room:   [place] [address]  Code Status:    Full    Chief Complaint:     Chief Complaint   Patient presents with    Shortness of Breath     started during the night; pt reports she woke up to use the restroom and started wheezing with ANDERSON; denies hx of COPD; denies hx of asthma; denies CHF; hx of afib with ablation     History Obtained From:     patient    History of Present Illness: The patient comes to the emergency room today with complaints of shortness of breath. She states that she experienced shortness of breath, wheezing and had a \"squeezing noise\" noted in her neck which all started at 0500 this morning upon waking up. The patient's symptoms initially subsided after she rested for a few minutes. Later in the day she went to the movies which required her to walk up stairs and down a long dietz, and her symptoms returned.  She was unable to catch her breath after sitting and decided to be evaluated at the emergency room. She states that she has a past medical history for A-fib (which she underwent an ablation for) and arthritis. Patient follows with Dr. Amber Gilliland who monitors a \"leaky valve\" through an ECHO annually. The patient states that they recently took her off of her lasix and that she doesn't know why. She denies any breathing issues in the past, fever, chills, chest pain, edema, nausea, vomiting or diarrhea. Reports she had a similar episode a few weeks ago where she had lower extremity edema but no shortness of breath previously prescribed Lasix with resolution. Today she had increasing shortness of breath with activity and presented the emergency room. Has been found to have exacerbation of congestive heart failure. Pro- BNP 2870  Trop 24  CXR:   Mild cardiomegaly with interstitial pulmonary edema.  Trace bilateral pleural   effusions.      Past Medical History:     Past Medical History:   Diagnosis Date    Anxiety     Arthritis     RA    Asthmatic bronchitis, unspecified asthma severity, uncomplicated 03/08/7973    Atrial fibrillation (HCC)     not recurrent    Dyspnea on exertion 05/09/2018    GERD (gastroesophageal reflux disease)     Hyperlipidemia     diet controlled    Hypertension     Mitral valve disorder     Moderate to severe pulmonary hypertension ([address] 05/09/2018    Osteoarthritis     Palpitation     Physical deconditioning     Shortness of breath     with excertion work up is negative    Sleep apnea     not on cpap    Spinal stenosis at L4-L5 level     Stage 3b chronic kidney disease ([address] 2020    Tachycardia     Tricuspid regurgitation     mild        Past Surgical History:     Past Surgical History:   Procedure Laterality Date    CARDIAC CATHETERIZATION  05/19/2014    CARDIAC CATHETERIZATION  01/20/2016    CARDIAC SURGERY  11/01/2021    ablation for a-fib    COLONOSCOPY      ENDOSCOPY, COLON, DIAGNOSTIC      JOINT REPLACEMENT Right     total knee    JOINT REPLACEMENT Left     total knee    KNEE JOINT MANIPULATION Right     SPINE SURGERY  2017    lumbar fusion        Medications Prior to Admission:     Prior to Admission medications    Medication Sig Start Date End Date Taking? Authorizing Provider   gabapentin (NEURONTIN) 100 MG capsule Take 1 capsule by mouth 3 times daily for 30 days. 3/9/22 4/8/22  Socorro Yang MD   XARELTO 20 MG TABS tablet Take 20 mg by mouth Daily with supper  21   Historical Provider, MD   Multiple Vitamins-Minerals (THERAPEUTIC MULTIVITAMIN-MINERALS) tablet Take 1 tablet by mouth daily    Historical Provider, MD   sotalol (BETAPACE) 80 MG tablet Take 80 mg by mouth 2 times daily Pt. Takes daily. States causes dizziness if she takes it twice a day    Historical Provider, MD   Handicap Placard MISC by Does not apply route  5 years from origninal written date 2022    Dx: Osteoarthritis unable to ambulate over 150ft 17   Socorro Yang MD   aspirin 81 MG tablet Take 81 mg by mouth daily. Historical Provider, MD        Allergies:     Patient has no known allergies. Social History:     Tobacco:    reports that she has never smoked. She has never used smokeless tobacco.  Alcohol:      reports current alcohol use of about 5.0 standard drinks of alcohol per week. Drug Use:  reports no history of drug use. Family History:     Family History   Problem Relation Age of Onset    High Blood Pressure Mother     Arthritis Mother     Diabetes Father     Arthritis Sister        Review of Systems:     Positive and Negative as described in HPI. CONSTITUTIONAL:  negative for fevers, chills, sweats, fatigue, weight loss  HEENT:  negative for vision, hearing changes, runny nose, throat pain  RESPIRATORY: positive for shortness of breath, wheezing.  Negative for cough, congestion  CARDIOVASCULAR:  negative for chest pain, positive for palpitations  GASTROINTESTINAL: negative for nausea, vomiting, diarrhea, constipation, change in bowel habits, abdominal pain   GENITOURINARY:  negative for difficulty of urination, burning with urination, frequency   INTEGUMENT:  negative for rash, skin lesions, easy bruising   HEMATOLOGIC/LYMPHATIC:  negative for swelling/edema   ALLERGIC/IMMUNOLOGIC:  negative for urticaria , itching  ENDOCRINE:  negative increase in drinking, increase in urination, hot or cold intolerance  MUSCULOSKELETAL:  negative joint pains, muscle aches, swelling of joints, positive for lower back pain- chronic  NEUROLOGICAL:  negative for headaches, dizziness, lightheadedness, numbness, pain, tingling extremities  BEHAVIOR/PSYCH:  negative for depression, anxiety    Physical Exam:   BP (!) 161/97   Pulse 89   Temp 98 °F (36.7 °C) (Oral)   Resp 30   Ht 5' 8\" (1.727 m)   Wt 150 lb (68 kg)   SpO2 94%   BMI 22.81 kg/m²   Temp (24hrs), Av °F (36.7 °C), Min:98 °F (36.7 °C), Max:98 °F (36.7 °C)    No results for input(s): POCGLU in the last 72 hours. No intake or output data in the 24 hours ending 22 1855    General Appearance:  alert, well appearing, and in no acute distress  Mental status: oriented to person, place, and time  Head:  normocephalic, atraumatic  Eye: no icterus, redness, pupils equal and reactive, extraocular eye movements intact, conjunctiva clear  Ear: normal external ear, no discharge, hearing intact  Nose:  no drainage noted  Mouth: mucous membranes moist  Neck: supple, no carotid bruits, thyroid not palpable  Lungs: Bilateral equal air entry, clear to auscultation, no wheezing, or rhonchi, normal effort. Rales noted LLL  Cardiovascular: normal rate, regular rhythm with APBs, no murmur, gallop, rub.   Abdomen: Soft, nontender, nondistended, normal bowel sounds, no hepatomegaly or splenomegaly  Neurologic: There are no new focal motor or sensory deficits, normal muscle tone and bulk, no abnormal sensation, normal speech, cranial nerves II through XII grossly intact, increased thoracic kyphosis  Skin: No gross lesions, rashes, bruising or bleeding on exposed skin area  Extremities:  peripheral pulses palpable, no pedal edema or calf pain with palpation  Psych: normal affect     Investigations:      Laboratory Testing:  Recent Results (from the past 24 hour(s))   EKG 12 Lead    Collection Time: 04/12/22  3:07 PM   Result Value Ref Range    Ventricular Rate 88 BPM    Atrial Rate 88 BPM    P-R Interval 144 ms    QRS Duration 88 ms    Q-T Interval 394 ms    QTc Calculation (Bazett) 476 ms    P Axis 55 degrees    R Axis 22 degrees    T Axis 69 degrees   CBC with Auto Differential    Collection Time: 04/12/22  4:08 PM   Result Value Ref Range    WBC 5.8 3.5 - 11.3 k/uL    RBC 3.69 (L) 3.95 - 5.11 m/uL    Hemoglobin 10.5 (L) 11.9 - 15.1 g/dL    Hematocrit 34.2 (L) 36.3 - 47.1 %    MCV 92.7 82.6 - 102.9 fL    MCH 28.5 25.2 - 33.5 pg    MCHC 30.7 28.4 - 34.8 g/dL    RDW 16.0 (H) 11.8 - 14.4 %    Platelets 197 985 - 504 k/uL    MPV 10.0 8.1 - 13.5 fL    NRBC Automated 0.0 0.0 per 100 WBC    Seg Neutrophils 81 (H) 36 - 66 %    Lymphocytes 9 (L) 24 - 44 %    Monocytes 8 (H) 1 - 7 %    Eosinophils % 1 1 - 4 %    Basophils 1 %    Immature Granulocytes 0 0 %    Segs Absolute 4.70 1.8 - 7.7 k/uL    Absolute Lymph # 0.52 (L) 1.0 - 4.8 k/uL    Absolute Mono # 0.46 0.2 - 0.8 k/uL    Absolute Eos # 0.06 0.0 - 0.4 k/uL    Basophils Absolute 0.06 0.0 - 0.2 k/uL    Absolute Immature Granulocyte 0.00 0.00 - 0.30 k/uL   Brain Natriuretic Peptide    Collection Time: 04/12/22  4:08 PM   Result Value Ref Range    Pro-BNP 2,870 (H) <300 pg/mL   Basic Metabolic Panel    Collection Time: 04/12/22  4:08 PM   Result Value Ref Range    Glucose 105 (H) 70 - 99 mg/dL    BUN 15 8 - 23 mg/dL    CREATININE 0.94 (H) 0.50 - 0.90 mg/dL    Bun/Cre Ratio 16 9 - 20    Calcium 9.2 8.6 - 10.4 mg/dL    Sodium 140 135 - 144 mmol/L    Potassium 3.8 3.7 - 5.3 mmol/L    Chloride 106 98 - 107 mmol/L CO2 23 20 - 31 mmol/L    Anion Gap 11 9 - 17 mmol/L    GFR Non-African American 58 (L) >60 mL/min    GFR African American >60 >60 mL/min    GFR Comment         TROP/MYOGLOBIN    Collection Time: 04/12/22  4:08 PM   Result Value Ref Range    Troponin, High Sensitivity 24 (H) 0 - 14 ng/L    Myoglobin 75 (H) 25 - 58 ng/mL   D-Dimer, Quantitative    Collection Time: 04/12/22  4:08 PM   Result Value Ref Range    D-Dimer, Quant 0.81 (H) 0.00 - 0.59 mg/L FEU   TROP/MYOGLOBIN    Collection Time: 04/12/22  6:20 PM   Result Value Ref Range    Troponin, High Sensitivity 25 (H) 0 - 14 ng/L    Myoglobin 66 (H) 25 - 58 ng/mL       Imaging/Diagnostics:  XR CHEST PORTABLE    Result Date: 4/12/2022  Mild cardiomegaly with interstitial pulmonary edema. Trace bilateral pleural effusions. Assessment :      Hospital Problems           Last Modified POA    * (Principal) Decompensated heart failure ([address] 4/12/2022 Yes    Paroxysmal A-fib ([address] 4/12/2022 Yes    Primary osteoarthritis involving multiple joints 4/12/2022 Yes    Essential hypertension 4/12/2022 Yes          Plan:     Patient status inpatient in the Progressive Unit/Step down    1. Decompensated Heart Failure  1. Start Lasix  2. Obtain ECHO, evaluate for EF. On prior MODESTO attempted ablation ejection fraction is reduced but poor quality study. If she has systolic component to her heart failure will benefit from stress test.  3. Low-dose ACE inhibitor  4. Monitor I&Os  5. Monitor AM labs, supplement electrolytes as needed  6. Obtain CXR  2. Paroxsymal A-Fib  1. Restart xarelto, remain off sotalol, previously discontinued after her ablation  3. Osteoarthritis  1. Restart home medications- uses tylenol  4. Essential hypertension  1. Restarting home medications  5. Start Diet  6. PT/OT  7.  Discussed plan with patient and staff    Consultations:   IP CONSULT TO HOSPITALIST  IP CONSULT TO HEART FAILURE NURSE/COORDINATOR  IP CONSULT TO DIETITIAN     Patient is admitted as inpatient status because of co-morbidities listed above, severity of signs and symptoms as outlined, requirement for current medical therapies and most importantly because of direct risk to patient if care not provided in a hospital setting. Expected length of stay > 48 hours.     Za Oseguera DO  4/12/2022  6:55 PM    Copy sent to Dr. Eleni Alfaro

## 2022-04-13 VITALS
RESPIRATION RATE: 16 BRPM | TEMPERATURE: 98.4 F | DIASTOLIC BLOOD PRESSURE: 71 MMHG | OXYGEN SATURATION: 97 % | HEART RATE: 78 BPM | HEIGHT: 68 IN | WEIGHT: 137.06 LBS | BODY MASS INDEX: 20.77 KG/M2 | SYSTOLIC BLOOD PRESSURE: 122 MMHG

## 2022-04-13 PROBLEM — I50.43 ACUTE ON CHRONIC COMBINED SYSTOLIC AND DIASTOLIC CHF (CONGESTIVE HEART FAILURE) (HCC): Status: ACTIVE | Noted: 2022-04-12

## 2022-04-13 LAB
ABSOLUTE EOS #: 0.09 K/UL (ref 0–0.44)
ABSOLUTE IMMATURE GRANULOCYTE: 0 K/UL (ref 0–0.3)
ABSOLUTE LYMPH #: 0.47 K/UL (ref 1.1–3.7)
ABSOLUTE MONO #: 0.47 K/UL (ref 0.1–1.2)
ANION GAP SERPL CALCULATED.3IONS-SCNC: 11 MMOL/L (ref 9–17)
BASOPHILS # BLD: 0 % (ref 0–2)
BASOPHILS ABSOLUTE: 0 K/UL (ref 0–0.2)
BUN BLDV-MCNC: 14 MG/DL (ref 8–23)
BUN/CREAT BLD: 16 (ref 9–20)
CALCIUM SERPL-MCNC: 9.2 MG/DL (ref 8.6–10.4)
CHLORIDE BLD-SCNC: 102 MMOL/L (ref 98–107)
CO2: 26 MMOL/L (ref 20–31)
CREAT SERPL-MCNC: 0.88 MG/DL (ref 0.5–0.9)
EKG ATRIAL RATE: 88 BPM
EKG P AXIS: 55 DEGREES
EKG P-R INTERVAL: 144 MS
EKG Q-T INTERVAL: 394 MS
EKG QRS DURATION: 88 MS
EKG QTC CALCULATION (BAZETT): 476 MS
EKG R AXIS: 22 DEGREES
EKG T AXIS: 69 DEGREES
EKG VENTRICULAR RATE: 88 BPM
EOSINOPHILS RELATIVE PERCENT: 2 % (ref 1–4)
GFR AFRICAN AMERICAN: >60 ML/MIN
GFR NON-AFRICAN AMERICAN: >60 ML/MIN
GFR SERPL CREATININE-BSD FRML MDRD: ABNORMAL ML/MIN/{1.73_M2}
GLUCOSE BLD-MCNC: 106 MG/DL (ref 70–99)
HCT VFR BLD CALC: 34.3 % (ref 36.3–47.1)
HEMOGLOBIN: 10.6 G/DL (ref 11.9–15.1)
IMMATURE GRANULOCYTES: 0 %
LV EF: 40 %
LVEF MODALITY: NORMAL
LYMPHOCYTES # BLD: 10 % (ref 24–43)
MAGNESIUM: 1.7 MG/DL (ref 1.6–2.6)
MCH RBC QN AUTO: 28.4 PG (ref 25.2–33.5)
MCHC RBC AUTO-ENTMCNC: 30.9 G/DL (ref 28.4–34.8)
MCV RBC AUTO: 92 FL (ref 82.6–102.9)
MONOCYTES # BLD: 10 % (ref 3–12)
NRBC AUTOMATED: 0 PER 100 WBC
PDW BLD-RTO: 15.9 % (ref 11.8–14.4)
PLATELET # BLD: 156 K/UL (ref 138–453)
PMV BLD AUTO: 9.6 FL (ref 8.1–13.5)
POTASSIUM SERPL-SCNC: 3.7 MMOL/L (ref 3.7–5.3)
RBC # BLD: 3.73 M/UL (ref 3.95–5.11)
SEG NEUTROPHILS: 78 % (ref 36–65)
SEGMENTED NEUTROPHILS ABSOLUTE COUNT: 3.67 K/UL (ref 1.5–8.1)
SODIUM BLD-SCNC: 139 MMOL/L (ref 135–144)
TSH SERPL DL<=0.05 MIU/L-ACNC: 2.07 UIU/ML (ref 0.3–5)
WBC # BLD: 4.7 K/UL (ref 3.5–11.3)

## 2022-04-13 PROCEDURE — 6370000000 HC RX 637 (ALT 250 FOR IP): Performed by: INTERNAL MEDICINE

## 2022-04-13 PROCEDURE — 85025 COMPLETE CBC W/AUTO DIFF WBC: CPT

## 2022-04-13 PROCEDURE — 80048 BASIC METABOLIC PNL TOTAL CA: CPT

## 2022-04-13 PROCEDURE — 99232 SBSQ HOSP IP/OBS MODERATE 35: CPT | Performed by: INTERNAL MEDICINE

## 2022-04-13 PROCEDURE — 93010 ELECTROCARDIOGRAM REPORT: CPT | Performed by: INTERNAL MEDICINE

## 2022-04-13 PROCEDURE — 2580000003 HC RX 258: Performed by: INTERNAL MEDICINE

## 2022-04-13 PROCEDURE — 83735 ASSAY OF MAGNESIUM: CPT

## 2022-04-13 PROCEDURE — 36415 COLL VENOUS BLD VENIPUNCTURE: CPT

## 2022-04-13 PROCEDURE — 84443 ASSAY THYROID STIM HORMONE: CPT

## 2022-04-13 PROCEDURE — 6360000002 HC RX W HCPCS: Performed by: INTERNAL MEDICINE

## 2022-04-13 PROCEDURE — 93306 TTE W/DOPPLER COMPLETE: CPT

## 2022-04-13 RX ORDER — LISINOPRIL 2.5 MG/1
2.5 TABLET ORAL DAILY
Qty: 30 TABLET | Refills: 3 | Status: ON HOLD | OUTPATIENT
Start: 2022-04-14 | End: 2022-05-31

## 2022-04-13 RX ORDER — FUROSEMIDE 20 MG/1
20 TABLET ORAL DAILY
Status: DISCONTINUED | OUTPATIENT
Start: 2022-04-13 | End: 2022-04-13 | Stop reason: HOSPADM

## 2022-04-13 RX ORDER — FUROSEMIDE 20 MG/1
20 TABLET ORAL DAILY
Qty: 60 TABLET | Refills: 3 | Status: ON HOLD | OUTPATIENT
Start: 2022-04-13 | End: 2022-06-03 | Stop reason: HOSPADM

## 2022-04-13 RX ADMIN — LISINOPRIL 2.5 MG: 2.5 TABLET ORAL at 08:27

## 2022-04-13 RX ADMIN — ASPIRIN 81 MG: 81 TABLET, COATED ORAL at 08:24

## 2022-04-13 RX ADMIN — SODIUM CHLORIDE, PRESERVATIVE FREE 10 ML: 5 INJECTION INTRAVENOUS at 08:25

## 2022-04-13 RX ADMIN — FUROSEMIDE 20 MG: 10 INJECTION, SOLUTION INTRAMUSCULAR; INTRAVENOUS at 08:24

## 2022-04-13 NOTE — PROGRESS NOTES
[facility]  Office: [address], DO, Tania Casillas, DO, Rolf Walker, DO, Seamus Pearce Blood, DO, Johanna Alcocer MD, Pedro Acuña MD, Yamile Segovia MD, Álvaro Khan MD, Jenny Benitez MD, Kym Fisher MD, Oleg Calderon MD, David Griffith, DO, Remedios Harris, DO, Nathan Howell MD,  Poncho Yeh, DO, Norris Holter, MD, Priti Cheney MD, Tonie Hemphill MD, Elvira Sosa DO, Omid Gardner MD, Caity Brown MD, Maribeth Neri CNP, [facility] Robb, CNP, Justin Foster CNP, Jose Tong, CNP, Cheryle España, CNP, Eladio Wall, CNP, Jaleel Mohan PA-C, Colton Hale, DNP, Solis Harrell, CNP, Rosa M Paz, CNP, Mario Vásquez, CNP, Dallin Phipps, [facility], Esperanza Davila, [facility], Ngoc Mackay, CNP, Mikey Barnhart, CNP, Krupa Tcuker, [facility]    Progress Note    4/13/2022    1:00 PM    Name:   Belkis Tejada  MRN:     [id number]     Acct:      [de-identified]   Room:   [facility] Day:  1  Admit Date:  4/12/2022  3:37 PM    PCP:   Christine Officer  Code Status:  Full Code    Subjective:     C/C:   Chief Complaint   Patient presents with    Shortness of Breath     started during the night; pt reports she woke up to use the restroom and started wheezing with ANDERSON; denies hx of COPD; denies hx of asthma; denies CHF; hx of afib with ablation     Interval History Status: improved. Patient symptoms of shortness of breath have resolved. Denies any chest pain, nausea vomiting, fevers or chills. No further lower extremity edema. Brief History: This is a 71-year-old female that presents with progressive shortness of breath over several days with lower extremity edema and exertional dyspnea. She is found to have evidence of pulmonary edema and elevated BNP concerning for heart failure.   She is admitted for IV diuresis and underwent echocardiogram.  Echocardiogram showed reduced ejection fraction at 40% with associated valvular pathologies. Ultimately as she did not have any symptoms of angina and her symptoms resolved she was anxious for discharge and is agreeable to outpatient stress testing. Nuclear stress test was ordered for outpatient and she will follow up with her cardiologist after stress test imaging she was initiated on lisinopril as well as Lasix for her new finding of systolic dysfunction. Review of Systems:     Constitutional:  negative for chills, fevers, sweats  Respiratory:  negative for cough, dyspnea on exertion, shortness of breath, wheezing  Cardiovascular:  negative for chest pain, chest pressure/discomfort, lower extremity edema, palpitations  Gastrointestinal:  negative for abdominal pain, constipation, diarrhea, nausea, vomiting  Neurological:  negative for dizziness, headache    Medications: Allergies:  No Known Allergies    Current Meds:   Scheduled Meds:    furosemide  20 mg Oral Daily    aspirin  81 mg Oral Daily    rivaroxaban  20 mg Oral Dinner    therapeutic multivitamin-minerals  1 tablet Oral Daily    gabapentin  100 mg Oral TID    sodium chloride flush  5-40 mL IntraVENous 2 times per day    lisinopril  2.5 mg Oral Daily     Continuous Infusions:    sodium chloride       PRN Meds: sodium chloride flush, sodium chloride, polyethylene glycol, acetaminophen **OR** acetaminophen, perflutren lipid microspheres    Data:     Past Medical History:   has a past medical history of Anxiety, Arthritis, Asthmatic bronchitis, unspecified asthma severity, uncomplicated, Atrial fibrillation (HCC), Dyspnea on exertion, GERD (gastroesophageal reflux disease), Hyperlipidemia, Hypertension, Mitral valve disorder, Moderate to severe pulmonary hypertension ([address] Osteoarthritis, Palpitation, Physical deconditioning, Shortness of breath, Sleep apnea, Spinal stenosis at L4-L5 level, Stage 3b chronic kidney disease ([address] Tachycardia, and Tricuspid regurgitation.     Social History:   reports that she has never smoked. She has never used smokeless tobacco. She reports current alcohol use of about 5.0 standard drinks of alcohol per week. She reports that she does not use drugs. Family History:   Family History   Problem Relation Age of Onset    High Blood Pressure Mother     Arthritis Mother     Diabetes Father     Arthritis Sister        Vitals:  /71   Pulse 78   Temp 98.4 °F (36.9 °C) (Oral)   Resp 16   Ht 5' 8\" (1.727 m)   Wt 137 lb 1 oz (62.2 kg)   SpO2 97%   BMI 20.84 kg/m²   Temp (24hrs), Av.1 °F (36.7 °C), Min:97.9 °F (36.6 °C), Max:98.4 °F (36.9 °C)    No results for input(s): POCGLU in the last 72 hours. I/O (24Hr):     Intake/Output Summary (Last 24 hours) at 2022 1300  Last data filed at 2022 0831  Gross per 24 hour   Intake --   Output 2000 ml   Net -2000 ml       Labs:  Hematology:  Recent Labs     22  1608 22  0846   WBC 5.8 4.7   RBC 3.69* 3.73*   HGB 10.5* 10.6*   HCT 34.2* 34.3*   MCV 92.7 92.0   MCH 28.5 28.4   MCHC 30.7 30.9   RDW 16.0* 15.9*    156   MPV 10.0 9.6   DDIMER 0.81*  --      Chemistry:  Recent Labs     22  1608 22  1820 22  2202 22  0846     --   --  139   K 3.8  --   --  3.7     --   --  102   CO2 23  --   --  26   GLUCOSE 105*  --   --  106*   BUN 15  --   --  14   CREATININE 0.94*  --   --  0.88   MG  --   --   --  1.7   ANIONGAP 11  --   --  11   LABGLOM 58*  --   --  >60   GFRAA >60  --   --  >60   CALCIUM 9.2  --   --  9.2   PROBNP 2,870*  --   --   --    TROPHS 24* 25* 26*  --    MYOGLOBIN 75* 66*  --   --      Recent Labs     22  0846   TSH 2.07     ABG:  Lab Results   Component Value Date    POCPH 7.433 2018    POCPCO2 38.3 2018    POCPO2 78.9 2018    POCHCO3 25.6 2018    NBEA NOT REPORTED 2018    PBEA 1 2018    TBC8CTZ 27 2018    XMLX1EHQ 96 2018    FIO2 NOT REPORTED 2018     Lab Results   Component Value Date/Time SPECIAL NOT REPORTED 01/30/2015 10:44 AM     No results found for: CULTURE    Radiology:  XR CHEST PORTABLE    Result Date: 4/12/2022  Mild cardiomegaly with interstitial pulmonary edema. Trace bilateral pleural effusions. Physical Examination:        General appearance:  alert, cooperative and no distress  Mental Status:  oriented to person, place and time and normal affect  Lungs:  clear to auscultation bilaterally, normal effort  Heart:  regular rate and rhythm, no murmur  Abdomen:  soft, nontender, nondistended, normal bowel sounds, no masses, hepatomegaly, splenomegaly  Extremities:  no edema, redness, tenderness in the calves  Skin:  no gross lesions, rashes, induration    Assessment:        Hospital Problems           Last Modified POA    * (Principal) Acute on chronic combined systolic and diastolic CHF (congestive heart failure) ([address] 4/13/2022 Yes    Paroxysmal A-fib [address] 4/12/2022 Yes    Primary osteoarthritis involving multiple joints 4/12/2022 Yes    Essential hypertension 4/12/2022 Yes          Plan:        1. Change to oral Lasix  2. Continue lisinopril, dose can be titrated outpatient as blood pressure allows  3. Cardiac diet  4. Preliminary echo reviewed with patient, reduced ejection fraction 40%, recommend stress testing. Patient wishes to proceed as an outpatient  5. Follow-up with cardiology and PCP  6.  Discharge home today    Luis Fernando Francis DO  4/13/2022  1:00 PM

## 2022-04-13 NOTE — PROGRESS NOTES
CLINICAL PHARMACY NOTE: MEDS TO BEDS    Total # of Prescriptions Filled: 1   The following medications were delivered to the patient:  · Lisinopril 2.5mg    Additional Documentation:  Pt declined furosemide 20mg

## 2022-04-13 NOTE — PROGRESS NOTES
Pt discharged to home in stable condition with belongings  Discharge instructions given  \"Meds To Beds\" medication at bedside  Pt denies having any further questions at this time  Personal items given to patient at discharge  Patient/family state they have everything they were admitted with. Patient filled Lisinopril script. She states she has lasix at home.

## 2022-04-13 NOTE — PROGRESS NOTES
Pt has refused both 0400 vitals as well as the 0600 lab draw. Pt was cooperative when she arrived at the floor but wanted to be left alone as quickly as possible. )600 lab draw rescheduled for 0900. Hourly visual checks performed to ensure patient safety, bed in lowest position, call light and end table in reach.

## 2022-04-13 NOTE — PROGRESS NOTES
Pt admitted to room 1014. Pt vitals stable, heart monitor applied, clean gown given. Bed in lowest position, call light and end table in reach.

## 2022-04-13 NOTE — PLAN OF CARE
Problem: OXYGENATION/RESPIRATORY FUNCTION  Goal: Patient will maintain patent airway  Outcome: Completed  Goal: Patient will achieve/maintain normal respiratory rate/effort  Description: Respiratory rate and effort will be within normal limits for the patient  Outcome: Completed     Problem: HEMODYNAMIC STATUS  Goal: Patient has stable vital signs and fluid balance  Outcome: Completed     Problem: FLUID AND ELECTROLYTE IMBALANCE  Goal: Fluid and electrolyte balance are achieved/maintained  Outcome: Completed     Problem: ACTIVITY INTOLERANCE/IMPAIRED MOBILITY  Goal: Mobility/activity is maintained at optimum level for patient  Outcome: Completed

## 2022-04-13 NOTE — DISCHARGE SUMMARY
[facility]  Office: [address], DO, Aristeo Elena, DO, Bryn Santiago, DO, Leonor Calvillo, DO, Ele Cardona MD, Connie Rees MD, Caroline Cunningham MD, Jada Phelps MD, Rashida Perez MD, Ramona Anguiano MD, Susan Santiago MD, Maria G Preciado, DO, Dana Foster, DO, Luigi Bernal MD,  Jina Pleitez, DO, Venus Figueredo MD, Jorge Rosa MD, Howard Quesada MD, [facility], DO, Dede Arzate MD, Maximino Mendieta MD, Prakash Cornelius [facility], [facility], CNP, Jacoby Lopez, CNP, Mikey Smith, CNP, Manuel Ragland, CNP, Jeanine Delong, CNP, Enrico Cruz PA-C, Damian Rodas, [facility], Sandy Aparicio, CNP, Ja Driscoll CNP, Yin Contreras, CNP, Ale Mccall, CNS, Ced Oh, [facility], Meron Loyd, CNP, Ivette Vega, CNP, Rachel Hays, [facility]    Discharge Summary     Patient ID: Remy Ley  :  1950   MRN: [id number]     ACCOUNT:  [de-identified]   Patient's PCP: Tony Sellers Date: 2022   Discharge Date: 2022     Length of Stay: 1  Code Status:  Full Code  Admitting Physician: Nalini Carrillo DO  Discharge Physician: Nalini Carrillo DO     Active Discharge Diagnoses:     Hospital Problem Lists:  Principal Problem:    Acute on chronic combined systolic and diastolic CHF (congestive heart failure) ([facility] [address]  Active Problems:    Paroxysmal A-fib ([facility] [address]    Primary osteoarthritis involving multiple joints    Essential hypertension  Resolved Problems:    * No resolved hospital problems.  *      Admission Condition:  fair     Discharged Condition: stable    Hospital Stay:     Hospital Course:  Remy Ley is a 67 y.o. female who was admitted for the management of  Acute on chronic combined systolic and diastolic CHF (congestive heart failure) ([facility]) , presented to ER with Shortness of Breath (started during the night; pt reports she woke up to use the restroom and started wheezing with [facility]; denies hx of COPD; denies hx of asthma; denies CHF; hx of afib with ablation)    This is a 74-year-old female presents with a complaint of shortness of breath with lower extremity edema and exertional dyspnea. Upon evaluation she is found to have evidence of pulmonary edema on x-ray as well as elevated BNP concerning for congestive heart for. She was treated with 1 dose of IV Lasix in the emergency room and subsequently admitted for further evaluation. She was continued on IV Lasix and initiated on low-dose lisinopril. General echocardiogram with reduced ejection fraction, will need outpatient stress test.  Ultimately her symptoms resolved she was able to be discharged on oral Lasix and lisinopril with close outpatient follow-up and outpatient stress testing. Significant therapeutic interventions: As above    Significant Diagnostic Studies:   Labs / Micro:  CBC:   Lab Results   Component Value Date    WBC 4.7 04/13/2022    RBC 3.73 04/13/2022    HGB 10.6 04/13/2022    HCT 34.3 04/13/2022    MCV 92.0 04/13/2022    MCH 28.4 04/13/2022    MCHC 30.9 04/13/2022    RDW 15.9 04/13/2022     04/13/2022     BMP:    Lab Results   Component Value Date    GLUCOSE 106 04/13/2022     04/13/2022    K 3.7 04/13/2022     04/13/2022    CO2 26 04/13/2022    ANIONGAP 11 04/13/2022    BUN 14 04/13/2022    CREATININE 0.88 04/13/2022    BUNCRER 16 04/13/2022    CALCIUM 9.2 04/13/2022    LABGLOM >60 04/13/2022    GFRAA >60 04/13/2022    GFR      04/13/2022        Radiology:  XR CHEST PORTABLE    Result Date: 4/12/2022  Mild cardiomegaly with interstitial pulmonary edema. Trace bilateral pleural effusions.        Consultations:    Consults:     Final Specialist Recommendations/Findings:   IP CONSULT TO HOSPITALIST  IP CONSULT TO HEART FAILURE NURSE/COORDINATOR  IP CONSULT TO DIETITIAN      The patient was seen and examined on day of discharge and this discharge summary is in conjunction with any daily progress have left bundle branch block (LBBB) or left ventricular hypertrophy (LVH) based on resting ECG, a ventricular pacemaker, or is unable to exercise on a treadmill based on physical or mental limitations? No    Based on answer, patient may meet criteria for an exercise stress test. Please either cancel pharmacologic stress test and order an exercise stress test or provide reason for continuation of pharmacologic stress test. exertional dyspnea        Time Spent on discharge is  27-minute in patient examination, evaluation, counseling as well as medication reconciliation, prescriptions for required medications, discharge plan and follow up. Electronically signed by   Brayan Jasmine DO  4/13/2022  11:03 AM      Thank you Dr. Oniel Richardson for the opportunity to be involved in this patient's care.

## 2022-04-13 NOTE — CARE COORDINATION
Case Management Initial Discharge Plan  Donnie Neff,         Readmission Risk              Risk of Unplanned Readmission:  10             Met with:patient to discuss discharge plans. Information verified: address, contacts, phone number, , insurance Yes  PCP: Tracie Engle  Date of last visit: 3/15    Insurance Provider: Laya Faustin [facility]     Discharge Planning  Current Residence:  Private home   Living Arrangements:  [address] has 1 stories/4 stairs to climb  Support Systems:  Family Members       Current Services PTA:  None  Agency: none      Patient able to perform ADL's:Independent  DME in home:  None   DME used to aid ambulation prior to admission:   none  DME used during admission:  none    Potential Assistance Needed:  N/A    Pharmacy: [facility] on [address]    Potential Assistance Purchasing Medications:  No  Does patient want to participate in local refill/ meds to beds program?  Yes    Patient agreeable to home care: No  [place] of choice provided:  n/a      Type of Home Care Services:  None  Patient expects to be discharged to:    home     Prior SNF/Rehab Placement and Facility: none  Agreeable to SNF/Rehab: No  [place] of choice provided: n/a   Evaluation: n/a    Expected Discharge date:  22  Follow Up Appointment: Best Day/ Time:   any    Transportation provider: per family  Transportation arrangements needed for discharge: No    Discharge Plan:   Met with patient to complete discharge assessment. Patient lives at home alone and is very independent. Drives. Uses no DME. She sees Dr Pako Leblanc outpatient for her a fib and is on xarelto as home med. She is admitted with CHF and currently on RA> she is asking to go home today. Will follow but no anticipated needs for discharge.  Declining any need for home care or CHF clinic     Electronically signed by Jon Manzo RN on 22 at 11:57 AM EDT

## 2022-04-13 NOTE — PROGRESS NOTES
Nutrition Education    · Verbally reviewed information with Patient  · Educated on Low Sodium Nutrition Therapy  · Written educational materials provided. · Contact name and number provided. · Refer to Patient Education activity for more details. Patient initially diet not want Low sodium diet education but did accept a handout.              Astrid YIPN, RDN, LDN  Lead Clinical Dietitian  RD Office Phone [phone]

## 2022-04-13 NOTE — ACP (ADVANCE CARE PLANNING)
minutes:      Conversation Outcomes:  [x] ACP discussion completed  [] Existing advance directive reviewed with patient; no changes to patient's previously recorded wishes  [] New Advance Directive completed  [] Portable Do Not Rescitate prepared for Provider review and signature  [] POLST/POST/MOLST/MOST prepared for Provider review and signature      Follow-up plan:    [] Schedule follow-up conversation to continue planning  [] Referred individual to Provider for additional questions/concerns   [] Advised patient/agent/surrogate to review completed ACP document and update if needed with changes in condition, patient preferences or care setting    [] This note routed to one or more involved healthcare providers

## 2022-04-14 ENCOUNTER — CARE COORDINATION (OUTPATIENT)
Dept: CASE MANAGEMENT | Age: 72
End: 2022-04-14

## 2022-04-14 NOTE — CARE COORDINATION
[facility] Transitions Initial Follow Up Call    Call within 2 business days of discharge: Yes    Patient: Dieter Richards Patient : 1950   MRN: [id number]  Reason for Admission: Acute pulmonary edema  Discharge Date: 22 RARS: Readmission Risk Score: 9.6 ( )      Last Discharge [facility]       Complaint Diagnosis Description Type Department Provider    22 Shortness of Breath Acute pulmonary edema ([address] . .. ED to Hosp-Admission (Discharged) (ADMITTED) [facility], ; Monica Chapman. ..           1st attempt to reach patient for Care Transitions. [facility] requesting return call. Contact information provided. [place] [address] returned call. She stated she was doing good. She denied any further shortness of breath, chest pain, cough or swelling. Reviewed daily weight and low sodium diet. Reviewed upcoming lab work and stress test.  She has called her PCP and is attempting to make follow up. Will be seeing nephrologist on  and will be scheduling with cardiologist  Reviewed medications and she stated that she had all her medications. She had no further questions or concerns. Will not follow for transitions due to having a non [facility] provider      Non-face-to-face services provided:  Obtained and reviewed discharge summary and/or continuity of care documents  Assessment and support for treatment adherence and medication management-reviewed     Transitions of Care Initial Call    Was this an external facility discharge? No Discharge Facility: [facility]    Challenges to be reviewed by the provider   Additional needs identified to be addressed with provider: No  none             Method of communication with provider : none      Advance Care Planning:   Does patient have an Advance Directive: addressed while in hospital.     Was this a readmission?  No  Patient stated reason for admission: shortness of breath  Patients top risk factors for readmission: medical condition-CHF    Care Transition Nurse (CTN) contacted the patient by telephone to perform post hospital discharge assessment. Verified name and  with patient as identifiers. Provided introduction to self, and explanation of the CTN role. CTN reviewed discharge instructions, medical action plan and red flags with patient who verbalized understanding. Patient given an opportunity to ask questions and does not have any further questions or concerns at this time. Were discharge instructions available to patient? Yes. Reviewed appropriate site of care based on symptoms and resources available to patient including: PCP  Specialist  When to call 911. The patient agrees to contact the PCP office for questions related to their healthcare. Medication reviewed was performed with patient, who verbalizes understanding of administration of home medications. Covid Risk Education     Educated patient about risk for severe COVID-19 due to risk factors according to CDC guidelines. CTN reviewed discharge instructions, medical action plan and red flag symptoms with the patient who verbalized understanding. Discussed COVID vaccination status: Yes and vaccinated and has had booster. Education provided on COVID-19 vaccination as appropriate. Discussed exposure protocols and quarantine with CDC Guidelines. Patient was given an opportunity to verbalize any questions and concerns and agrees to contact CTN or health care provider for questions related to their healthcare. Reviewed and educated patient on any new and changed medications related to discharge diagnosis. Was patient discharged with a pulse oximeter? No      CTN provided contact information. No further follow-up call indicated based on severity of symptoms and risk factors.   Plan for next call: final call pt with non [facility] provider        Care Transitions 24 Hour Call    Care Transitions Interventions         Follow Up  Future Appointments   Date Time Provider Department Center   7/12/2022  9:15 AM Hallie Marte MD [facility] Simin Bustos RN

## 2022-05-04 ENCOUNTER — CARE COORDINATION (OUTPATIENT)
Dept: CARE COORDINATION | Age: 72
End: 2022-05-04

## 2022-05-30 ENCOUNTER — HOSPITAL ENCOUNTER (INPATIENT)
Age: 72
LOS: 4 days | Discharge: HOME | DRG: 309 | End: 2022-06-03
Attending: EMERGENCY MEDICINE | Admitting: INTERNAL MEDICINE
Payer: MEDICARE

## 2022-05-30 ENCOUNTER — APPOINTMENT (OUTPATIENT)
Dept: GENERAL RADIOLOGY | Age: 72
DRG: 309 | End: 2022-05-30
Payer: MEDICARE

## 2022-05-30 DIAGNOSIS — N17.9 AKI (ACUTE KIDNEY INJURY) (HCC): ICD-10-CM

## 2022-05-30 DIAGNOSIS — I48.91 ATRIAL FIBRILLATION WITH RAPID VENTRICULAR RESPONSE (HCC): Primary | ICD-10-CM

## 2022-05-30 DIAGNOSIS — R79.89 ELEVATED BRAIN NATRIURETIC PEPTIDE (BNP) LEVEL: ICD-10-CM

## 2022-05-30 DIAGNOSIS — N18.9 ACUTE KIDNEY INJURY SUPERIMPOSED ON CKD (HCC): ICD-10-CM

## 2022-05-30 DIAGNOSIS — N17.9 ACUTE KIDNEY INJURY SUPERIMPOSED ON CKD (HCC): ICD-10-CM

## 2022-05-30 DIAGNOSIS — I50.43 ACUTE ON CHRONIC COMBINED SYSTOLIC AND DIASTOLIC CHF (CONGESTIVE HEART FAILURE) (HCC): ICD-10-CM

## 2022-05-30 DIAGNOSIS — I48.91 ATRIAL FIBRILLATION WITH RVR (HCC): ICD-10-CM

## 2022-05-30 PROBLEM — I50.42 CHRONIC COMBINED SYSTOLIC AND DIASTOLIC CHF (CONGESTIVE HEART FAILURE) (HCC): Status: ACTIVE | Noted: 2020-06-23

## 2022-05-30 LAB
ABSOLUTE EOS #: 0.06 K/UL (ref 0–0.4)
ABSOLUTE IMMATURE GRANULOCYTE: 0.06 K/UL (ref 0–0.3)
ABSOLUTE LYMPH #: 0.6 K/UL (ref 1–4.8)
ABSOLUTE MONO #: 0.54 K/UL (ref 0.2–0.8)
ALBUMIN SERPL-MCNC: 4.1 G/DL (ref 3.5–5.2)
ALP BLD-CCNC: 101 U/L (ref 35–104)
ALT SERPL-CCNC: 19 U/L (ref 5–33)
ANION GAP SERPL CALCULATED.3IONS-SCNC: 13 MMOL/L (ref 9–17)
AST SERPL-CCNC: 18 U/L
BASOPHILS # BLD: 1 %
BASOPHILS ABSOLUTE: 0.06 K/UL (ref 0–0.2)
BILIRUB SERPL-MCNC: 0.58 MG/DL (ref 0.3–1.2)
BUN BLDV-MCNC: 30 MG/DL (ref 8–23)
BUN/CREAT BLD: 23 (ref 9–20)
CALCIUM SERPL-MCNC: 9.6 MG/DL (ref 8.6–10.4)
CHLORIDE BLD-SCNC: 102 MMOL/L (ref 98–107)
CO2: 26 MMOL/L (ref 20–31)
CREAT SERPL-MCNC: 1.28 MG/DL (ref 0.5–0.9)
EOSINOPHILS RELATIVE PERCENT: 1 % (ref 1–4)
GFR AFRICAN AMERICAN: 50 ML/MIN
GFR NON-AFRICAN AMERICAN: 41 ML/MIN
GFR SERPL CREATININE-BSD FRML MDRD: ABNORMAL ML/MIN/{1.73_M2}
GLUCOSE BLD-MCNC: 123 MG/DL (ref 70–99)
HCT VFR BLD CALC: 36.2 % (ref 36.3–47.1)
HEMOGLOBIN: 10.8 G/DL (ref 11.9–15.1)
IMMATURE GRANULOCYTES: 1 %
LYMPHOCYTES # BLD: 10 % (ref 24–44)
MAGNESIUM: 2 MG/DL (ref 1.6–2.6)
MCH RBC QN AUTO: 27 PG (ref 25.2–33.5)
MCHC RBC AUTO-ENTMCNC: 29.8 G/DL (ref 28.4–34.8)
MCV RBC AUTO: 90.5 FL (ref 82.6–102.9)
MONOCYTES # BLD: 9 % (ref 1–7)
PDW BLD-RTO: 17.6 % (ref 11.8–14.4)
PLATELET # BLD: 279 K/UL (ref 138–453)
PMV BLD AUTO: 9.9 FL (ref 8.1–13.5)
POTASSIUM SERPL-SCNC: 4 MMOL/L (ref 3.7–5.3)
PRO-BNP: 9616 PG/ML
RBC # BLD: 4 M/UL (ref 3.95–5.11)
SEG NEUTROPHILS: 78 % (ref 36–66)
SEGMENTED NEUTROPHILS ABSOLUTE COUNT: 4.68 K/UL (ref 1.8–7.7)
SODIUM BLD-SCNC: 141 MMOL/L (ref 135–144)
TOTAL PROTEIN: 7.2 G/DL (ref 6.4–8.3)
TROPONIN, HIGH SENSITIVITY: 31 NG/L (ref 0–14)
TSH SERPL DL<=0.05 MIU/L-ACNC: 2.11 UIU/ML (ref 0.3–5)
WBC # BLD: 6 K/UL (ref 3.5–11.3)

## 2022-05-30 PROCEDURE — 2060000000 HC ICU INTERMEDIATE R&B

## 2022-05-30 PROCEDURE — 6360000002 HC RX W HCPCS: Performed by: NURSE PRACTITIONER

## 2022-05-30 PROCEDURE — 84484 ASSAY OF TROPONIN QUANT: CPT

## 2022-05-30 PROCEDURE — 84443 ASSAY THYROID STIM HORMONE: CPT

## 2022-05-30 PROCEDURE — 85025 COMPLETE CBC W/AUTO DIFF WBC: CPT

## 2022-05-30 PROCEDURE — 99222 1ST HOSP IP/OBS MODERATE 55: CPT | Performed by: NURSE PRACTITIONER

## 2022-05-30 PROCEDURE — 83880 ASSAY OF NATRIURETIC PEPTIDE: CPT

## 2022-05-30 PROCEDURE — 2580000003 HC RX 258: Performed by: NURSE PRACTITIONER

## 2022-05-30 PROCEDURE — 6370000000 HC RX 637 (ALT 250 FOR IP): Performed by: NURSE PRACTITIONER

## 2022-05-30 PROCEDURE — 99285 EMERGENCY DEPT VISIT HI MDM: CPT

## 2022-05-30 PROCEDURE — 93005 ELECTROCARDIOGRAM TRACING: CPT | Performed by: EMERGENCY MEDICINE

## 2022-05-30 PROCEDURE — 83735 ASSAY OF MAGNESIUM: CPT

## 2022-05-30 PROCEDURE — 2580000003 HC RX 258: Performed by: EMERGENCY MEDICINE

## 2022-05-30 PROCEDURE — 80053 COMPREHEN METABOLIC PANEL: CPT

## 2022-05-30 PROCEDURE — 6360000002 HC RX W HCPCS: Performed by: EMERGENCY MEDICINE

## 2022-05-30 PROCEDURE — 71045 X-RAY EXAM CHEST 1 VIEW: CPT

## 2022-05-30 PROCEDURE — 36415 COLL VENOUS BLD VENIPUNCTURE: CPT

## 2022-05-30 PROCEDURE — 96374 THER/PROPH/DIAG INJ IV PUSH: CPT

## 2022-05-30 PROCEDURE — 2500000003 HC RX 250 WO HCPCS: Performed by: EMERGENCY MEDICINE

## 2022-05-30 RX ORDER — FUROSEMIDE 20 MG/1
20 TABLET ORAL DAILY
Status: DISCONTINUED | OUTPATIENT
Start: 2022-05-30 | End: 2022-06-03 | Stop reason: HOSPADM

## 2022-05-30 RX ORDER — ONDANSETRON 4 MG/1
4 TABLET, ORALLY DISINTEGRATING ORAL EVERY 8 HOURS PRN
Status: DISCONTINUED | OUTPATIENT
Start: 2022-05-30 | End: 2022-06-03 | Stop reason: HOSPADM

## 2022-05-30 RX ORDER — POLYETHYLENE GLYCOL 3350 17 G/17G
17 POWDER, FOR SOLUTION ORAL DAILY PRN
Status: DISCONTINUED | OUTPATIENT
Start: 2022-05-30 | End: 2022-06-03 | Stop reason: HOSPADM

## 2022-05-30 RX ORDER — ONDANSETRON 2 MG/ML
4 INJECTION INTRAMUSCULAR; INTRAVENOUS EVERY 6 HOURS PRN
Status: DISCONTINUED | OUTPATIENT
Start: 2022-05-30 | End: 2022-06-01

## 2022-05-30 RX ORDER — SODIUM CHLORIDE 0.9 % (FLUSH) 0.9 %
10 SYRINGE (ML) INJECTION PRN
Status: DISCONTINUED | OUTPATIENT
Start: 2022-05-30 | End: 2022-06-03 | Stop reason: HOSPADM

## 2022-05-30 RX ORDER — SODIUM CHLORIDE 9 MG/ML
INJECTION, SOLUTION INTRAVENOUS PRN
Status: DISCONTINUED | OUTPATIENT
Start: 2022-05-30 | End: 2022-06-03 | Stop reason: HOSPADM

## 2022-05-30 RX ORDER — MAGNESIUM SULFATE 1 G/100ML
1000 INJECTION INTRAVENOUS PRN
Status: DISCONTINUED | OUTPATIENT
Start: 2022-05-30 | End: 2022-06-03 | Stop reason: HOSPADM

## 2022-05-30 RX ORDER — SODIUM CHLORIDE 0.9 % (FLUSH) 0.9 %
5-40 SYRINGE (ML) INJECTION EVERY 12 HOURS SCHEDULED
Status: DISCONTINUED | OUTPATIENT
Start: 2022-05-30 | End: 2022-06-03 | Stop reason: HOSPADM

## 2022-05-30 RX ORDER — ACETAMINOPHEN 650 MG/1
650 SUPPOSITORY RECTAL EVERY 6 HOURS PRN
Status: DISCONTINUED | OUTPATIENT
Start: 2022-05-30 | End: 2022-06-03 | Stop reason: HOSPADM

## 2022-05-30 RX ORDER — M-VIT,TX,IRON,MINS/CALC/FOLIC 27MG-0.4MG
1 TABLET ORAL DAILY
Status: DISCONTINUED | OUTPATIENT
Start: 2022-05-30 | End: 2022-06-03 | Stop reason: HOSPADM

## 2022-05-30 RX ORDER — METOPROLOL TARTRATE 5 MG/5ML
2.5 INJECTION INTRAVENOUS ONCE
Status: COMPLETED | OUTPATIENT
Start: 2022-05-30 | End: 2022-05-30

## 2022-05-30 RX ORDER — ASPIRIN 81 MG/1
81 TABLET ORAL DAILY
Status: DISCONTINUED | OUTPATIENT
Start: 2022-05-31 | End: 2022-06-03 | Stop reason: HOSPADM

## 2022-05-30 RX ORDER — LISINOPRIL 2.5 MG/1
2.5 TABLET ORAL DAILY
Status: DISCONTINUED | OUTPATIENT
Start: 2022-05-31 | End: 2022-05-31

## 2022-05-30 RX ORDER — POTASSIUM CHLORIDE 7.45 MG/ML
10 INJECTION INTRAVENOUS PRN
Status: DISCONTINUED | OUTPATIENT
Start: 2022-05-30 | End: 2022-06-03 | Stop reason: HOSPADM

## 2022-05-30 RX ORDER — ACETAMINOPHEN 325 MG/1
650 TABLET ORAL EVERY 6 HOURS PRN
Status: DISCONTINUED | OUTPATIENT
Start: 2022-05-30 | End: 2022-06-03 | Stop reason: HOSPADM

## 2022-05-30 RX ORDER — POTASSIUM CHLORIDE 20 MEQ/1
40 TABLET, EXTENDED RELEASE ORAL PRN
Status: DISCONTINUED | OUTPATIENT
Start: 2022-05-30 | End: 2022-06-03 | Stop reason: HOSPADM

## 2022-05-30 RX ADMIN — FUROSEMIDE 20 MG: 20 TABLET ORAL at 20:18

## 2022-05-30 RX ADMIN — AMIODARONE HYDROCHLORIDE 1 MG/MIN: 50 INJECTION, SOLUTION INTRAVENOUS at 17:12

## 2022-05-30 RX ADMIN — MULTIPLE VITAMINS W/ MINERALS TAB 1 TABLET: TAB at 20:18

## 2022-05-30 RX ADMIN — AMIODARONE HYDROCHLORIDE 150 MG: 50 INJECTION, SOLUTION INTRAVENOUS at 17:00

## 2022-05-30 RX ADMIN — RIVAROXABAN 20 MG: 20 TABLET, FILM COATED ORAL at 20:18

## 2022-05-30 RX ADMIN — METOPROLOL TARTRATE 2.5 MG: 5 INJECTION INTRAVENOUS at 18:04

## 2022-05-30 RX ADMIN — AMIODARONE HYDROCHLORIDE 0.5 MG/MIN: 50 INJECTION, SOLUTION INTRAVENOUS at 23:15

## 2022-05-30 ASSESSMENT — ENCOUNTER SYMPTOMS
COUGH: 0
NAUSEA: 0
DIARRHEA: 0
ABDOMINAL PAIN: 0
SHORTNESS OF BREATH: 1
BACK PAIN: 0
CONSTIPATION: 0
VOMITING: 0
CHEST TIGHTNESS: 0

## 2022-05-30 NOTE — ED PROVIDER NOTES
[facility]  Emergency Department Encounter     Pt Name: Beatris Goss  MRN: [id number]  [place] 1950  Date of evaluation: 5/30/22  PCP:  Dora Hidalgo       Chief Complaint   Patient presents with    Shortness of Breath     Pt reports SOB for several weeks; hx of afib and needs ablation; pt reports she hasn't been able to get an appointment in over a month; diagnosed with Covid on 5/10     Atrial Fibrillation       HISTORY OF PRESENT ILLNESS  (Location/Symptom, Timing/Onset, Context/Setting, Quality, Duration, Modifying Factors, Severity.)    Beatris Goss is a 67 y.o. female who presents with 1 week of fatigue, generalized weakness, lightheadedness and dizziness, shortness of breath. She denies any chest pain or palpitations. States that her leg swelling has been at baseline for her. She has a known history of atrial fibrillation and had an ablation back in November, however recently she is started having issues again. She is compliant with her Xarelto as well as Lasix and Lopressor. She has been fine with her primary care provider who recently has had her increase her Lasix due to concern for pleural effusion on her chest x-ray. She did have COVID back on 5/10 but states that she was initially feeling better after this. She follows with Dr. Deanna Ewing group and Dr. Tom hassan was the one who did her ablation before.     PAST MEDICAL / SURGICAL / SOCIAL / FAMILY HISTORY    has a past medical history of Anxiety, Arthritis, Asthmatic bronchitis, unspecified asthma severity, uncomplicated, Atrial fibrillation (HCC), Dyspnea on exertion, GERD (gastroesophageal reflux disease), Hyperlipidemia, Hypertension, Mitral valve disorder, Moderate to severe pulmonary hypertension (HCC), Osteoarthritis, Palpitation, Physical deconditioning, Shortness of breath, Sleep apnea, Spinal stenosis at L4-L5 level, Stage 3b chronic kidney disease ([facility] [address] Tachycardia, and Tricuspid regurgitation. has a past surgical history that includes knee joint manipulation (Right); joint replacement (Right); joint replacement (Left); Colonoscopy; Spine surgery (07/2017); Cardiac catheterization (05/19/2014); Cardiac catheterization (01/20/2016); Endoscopy, colon, diagnostic; and Cardiac surgery (11/01/2021). Social History     Socioeconomic History    Marital status: Single     Spouse name: Not on file    Number of children: Not on file    Years of education: Not on file    Highest education level: Not on file   Occupational History    Occupation: dog    Tobacco Use    Smoking status: Never Smoker    Smokeless tobacco: Never Used   Substance and Sexual Activity    Alcohol use: Yes     Alcohol/week: 5.0 standard drinks     Types: 5 Cans of beer per week     Comment: weekly    Drug use: No    Sexual activity: Never   Other Topics Concern    Not on file   Social History Narrative    Not on file     Social Determinants of Health     Financial Resource Strain: Low Risk     Difficulty of Paying Living Expenses: Not hard at all   Food Insecurity: No Food Insecurity    Worried About Running Out of Food in the Last Year: Never true    Mai of Food in the Last Year: Never true   Transportation Needs: No Transportation Needs    Lack of Transportation (Medical): No    Lack of Transportation (Non-Medical):  No   Physical Activity:     Days of Exercise per Week: Not on file    Minutes of Exercise per Session: Not on file   Stress:     Feeling of Stress : Not on file   Social Connections:     Frequency of Communication with Friends and Family: Not on file    Frequency of Social Gatherings with Friends and Family: Not on file    Attends Temple Services: Not on file    Active Member of Clubs or Organizations: Not on file    Attends Club or Organization Meetings: Not on file    Marital Status: Not on file   Intimate Partner Violence:     Fear of Current or Ex-Partner: Not on file    Emotionally Abused: Not on file    Physically Abused: Not on file    Sexually Abused: Not on file   Housing Stability:     Unable to Pay for Housing in the Last Year: Not on file    Number of Places Lived in the Last Year: Not on file    Unstable Housing in the Last Year: Not on file       Family History   Problem Relation Age of Onset    High Blood Pressure Mother     Arthritis Mother     Diabetes Father     Arthritis Sister        Allergies:    Patient has no known allergies. Home Medications:  Prior to Admission medications    Medication Sig Start Date End Date Taking? Authorizing Provider   lisinopril (PRINIVIL;ZESTRIL) 2.5 MG tablet Take 1 tablet by mouth daily 22   Nile Miles, DO   furosemide (LASIX) 20 MG tablet Take 1 tablet by mouth daily 22   Nile Miles, DO   gabapentin (NEURONTIN) 100 MG capsule Take 1 capsule by mouth 3 times daily for 30 days. 3/9/22 4/8/22  Brayan Contreras MD   XARELTO 20 MG TABS tablet Take 20 mg by mouth Daily with supper  21   Historical Provider, MD   Multiple Vitamins-Minerals (THERAPEUTIC MULTIVITAMIN-MINERALS) tablet Take 1 tablet by mouth daily    Historical Provider, MD   Handicap Placard MISC by Does not apply route  5 years from origninal written date 2022    Dx: Osteoarthritis unable to ambulate over 150ft 17   Brayan Contreras MD   aspirin 81 MG tablet Take 81 mg by mouth daily. Historical Provider, MD       REVIEW OF SYSTEMS    (2-9 systems for level 4, 10 or more for level 5)    Review of Systems   Constitutional: Positive for fatigue. HENT: Negative for tinnitus. Eyes: Negative for visual disturbance. Respiratory: Positive for shortness of breath. Negative for cough and chest tightness. Cardiovascular: Positive for leg swelling (chronic). Negative for chest pain and palpitations. Gastrointestinal: Negative for abdominal pain, nausea and vomiting.    Genitourinary: Negative for flank pain. Musculoskeletal: Negative for back pain. Neurological: Positive for dizziness, weakness and light-headedness. Hematological: Bruises/bleeds easily. Psychiatric/Behavioral: Negative for confusion. PHYSICAL EXAM   (up to 7 for level 4, 8 or more for level 5)    VITALS:   Vitals:    05/30/22 1630 05/30/22 1745   BP: (!) 109/93 116/85   Pulse: (!) 165 (!) 122   Resp: 22 23   Temp: 98 °F (36.7 °C)    TempSrc: Oral    SpO2: 99% 97%   Weight: 140 lb (63.5 kg)    Height: 5' 8\" (1.727 m)        Physical Exam  Vitals and nursing note reviewed. Constitutional:       General: She is not in acute distress. Appearance: She is well-developed. She is not diaphoretic. HENT:      Head: Normocephalic and atraumatic. Right Ear: External ear normal.      Left Ear: External ear normal.   Eyes:      Conjunctiva/sclera: Conjunctivae normal.   Cardiovascular:      Rate and Rhythm: Tachycardia present. Rhythm irregularly irregular. Heart sounds: Normal heart sounds. No murmur heard. Pulmonary:      Effort: Pulmonary effort is normal. No respiratory distress. Breath sounds: Normal breath sounds. No wheezing or rales. Abdominal:      General: There is no distension. Palpations: Abdomen is soft. Tenderness: There is no abdominal tenderness. Musculoskeletal:         General: Normal range of motion. Cervical back: Normal range of motion. Right lower leg: Edema present. Left lower leg: Edema present. Skin:     General: Skin is warm and dry. Coloration: Skin is not pale. Neurological:      General: No focal deficit present. Mental Status: She is alert.    Psychiatric:         Behavior: Behavior normal.         DIFFERENTIAL  DIAGNOSIS   PLAN (LABS / IMAGING / EKG):  Orders Placed This Encounter   Procedures    XR CHEST 1 VIEW    CBC with Auto Differential    Comprehensive Metabolic Panel w/ Reflex to MG    TSH w/reflex to FT4    Troponin    Brain Natriuretic Peptide    Telemetry monitoring - continuous duration    Inpatient consult to Cardiology    Inpatient consult to Hospitalist    EKG 12 Lead    Insert peripheral IV    ADMIT TO INPATIENT       MEDICATIONS ORDERED:  Orders Placed This Encounter   Medications    FOLLOWED BY Linked Order Group     amiodarone (CORDARONE) 150 mg in dextrose 5 % 100 mL bolus     amiodarone (CORDARONE) 450 mg in dextrose 5 % 250 mL infusion    metoprolol (LOPRESSOR) injection 2.5 mg     DIAGNOSTIC RESULTS / EMERGENCYDEPARTMENT COURSE / MDM   LABS:  Labs Reviewed   CBC WITH AUTO DIFFERENTIAL - Abnormal; Notable for the following components:       Result Value    Hemoglobin 10.8 (*)     Hematocrit 36.2 (*)     RDW 17.6 (*)     Seg Neutrophils 78 (*)     Lymphocytes 10 (*)     Monocytes 9 (*)     Immature Granulocytes 1 (*)     Absolute Lymph # 0.60 (*)     All other components within normal limits   COMPREHENSIVE METABOLIC PANEL W/ REFLEX TO MG FOR LOW K - Abnormal; Notable for the following components:    Glucose 123 (*)     BUN 30 (*)     CREATININE 1.28 (*)     Bun/Cre Ratio 23 (*)     GFR Non- 41 (*)     GFR  50 (*)     All other components within normal limits   TROPONIN - Abnormal; Notable for the following components:    Troponin, High Sensitivity 31 (*)     All other components within normal limits   BRAIN NATRIURETIC PEPTIDE - Abnormal; Notable for the following components:    Pro-BNP 9,616 (*)     All other components within normal limits   TSH WITH REFLEX       RADIOLOGY:  XR CHEST 1 VIEW    Result Date: 5/30/2022  EXAMINATION: ONE XRAY VIEW OF THE CHEST 5/30/2022 1:51 pm COMPARISON: 04/12/2022 HISTORY: ORDERING SYSTEM PROVIDED HISTORY: afib RVR TECHNOLOGIST PROVIDED HISTORY: afib RVR Reason for Exam: Shortness of breath, Atrial fibrillation FINDINGS: Cardiac size is enlarged. No acute infiltrates are seen . The pulmonary vascularity is hazy and indistinct.  No pneumothorax. No pleural effusions identified. Degenerative changes of the shoulders. Stable cardiomegaly. Mild pulmonary vascular congestion. EKG    EKG Interpretation    Interpreted by emergency department physician    Rhythm: atrial fibrillation - rapid  Rate: 140-150  Axis: normal  Ectopy: none  Conduction: normal  ST Segments: no acute change  T Waves: inversion in  lateral leads  Q Waves: none    Clinical Impression: non-specific EKG    All EKG's are interpreted by the Emergency Department Physician whoeither signs or Co-signs this chart in the absence of a cardiologist.    EMERGENCY DEPARTMENT COURSE:  ED Course as of 05/30/22 1800   Mon May 30, 2022   1711 CBC with Auto Differential(!):    WBC 6.0   RBC 4.00   Hemoglobin Quant 10.8(!)   Hematocrit 36.2(!)   MCV 90.5   MCH 27.0   MCHC 29.8   RDW 17.6(!)   Platelet Count 860   MPV 9.9   Seg Neutrophils PENDING   Lymphocytes PENDING   Monocytes PENDING   Eosinophils % PENDING   Basophils PENDING   Immature Granulocytes PENDING   Segs Absolute PENDING   Absolute Lymph # PENDING   Absolute Mono # PENDING   Absolute Eos # PENDING   Basophils Absolute PENDING   Absolute Immature Granulocyte PENDING [AO]   1711 XR CHEST 1 VIEW [AO]   1717 Comprehensive Metabolic Panel w/ Reflex to MG(!):    GLUCOSE, FASTING,(!)   BUN,BUNPL 30(!)   Creatinine 1.28(!)   Bun/Cre Ratio 23(!)   CALCIUM, SERUM, 534975 9.6   Sodium 141   Potassium 4.0   Chloride 102   CO2 26   Anion Gap 13   Alk Phos 101   ALT 19   AST 18   Bilirubin 0.58   Total Protein 7.2   Albumin 4.1   GFR Non- 41(!)   GFR  50(!)   GFR Comment      [AO]   1729 Pro-BNP(!): 9,616 [AO]   1729 Troponin, High Sensitivity(!): 31 [AO]   1729 TSH: 2.11 [AO]   1745 Intermed accepted  [AO]   1755 Dr. Ramon Duque 2.5mg lopressor push okay as needed as long as SBP >100.  If pressures drop below and she needs further management please call  [AO]      ED Course User Index  [AO] Colette Ameena Mckenzie DO       [facility]  Number of Diagnoses or Management Options     Amount and/or Complexity of Data Reviewed  Clinical lab tests: ordered and reviewed  Tests in the radiology section of CPT®: ordered and reviewed  Review and summarize past medical records: yes  Discuss the patient with other providers: yes  Independent visualization of images, tracings, or specimens: yes    Critical Care  Total time providing critical care: < 30 minutes    Patient Progress  Patient progress: stable      PROCEDURES:  Procedures     CONSULTS:  IP CONSULT TO CARDIOLOGY  IP CONSULT TO HOSPITALIST    CRITICAL CARE:  There was significant risk of life threatening deterioration of patient's condition requiring my direct management. Critical care time 24 minutes, excluding any documented procedures. FINAL IMPRESSION     1. Atrial fibrillation with RVR ([facility] [address]    2. DELIA (acute kidney injury) ([facility] [address]    3. Elevated brain natriuretic peptide (BNP) level       DISPOSITION / PLAN   DISPOSITION Admitted 05/30/2022 05:54:28 PM    ADMIT    Colette Mcduffie DO  Emergency Medicine Physician    (Please note that portions of this note were completed with a voice recognition program.  Efforts were made to edit the dictations but occasionally words are mis-transcribed.)        Chet Mesa DO  05/30/22 1800

## 2022-05-30 NOTE — H&P
[facility]  Office: [address], DO, Martha Osorio, DO, You Shown, DO, [employer] [facility], DO, Simon Abrams MD, Mc Veronica MD, Michoacano Dolan MD, Lucille Soto MD, Ced Pat MD, Ilan Stokes MD, Raya Silva MD, Noy Nogueira, DO, Miryam Castillo, DO, Jesi Edwards MD,  Saul Chambers, DO, Bjorn Fiore MD, Melvern Essex, MD, Christa Manning MD, Hemant Valdes, DO, Esa Gayle MD, Rhonda Lucio MD, Gian Reese MD, Steffi Lucio [facility], [facility], [facility], Alberto Farrell, [facility], Gwen Muhammad, CNP, Rashel Ruff, CNP, Madisyn Orta, CNP, France Mcclelland PA-C, Javi Bryson [facility], Joe Hoang, CNP, Misty Coyne, CNP, Moni Hunter, CNP, Shivam Chase, CNS, Marcia Del Toro, [facility], Lorna Dodge, CNP, Bulmaro Ordoñez, CNP, Wyatt Gurrola, [facility]    HISTORY AND PHYSICAL EXAMINATION            Date:   5/30/2022  Patient name:  Edita Deluca  Date of admission:  5/30/2022  4:35 PM  MRN:   [id number]  Account:  [de-identified]  YOB: 1950  PCP:    Taina Rodriguez  Room:   2042/2042-01  Code Status:    Full Code    Chief Complaint:     Chief Complaint   Patient presents with    Shortness of Breath     Pt reports SOB for several weeks; hx of afib and needs ablation; pt reports she hasn't been able to get an appointment in over a month; diagnosed with Covid on 5/10     Atrial Fibrillation     History Obtained From:     patient    History of Present Illness:     Patient presents to the emergency room today with complaints of feeling short of breath for several weeks and being in atrial fibrillation. The patient states that when she woke up this morning at 0200 she was having pressure in her ears, which means that she has fluid in her lungs. She states that she then got up, took some lasix, a cough medicine and sat up on the love seat.  Throughout the day she continued to not feel well, was short of breath, and decided to come in to the emergency room for evaluation. Patient states that her A-fib causes her to be short of breath and she feels palpitations. She states that she has had a-fib \"for a long time\" and had an ablation in November, 2021 with Dr. Johanna Aguirre. The patient is upset now, due to \"not being able to get in to see the cardiologist's office to get shocked for weeks\". Patient denies any fevers, chills, chest pain, leg swelling, nausea, vomiting, lightheadedness, and cough. Patient states that her only past medical history is A-fib and arthritis. Past Medical History:     Past Medical History:   Diagnosis Date    Anxiety     Arthritis     RA    Asthmatic bronchitis, unspecified asthma severity, uncomplicated 34/[id number]/[id number]    Atrial fibrillation (HCC)     not recurrent    Dyspnea on exertion 05/09/2018    GERD (gastroesophageal reflux disease)     Hyperlipidemia     diet controlled    Hypertension     Mitral valve disorder     Moderate to severe pulmonary hypertension ([address] 05/09/2018    Osteoarthritis     Palpitation     Physical deconditioning     Shortness of breath     with excertion work up is negative    Sleep apnea     not on cpap    Spinal stenosis at L4-L5 level     Stage 3b chronic kidney disease ([address] 2020    Tachycardia     Tricuspid regurgitation     mild        Past Surgical History:     Past Surgical History:   Procedure Laterality Date    CARDIAC CATHETERIZATION  05/19/2014    CARDIAC CATHETERIZATION  01/20/2016    CARDIAC SURGERY  11/01/2021    ablation for a-fib    COLONOSCOPY      ENDOSCOPY, COLON, DIAGNOSTIC      JOINT REPLACEMENT Right     total knee    JOINT REPLACEMENT Left     total knee    KNEE JOINT MANIPULATION Right     SPINE SURGERY  07/2017    lumbar fusion        Medications Prior to Admission:     Prior to Admission medications    Medication Sig Start Date End Date Taking?  Authorizing Provider   lisinopril (PRINIVIL;ZESTRIL) 2.5 MG tablet Take 1 tablet by mouth daily 22   Vandana Miles, DO   furosemide (LASIX) 20 MG tablet Take 1 tablet by mouth daily 22   Vandana Miles DO   gabapentin (NEURONTIN) 100 MG capsule Take 1 capsule by mouth 3 times daily for 30 days. 3/9/22 4/8/22  De Siu MD   XARELTO 20 MG TABS tablet Take 20 mg by mouth Daily with supper  21   Historical Provider, MD   Multiple Vitamins-Minerals (THERAPEUTIC MULTIVITAMIN-MINERALS) tablet Take 1 tablet by mouth daily    Historical Provider, MD   Handicap Placard MISC by Does not apply route  5 years from origninal written date 2022    Dx: Osteoarthritis unable to ambulate over 150ft 17   De Siu MD   aspirin 81 MG tablet Take 81 mg by mouth daily. Historical Provider, MD        Allergies:     Patient has no known allergies. Social History:     Tobacco:    reports that she has never smoked. She has never used smokeless tobacco.  Alcohol:      reports current alcohol use of about 5.0 standard drinks of alcohol per week. Drug Use:  reports no history of drug use. Family History:     Family History   Problem Relation Age of Onset    High Blood Pressure Mother     Arthritis Mother     Diabetes Father     Arthritis Sister        Review of Systems:     Positive and Negative as described in HPI. Review of Systems   Constitutional: Positive for fatigue. Negative for activity change, chills and fever. Respiratory: Positive for shortness of breath. Negative for cough and chest tightness. Cardiovascular: Positive for palpitations. Negative for chest pain and leg swelling. Gastrointestinal: Negative for abdominal pain, constipation, diarrhea, nausea and vomiting. Endocrine: Negative for cold intolerance and polyuria. Genitourinary: Negative for difficulty urinating. Musculoskeletal: Positive for arthralgias. Negative for myalgias. Skin: Negative. Negative for wound.    Neurological: Negative for dizziness, weakness, light-headedness and numbness. Psychiatric/Behavioral: Negative for confusion. Physical Exam:   /81   Pulse (!) 120   Temp 98.3 °F (36.8 °C) (Temporal)   Resp 24   Ht 5' 8\" (1.727 m)   Wt 132 lb 14.4 oz (60.3 kg)   SpO2 97%   BMI 20.21 kg/m²   Temp (24hrs), Av.2 °F (36.8 °C), Min:98 °F (36.7 °C), Max:98.3 °F (36.8 °C)    No results for input(s): POCGLU in the last 72 hours. No intake or output data in the 24 hours ending 22    Physical Exam  Vitals and nursing note reviewed. Constitutional:       General: She is not in acute distress. Appearance: Normal appearance. She is normal weight. HENT:      Head: Normocephalic. Eyes:      Pupils: Pupils are equal, round, and reactive to light. Cardiovascular:      Rate and Rhythm: Tachycardia present. Rhythm irregular. Pulses: Normal pulses. Heart sounds: Normal heart sounds. No murmur heard. No friction rub. No gallop. Pulmonary:      Effort: Pulmonary effort is normal. No respiratory distress. Breath sounds: Examination of the left-lower field reveals rales. Rales present. Abdominal:      General: Bowel sounds are normal. There is no distension. Palpations: Abdomen is soft. Tenderness: There is no abdominal tenderness. Musculoskeletal:         General: Normal range of motion. Cervical back: Normal range of motion. Right lower leg: Edema (2+) present. Skin:     General: Skin is warm and dry. Capillary Refill: Capillary refill takes less than 2 seconds. Neurological:      General: No focal deficit present. Mental Status: She is alert and oriented to person, place, and time. Motor: No weakness. Psychiatric:         Mood and Affect: Mood normal.         Behavior: Behavior normal.         Thought Content:  Thought content normal.         Judgment: Judgment normal.       Investigations:      Laboratory Testing:  Recent Results (from the past 24 hour(s))   EKG 12 Lead    Collection Time: 05/30/22  4:31 PM   Result Value Ref Range    Ventricular Rate 149 BPM    QRS Duration 84 ms    Q-T Interval 252 ms    QTc Calculation (Bazett) 396 ms    R Axis 29 degrees    T Axis -141 degrees   CBC with Auto Differential    Collection Time: 05/30/22  4:46 PM   Result Value Ref Range    WBC 6.0 3.5 - 11.3 k/uL    RBC 4.00 3.95 - 5.11 m/uL    Hemoglobin 10.8 (L) 11.9 - 15.1 g/dL    Hematocrit 36.2 (L) 36.3 - 47.1 %    MCV 90.5 82.6 - 102.9 fL    MCH 27.0 25.2 - 33.5 pg    MCHC 29.8 28.4 - 34.8 g/dL    RDW 17.6 (H) 11.8 - 14.4 %    Platelets 655 026 - 690 k/uL    MPV 9.9 8.1 - 13.5 fL    Seg Neutrophils 78 (H) 36 - 66 %    Lymphocytes 10 (L) 24 - 44 %    Monocytes 9 (H) 1 - 7 %    Eosinophils % 1 1 - 4 %    Basophils 1 %    Immature Granulocytes 1 (H) 0 %    Segs Absolute 4.68 1.8 - 7.7 k/uL    Absolute Lymph # 0.60 (L) 1.0 - 4.8 k/uL    Absolute Mono # 0.54 0.2 - 0.8 k/uL    Absolute Eos # 0.06 0.0 - 0.4 k/uL    Basophils Absolute 0.06 0.0 - 0.2 k/uL    Absolute Immature Granulocyte 0.06 0.00 - 0.30 k/uL   Comprehensive Metabolic Panel w/ Reflex to MG    Collection Time: 05/30/22  4:46 PM   Result Value Ref Range    Glucose 123 (H) 70 - 99 mg/dL    BUN 30 (H) 8 - 23 mg/dL    CREATININE 1.28 (H) 0.50 - 0.90 mg/dL    Bun/Cre Ratio 23 (H) 9 - 20    Calcium 9.6 8.6 - 10.4 mg/dL    Sodium 141 135 - 144 mmol/L    Potassium 4.0 3.7 - 5.3 mmol/L    Chloride 102 98 - 107 mmol/L    CO2 26 20 - 31 mmol/L    Anion Gap 13 9 - 17 mmol/L    Alkaline Phosphatase 101 35 - 104 U/L    ALT 19 5 - 33 U/L    AST 18 <32 U/L    Total Bilirubin 0.58 0.3 - 1.2 mg/dL    Total Protein 7.2 6.4 - 8.3 g/dL    Albumin 4.1 3.5 - 5.2 g/dL    GFR Non- 41 (L) >60 mL/min    GFR African American 50 (L) >60 mL/min    GFR Comment         TSH w/reflex to FT4    Collection Time: 05/30/22  4:46 PM   Result Value Ref Range    TSH 2.11 0.30 - 5.00 uIU/mL   Troponin    Collection Time: 05/30/22  4:46 PM   Result Value Ref Range    Troponin, High Sensitivity 31 (H) 0 - 14 ng/L   Brain Natriuretic Peptide    Collection Time: 05/30/22  4:46 PM   Result Value Ref Range    Pro-BNP 9,616 (H) <300 pg/mL       Imaging/Diagnostics:  XR CHEST 1 VIEW    Result Date: 5/30/2022  Stable cardiomegaly. Mild pulmonary vascular congestion. Assessment :      Hospital Problems           Last Modified POA    * (Principal) Atrial fibrillation with rapid ventricular response ([facility] 5/30/2022 Yes    Chronic combined systolic and diastolic CHF (congestive heart failure) ([facility] 5/30/2022 Yes    Acute kidney injury superimposed on CKD ([facility] 5/30/2022 Yes    Essential hypertension 5/30/2022 Yes          Plan:     Patient status inpatient in the  Progressive Unit/Step down    1. A-fib with RVR  1. Continue amiodarone drip that was started in ED  2. Continue Xarelto BID  3. Neuro checks  4. Consult cardiology  2. Heart failure  1. Resume home medications  2. Last ECHO 4/13/22  3. DELIA superimposed on CKD  1. Monitor AM labs  2. Bladder scan  3. Kidney US  4. Urine studies  4. Hypertension  1. Continue home medications with holding parameters  5. Adult diet    Consultations:   IP CONSULT TO CARDIOLOGY  IP CONSULT TO HOSPITALIST    Patient is admitted as inpatient status because of co-morbidities listed above, severity of signs and symptoms as outlined, requirement for current medical therapies and most importantly because of direct risk to patient if care not provided in a hospital setting. Expected length of stay > 48 hours. On this date 5/30/2022 I have spent 32 minutes reviewing previous notes, test results and face to face with the patient discussing the diagnosis and importance of compliance with the treatment plan as well as documenting on the day of the visit. At least 50% of the time documented was spent with the patient to provide counseling and/or coordination of care.       NITESH Bass CNP  5/30/2022  7:10 PM    Copy sent to Dr. Tracie Engle

## 2022-05-31 ENCOUNTER — APPOINTMENT (OUTPATIENT)
Dept: ULTRASOUND IMAGING | Age: 72
DRG: 309 | End: 2022-05-31
Payer: MEDICARE

## 2022-05-31 LAB
ABSOLUTE EOS #: 0.12 K/UL (ref 0–0.4)
ABSOLUTE IMMATURE GRANULOCYTE: 0.06 K/UL (ref 0–0.3)
ABSOLUTE LYMPH #: 0.59 K/UL (ref 1–4.8)
ABSOLUTE MONO #: 0.47 K/UL (ref 0.2–0.8)
ALBUMIN SERPL-MCNC: 4 G/DL (ref 3.5–5.2)
ALP BLD-CCNC: 121 U/L (ref 35–104)
ALT SERPL-CCNC: 25 U/L (ref 5–33)
ANION GAP SERPL CALCULATED.3IONS-SCNC: 14 MMOL/L (ref 9–17)
AST SERPL-CCNC: 24 U/L
BASOPHILS # BLD: 1 %
BASOPHILS ABSOLUTE: 0.06 K/UL (ref 0–0.2)
BILIRUB SERPL-MCNC: 0.56 MG/DL (ref 0.3–1.2)
BUN BLDV-MCNC: 28 MG/DL (ref 8–23)
BUN/CREAT BLD: 20 (ref 9–20)
CALCIUM SERPL-MCNC: 9.5 MG/DL (ref 8.6–10.4)
CHLORIDE BLD-SCNC: 101 MMOL/L (ref 98–107)
CO2: 23 MMOL/L (ref 20–31)
CREAT SERPL-MCNC: 1.4 MG/DL (ref 0.5–0.9)
CREATININE URINE: 91.1 MG/DL (ref 28–217)
EOSINOPHIL,URINE: NORMAL
EOSINOPHILS RELATIVE PERCENT: 2 % (ref 1–4)
FERRITIN: 143 NG/ML (ref 13–150)
FOLATE: 13.6 NG/ML
GFR AFRICAN AMERICAN: 45 ML/MIN
GFR NON-AFRICAN AMERICAN: 37 ML/MIN
GFR SERPL CREATININE-BSD FRML MDRD: ABNORMAL ML/MIN/{1.73_M2}
GLUCOSE BLD-MCNC: 132 MG/DL (ref 70–99)
HCT VFR BLD CALC: 36.3 % (ref 36.3–47.1)
HEMOGLOBIN: 10.7 G/DL (ref 11.9–15.1)
IMMATURE GRANULOCYTES: 1 %
IRON SATURATION: 19 % (ref 20–55)
IRON: 45 UG/DL (ref 37–145)
LYMPHOCYTES # BLD: 10 % (ref 24–44)
MAGNESIUM: 2 MG/DL (ref 1.6–2.6)
MCH RBC QN AUTO: 26.5 PG (ref 25.2–33.5)
MCHC RBC AUTO-ENTMCNC: 29.5 G/DL (ref 28.4–34.8)
MCV RBC AUTO: 89.9 FL (ref 82.6–102.9)
MONOCYTES # BLD: 8 % (ref 1–7)
OSMOLALITY URINE: 524 MOSM/KG (ref 300–1170)
PDW BLD-RTO: 17.6 % (ref 11.8–14.4)
PLATELET # BLD: 253 K/UL (ref 138–453)
PMV BLD AUTO: 10 FL (ref 8.1–13.5)
POTASSIUM SERPL-SCNC: 4.1 MMOL/L (ref 3.7–5.3)
PROTEIN UA: NEGATIVE
RBC # BLD: 4.04 M/UL (ref 3.95–5.11)
SEG NEUTROPHILS: 78 % (ref 36–66)
SEGMENTED NEUTROPHILS ABSOLUTE COUNT: 4.6 K/UL (ref 1.8–7.7)
SODIUM BLD-SCNC: 138 MMOL/L (ref 135–144)
SODIUM,UR: 75 MMOL/L
TOTAL IRON BINDING CAPACITY: 240 UG/DL (ref 250–450)
TOTAL PROTEIN: 6.9 G/DL (ref 6.4–8.3)
UNSATURATED IRON BINDING CAPACITY: 195 UG/DL (ref 112–347)
VITAMIN B-12: 791 PG/ML (ref 232–1245)
WBC # BLD: 5.9 K/UL (ref 3.5–11.3)

## 2022-05-31 PROCEDURE — 82607 VITAMIN B-12: CPT

## 2022-05-31 PROCEDURE — 83550 IRON BINDING TEST: CPT

## 2022-05-31 PROCEDURE — 99232 SBSQ HOSP IP/OBS MODERATE 35: CPT | Performed by: STUDENT IN AN ORGANIZED HEALTH CARE EDUCATION/TRAINING PROGRAM

## 2022-05-31 PROCEDURE — 84166 PROTEIN E-PHORESIS/URINE/CSF: CPT

## 2022-05-31 PROCEDURE — 36415 COLL VENOUS BLD VENIPUNCTURE: CPT

## 2022-05-31 PROCEDURE — 87205 SMEAR GRAM STAIN: CPT

## 2022-05-31 PROCEDURE — 82746 ASSAY OF FOLIC ACID SERUM: CPT

## 2022-05-31 PROCEDURE — 2580000003 HC RX 258: Performed by: NURSE PRACTITIONER

## 2022-05-31 PROCEDURE — 85025 COMPLETE CBC W/AUTO DIFF WBC: CPT

## 2022-05-31 PROCEDURE — 6370000000 HC RX 637 (ALT 250 FOR IP): Performed by: STUDENT IN AN ORGANIZED HEALTH CARE EDUCATION/TRAINING PROGRAM

## 2022-05-31 PROCEDURE — 83935 ASSAY OF URINE OSMOLALITY: CPT

## 2022-05-31 PROCEDURE — 2060000000 HC ICU INTERMEDIATE R&B

## 2022-05-31 PROCEDURE — 84300 ASSAY OF URINE SODIUM: CPT

## 2022-05-31 PROCEDURE — 6370000000 HC RX 637 (ALT 250 FOR IP): Performed by: NURSE PRACTITIONER

## 2022-05-31 PROCEDURE — 82570 ASSAY OF URINE CREATININE: CPT

## 2022-05-31 PROCEDURE — 81003 URINALYSIS AUTO W/O SCOPE: CPT

## 2022-05-31 PROCEDURE — 80053 COMPREHEN METABOLIC PANEL: CPT

## 2022-05-31 PROCEDURE — 83540 ASSAY OF IRON: CPT

## 2022-05-31 PROCEDURE — 82728 ASSAY OF FERRITIN: CPT

## 2022-05-31 PROCEDURE — 83735 ASSAY OF MAGNESIUM: CPT

## 2022-05-31 PROCEDURE — 6360000002 HC RX W HCPCS: Performed by: NURSE PRACTITIONER

## 2022-05-31 PROCEDURE — 84156 ASSAY OF PROTEIN URINE: CPT

## 2022-05-31 PROCEDURE — 94761 N-INVAS EAR/PLS OXIMETRY MLT: CPT

## 2022-05-31 RX ORDER — METOPROLOL SUCCINATE 25 MG/1
25 TABLET, EXTENDED RELEASE ORAL DAILY
COMMUNITY

## 2022-05-31 RX ORDER — CEFUROXIME AXETIL 500 MG/1
500 TABLET ORAL 2 TIMES DAILY
Status: ON HOLD | COMMUNITY
End: 2022-06-03 | Stop reason: HOSPADM

## 2022-05-31 RX ORDER — BENZONATATE 100 MG/1
100 CAPSULE ORAL 3 TIMES DAILY PRN
Status: DISCONTINUED | OUTPATIENT
Start: 2022-05-31 | End: 2022-06-03 | Stop reason: HOSPADM

## 2022-05-31 RX ORDER — BENZONATATE 100 MG/1
100 CAPSULE ORAL 3 TIMES DAILY PRN
COMMUNITY

## 2022-05-31 RX ADMIN — FUROSEMIDE 20 MG: 20 TABLET ORAL at 08:03

## 2022-05-31 RX ADMIN — ASPIRIN 81 MG: 81 TABLET, COATED ORAL at 08:03

## 2022-05-31 RX ADMIN — BENZONATATE 100 MG: 100 CAPSULE ORAL at 21:00

## 2022-05-31 RX ADMIN — SODIUM CHLORIDE, PRESERVATIVE FREE 10 ML: 5 INJECTION INTRAVENOUS at 08:03

## 2022-05-31 RX ADMIN — AMIODARONE HYDROCHLORIDE 0.5 MG/MIN: 50 INJECTION, SOLUTION INTRAVENOUS at 16:01

## 2022-05-31 RX ADMIN — METOPROLOL TARTRATE 25 MG: 25 TABLET, FILM COATED ORAL at 20:43

## 2022-05-31 RX ADMIN — SODIUM CHLORIDE, PRESERVATIVE FREE 10 ML: 5 INJECTION INTRAVENOUS at 20:43

## 2022-05-31 RX ADMIN — METOPROLOL TARTRATE 25 MG: 25 TABLET, FILM COATED ORAL at 09:32

## 2022-05-31 RX ADMIN — RIVAROXABAN 20 MG: 20 TABLET, FILM COATED ORAL at 17:16

## 2022-05-31 RX ADMIN — LISINOPRIL 2.5 MG: 2.5 TABLET ORAL at 08:03

## 2022-05-31 NOTE — CONSULTS
Reason for Consult:  Afib with RVR  Requesting Physician: Jayesh De La O MD    CHIEF COMPLAINT:  SOB/Palpitations    History Obtained From:  Patient/Chart    HISTORY OF PRESENT ILLNESS:      The patient is a 67 y.o. female with significant past medical history of Paroxysmal Atrial Fibrillation (s/p Ablation Nov 2021, On xarelto), HFrEF (40%), Mild-Mod MR, Pulmonary Hypertension, Asthma, RA, HLD, JOSE (not on CPAP), HTN. She presents with SOB/palpitations over the past few weeks. More recently, she had been having pressure in her ears and a cough and malaise so came to the ER for evaluation. She had been seen in clinic and planned for a cardioversion for afib but couldn't get it scheduled. She has been on sotalol in the past but failed it due to dizziness. She reports taking her Xarelto every day for the past 3 weeks, past that she isn't sure if she missed a dose or two every now and again. She has cough that is worse on laying flat. She had been following with nephrology for renal disease    In the ER, BP was 111/94 with . Labs showed Creat 1.28 (baseline 0.88), Pro-BNP 9600, trops 31, TSH 2.11, Hb 10.8. EKG showed afib with RVR. She was given Metoprolol 2.5mg IV due to mild hypotension and started on an amiodarone drip. CXR showed mild vascular congestion. Currently, she is feeling well. Not on O2. Cough is stable. She is frustrated that her Afib is back. She denies any dizziness, CP. She is on amio drip and doing ok with HR in the 110-120 range.        Past Medical History:    Past Medical History:   Diagnosis Date    Anxiety     Arthritis     RA    Asthmatic bronchitis, unspecified asthma severity, uncomplicated 99/24/3539    Atrial fibrillation (HCC)     not recurrent    Dyspnea on exertion 05/09/2018    GERD (gastroesophageal reflux disease)     Hyperlipidemia     diet controlled    Hypertension     Mitral valve disorder     Moderate to severe pulmonary hypertension ([address] 2018    Osteoarthritis     Palpitation     Physical deconditioning     Shortness of breath     with excertion work up is negative    Sleep apnea     not on cpap    Spinal stenosis at L4-L5 level     Stage 3b chronic kidney disease (HCC) 2020    Tachycardia     Tricuspid regurgitation     mild     Past Surgical History:    Past Surgical History:   Procedure Laterality Date    CARDIAC CATHETERIZATION  2014    CARDIAC CATHETERIZATION  2016    CARDIAC SURGERY  2021    ablation for a-fib    COLONOSCOPY      ENDOSCOPY, COLON, DIAGNOSTIC      JOINT REPLACEMENT Right     total knee    JOINT REPLACEMENT Left     total knee    KNEE JOINT MANIPULATION Right     SPINE SURGERY  2017    lumbar fusion     Home Medications:  Prior to Admission medications    Medication Sig Start Date End Date Taking? Authorizing Provider   lisinopril (PRINIVIL;ZESTRIL) 2.5 MG tablet Take 1 tablet by mouth daily 22   Annalise Miles, DO   furosemide (LASIX) 20 MG tablet Take 1 tablet by mouth daily 22   Annalise Razalop, DO   gabapentin (NEURONTIN) 100 MG capsule Take 1 capsule by mouth 3 times daily for 30 days. 3/9/22 4/8/22  Luisa Minaya MD   XARELTO 20 MG TABS tablet Take 20 mg by mouth Daily with supper  21   Historical Provider, MD   Multiple Vitamins-Minerals (THERAPEUTIC MULTIVITAMIN-MINERALS) tablet Take 1 tablet by mouth daily    Historical Provider, MD   Handicap Placard MISC by Does not apply route  5 years from origninal written date 2022    Dx: Osteoarthritis unable to ambulate over 150ft 17   Luisa Minaya MD   aspirin 81 MG tablet Take 81 mg by mouth daily.     Historical Provider, MD     Current Medications:    Current Facility-Administered Medications   Medication Dose Route Frequency Provider Last Rate Last Admin    metoprolol tartrate (LOPRESSOR) tablet 25 mg  25 mg Oral BID Tanvir Stevens MD        aspirin EC tablet 81 mg  81 mg Oral Daily [facility], APRN - CNP   81 mg at 05/31/22 9590    furosemide (LASIX) tablet 20 mg  20 mg Oral Daily [facility], APRN - CNP   20 mg at 05/31/22 0803    lisinopril (PRINIVIL;ZESTRIL) tablet 2.5 mg  2.5 mg Oral Daily [facility], APRN - CNP   2.5 mg at 05/31/22 4443    therapeutic multivitamin-minerals 1 tablet  1 tablet Oral Daily [facility], APRN - CNP   1 tablet at 05/30/22 2018    rivaroxaban (XARELTO) tablet 20 mg  20 mg Oral Dinner [facility], APRN - CNP   20 mg at 05/30/22 2018    sodium chloride flush 0.9 % injection 5-40 mL  5-40 mL IntraVENous 2 times per day [facility], APRN - CNP   10 mL at 05/31/22 0348    sodium chloride flush 0.9 % injection 10 mL  10 mL IntraVENous PRN [facility], APRN - CNP        0.9 % sodium chloride infusion   IntraVENous PRN [facility], APRN - CNP        ondansetron (ZOFRAN-ODT) disintegrating tablet 4 mg  4 mg Oral Q8H PRN [facility], APRN - CNP        Or    ondansetron (ZOFRAN) injection 4 mg  4 mg IntraVENous Q6H PRN [facility], APRN - CNP        polyethylene glycol (GLYCOLAX) packet 17 g  17 g Oral Daily PRN [facility], APRN - CNP        acetaminophen (TYLENOL) tablet 650 mg  650 mg Oral Q6H PRN [facility], APRN - CNP        Or    acetaminophen (TYLENOL) suppository 650 mg  650 mg Rectal Q6H PRN [facility], APRN - CNP        amiodarone (CORDARONE) 450 mg in dextrose 5 % 250 mL infusion  0.5 mg/min IntraVENous Continuous [facility], APRN - CNP 16.7 mL/hr at 05/30/22 2315 0.5 mg/min at 05/30/22 2315    potassium chloride (KLOR-CON M) extended release tablet 40 mEq  40 mEq Oral PRN [facility], APRN - CNP        Or    potassium bicarb-citric acid (EFFER-K) effervescent tablet 40 mEq  40 mEq Oral PRN [facility], APRN - CNP        Or    potassium chloride 10 mEq/100 mL IVPB (Peripheral Line)  10 mEq IntraVENous PRN [facility], APRN - CNP        magnesium sulfate 1000 mg in dextrose 5% 100 mL IVPB  1,000 mg IntraVENous PRN NITESH Call - CNP         Allergies:  Patient has no known allergies. Social History:    Social History     Socioeconomic History    Marital status: Single     Spouse name: Not on file    Number of children: Not on file    Years of education: Not on file    Highest education level: Not on file   Occupational History    Occupation: dog    Tobacco Use    Smoking status: Never Smoker    Smokeless tobacco: Never Used   Substance and Sexual Activity    Alcohol use: Yes     Alcohol/week: 5.0 standard drinks     Types: 5 Cans of beer per week     Comment: weekly    Drug use: No    Sexual activity: Never   Other Topics Concern    Not on file   Social History Narrative    Not on file     Social Determinants of Health     Financial Resource Strain: Low Risk     Difficulty of Paying Living Expenses: Not hard at all   Food Insecurity: No Food Insecurity    Worried About Running Out of Food in the Last Year: Never true    Mai of Food in the Last Year: Never true   Transportation Needs: No Transportation Needs    Lack of Transportation (Medical): No    Lack of Transportation (Non-Medical):  No   Physical Activity:     Days of Exercise per Week: Not on file    Minutes of Exercise per Session: Not on file   Stress:     Feeling of Stress : Not on file   Social Connections:     Frequency of Communication with Friends and Family: Not on file    Frequency of Social Gatherings with Friends and Family: Not on file    Attends Sikh Services: Not on file    Active Member of Clubs or Organizations: Not on file    Attends Club or Organization Meetings: Not on file    Marital Status: Not on file   Intimate Partner Violence:     Fear of Current or Ex-Partner: Not on file    Emotionally Abused: Not on file    Physically Abused: Not on file    Sexually Abused: Not on file   Housing Stability:     Unable to Pay for Housing in the Last Year: Not on file    Number of Places Lived in the Last Year: Not on file    Unstable Housing in the Last Year: Not on file     Family History:   Family History   Problem Relation Age of Onset    High Blood Pressure Mother     Arthritis Mother     Diabetes Father     Arthritis Sister        · REVIEW OF SYSTEMS   CONSTITUTIONAL: positive for  malaise  EYES:  negative  HEENT:  positive for  cough  RESPIRATORY: positive for  dry cough and dyspnea   CARDIOVASCULAR:  positive for  Palpitations, orthopnea  GASTROINTESTINAL:  negative  GENITOURINARY:  negative  MUSCULOSKELETAL:  negative  NEUROLOGICAL:  negative  BEHAVIOR/PSYCH:  negative    PHYSICAL EXAM:    Vitals:    VITALS:  BP (!) 140/100   Pulse (!) 127   Temp (!) 96.1 °F (35.6 °C) (Temporal)   Resp 20   Ht 5' 8\" (1.727 m)   Wt 132 lb (59.9 kg)   SpO2 96%   BMI 20.07 kg/m²   24HR INTAKE/OUTPUT:      Intake/Output Summary (Last 24 hours) at 5/31/2022 0817  Last data filed at 5/31/2022 0600  Gross per 24 hour   Intake 370.12 ml   Output 500 ml   Net -129.88 ml       CONSTITUTIONAL:  awake, alert, cooperative, no apparent distress, and appears stated age  EYES: Pupils equal, round and reactive to light, conjunctiva normal  ENT:  normocepalic, without obvious abnormality  NECK:  supple, symmetrical, trachea midline, no carotid bruit, No  JVD  LUNGS: Non-labored, good air exchange, clear to auscultation bilaterally, no crackles or wheezing  CARDIOVASCULAR:  Normal apical impulse, irregular rate and rhythm, normal S1 and S2, no S3 or S4, and no murmur noted, no rub. ABDOMEN:  No scars, normal bowel sounds, soft, non-distended, non-tender, no masses palpated, no hepatosplenomegally, no bruit. MUSCULOSKELETAL:  there is no redness, warmth, or swelling of the joints No leg edema. NEUROLOGIC:  Awake, alert, oriented to name, place and time.   SKIN:  no bruising or bleeding    DATA:   ECG:  Afib with RVR      ECHO: 4/12/2022  Left ventricle is normal in size  Global left ventricular systolic function is moderately reduced  Estimated ejection fraction is 40 % . Left atrium is moderate to severely dilated. Right atrium is moderately dilated . Normal mitral valve structure and function. Mild to moderate mitral regurgitation. Multiple MR jets noted. Mild to moderate tricuspid regurgitation. Severe pulmonary hypertension. No pericardial effusion seen. Normal aortic root dimension.     Stress Test:  none  Angiography:  none    Cardiology Labs:  Recent Labs     05/30/22  1646   TROPHS 31*     Warfarin PT/INR:No results found for: PROTIME, INR, WARFARIN  CBC:  Lab Results   Component Value Date    WBC 6.0 05/30/2022    RBC 4.00 05/30/2022    HGB 10.8 05/30/2022    HCT 36.2 05/30/2022    MCV 90.5 05/30/2022    MCH 27.0 05/30/2022    MCHC 29.8 05/30/2022    RDW 17.6 05/30/2022     05/30/2022    MPV 9.9 05/30/2022     CMP:  Lab Results   Component Value Date     05/30/2022    K 4.0 05/30/2022     05/30/2022    CO2 26 05/30/2022    BUN 30 05/30/2022    CREATININE 1.28 05/30/2022    GFRAA 50 05/30/2022    LABGLOM 41 05/30/2022    GLUCOSE 123 05/30/2022    CALCIUM 9.6 05/30/2022     Magnesium:    Lab Results   Component Value Date    MG 2.0 05/30/2022     PTT:  No results found for: APTT, PTT  TSH:    Lab Results   Component Value Date    TSH 2.11 05/30/2022     BNP:   Recent Labs     05/30/22  1646   PROBNP 9,616*     BMP:  Lab Results   Component Value Date     05/30/2022    K 4.0 05/30/2022     05/30/2022    CO2 26 05/30/2022    BUN 30 05/30/2022    LABALBU 4.1 05/30/2022    CREATININE 1.28 05/30/2022    CALCIUM 9.6 05/30/2022    GFRAA 50 05/30/2022    LABGLOM 41 05/30/2022    GLUCOSE 123 05/30/2022     LIVER PROFILE:  Recent Labs     05/30/22  1646   AST 18   ALT 19   LABALBU 4.1   ALKPHOS 101   BILITOT 0.58   PROT 7.2     FLP:  No results found for: CHOL, TRIG, HDL, LDLCHOLESTEROL      IMPRESSION  · Atrial Fibrillation with RVR  Hx of recent ablation Nov 2021  On Xarelto. Not on rhythm control  · Acute on Chronic Systolic Heart Failure  EF 40% with PHTN on ECHO 4/2022  · Acute on Chronic Kidney Disease  · On chronic Anticoagulation (Xarelto)  · Hypertension  · Hyperlipidemia  · JOSE (not on CPAP)        RECOMMENDATIONS:     1. Cont Amio drip at 0.5  2. Metoprolol 5mg IV Q6 PRN for HR >120  3. Cont PO Metoprolol  4. Have sent a script for Multaq for costing as possible antiarrythmic on discharge  5. Cont PO lasix. Pt appears euvolemic on exam  6. Monitor renal function  7. NPO midnight  8. DCCV tomorrow at 1pm with anesthesia. Pt reports not missing any xarelto doses for the past 3 weeks  9.  Based on response, will determine medical therapy and likely monitor overnight prior to discharge    Discussed with patient and nursing      Electronically signed by Remy Ley MD on 5/31/2022 at 8:17 AM     CC: Rex Herrera

## 2022-05-31 NOTE — PROGRESS NOTES
Transitions of Care Pharmacy Service   Medication Review    The patient's list of current home medications has been reviewed. Source(s) of information: patient, Care Everywhere, Epic, [facility] refill report      PROVIDER ACTION REQUESTED  Medications that need to be addressed by a physician/nurse practitioner:    Medication Action Requested   Lisinopril 2.5mg     Not a current home med (was recently discontinued) -- please discontinue as appropriate, otherwise she will need a new prescription to resume at discharge           Please feel free to call me with any questions about this encounter. Thank you.     Jayesh Truong [facility]   Transitions of Care Pharmacy Service  Phone:  [phone]  Fax: [phone]      Electronically signed by Jayesh Truong [facility] on 5/31/2022 at 5:44 PM           Medications Prior to Admission:   benzonatate (TESSALON) 100 MG capsule, Take 100 mg by mouth 3 times daily as needed for Cough  metoprolol succinate (TOPROL XL) 25 MG extended release tablet, Take 25 mg by mouth daily  vitamin D (CHOLECALCIFEROL) 25 MCG (1000 UT) TABS tablet, Take 1,000 Units by mouth daily  Cyanocobalamin (VITAMIN B12 PO), Take 1 tablet by mouth daily  cefUROXime (CEFTIN) 500 MG tablet, Take 500 mg by mouth 2 times daily  furosemide (LASIX) 20 MG tablet, Take 1 tablet by mouth daily  XARELTO 20 MG TABS tablet, Take 20 mg by mouth Daily with supper   Multiple Vitamins-Minerals (THERAPEUTIC MULTIVITAMIN-MINERALS) tablet, Take 1 tablet by mouth daily

## 2022-05-31 NOTE — FLOWSHEET NOTE
RN notified Dr. Nolan Guzman via perfect serve that patient states she had a recent kidney ultrasound at [facility] on Tuesday and declines to repeat here today. Awaiting response at this time. Will continue to monitor closely.

## 2022-05-31 NOTE — CARE COORDINATION
Case Management Initial Discharge Plan  Remy Ley,             Met with:patient to discuss discharge plans. Information verified: address, contacts, phone number, , insurance Yes  Insurance Provider: medicare and med mutual  [place]: No    Emergency Contact/Next of Kin name & number: Jany  [phone]  Who are involved in patient's support system? self    PCP: Rex Herrera  Date of last visit: 1 week ago      Discharge Planning    Living Arrangements:        Home has 1 stories  4 stairs to climb to get into front door, 0stairs to climb to reach second floor  Location of bedroom/bathroom in home main    Patient able to perform ADL's:Independent    Current Services (outpatient & in home) none  DME equipment: 0  DME provider: 0    Is patient receiving oral anticoagulation therapy? Yes    If indicated:   Physician managing anticoagulation treatment: cardiology  Where does patient obtain lab work for ATC treatment? Does patient have any issues/concerns obtaining medications? No  If yes, what are patient's concerns? Is there a preferred Pharmacy after hours or on weekends? Yes    If yes, which pharmacy? [facility] [address]    Potential Assistance Needed:       Patient agreeable to home care: No  [place] of choice provided:  n/a    Prior SNF/Rehab Placement and Facility: n/a  Agreeable to SNF/Rehab: No  [place] of choice provided: n/a     Evaluation: no    Expected Discharge date:       Patient expects to be discharged to:        If home: is the family and/or caregiver wiling & able to provide support at home? n0  Who will be providing this support? self    Follow Up Appointment: Best Day/ Time:      Transportation provider: family  Transportation arrangements needed for discharge: No    Readmission Risk              Risk of Unplanned Readmission:  16             Does patient have a readmission risk score greater than 14?: Yes  If yes, follow-up appointment must be made within 7 days of discharge. Goals of Care:       Educated patient on transitional options, provided freedom of choice and are agreeable with plan      Discharge Plan: [place], A-fib  Patient lives alone in a 1 story home with 4 steps to enter. Declines any skilled needs. Independent and drives. Waiting on cardiology. Plan is home independently. Continue to follow.            Electronically signed by Natasha Coyne RN on 5/31/22 at 2:04 PM EDT

## 2022-05-31 NOTE — FLOWSHEET NOTE
[place] [facility]  PROGRESS NOTE    Room # 7419/0853-24   Name: Michael Costa            Religious: None     Reason for visit: Routine    I visited the patient. Admit Date & Time: 5/30/2022  4:35 PM    Assessment:  Michael Costa is a 67 y.o. female in the hospital because of \"shortness of breath and atrial fibrillation. \" Upon entering the room patient was sitting in chair reading magazines. Patient expressed relief because she is getting a procedure done tomorrow that will hopefully help with her afib. Patient felt unheard by her cardiologist in the past, and now is feeling hopeful that her medical team here is listening to her concerns. She is hoping to be discharged later this week. Intervention:  I introduced myself and my title as  I offered space for patient  to express feelings, needs, and concerns and provided a ministry presence. I asked patient about Anglican inclination, and she expressed that she attends [employer] twice a year but her sister is the Anglican one. I wished her good luck on her procedure tomorrow and hope that it would be helpful. I mentioned chaplains would be available for spiritual and emotional support if needed. Outcome:  Patient was receptive of spiritual care and engaged in conversation. Plan:  Chaplains will remain available to offer spiritual and emotional support as needed. Electronically signed by Suresh Dillard on 5/31/2022 at 4:17 PM.  Alfredo       05/31/22 1619   Encounter Summary   Service Provided For: Patient   Referral/Consult From: [facility] Family members   Last Encounter  05/31/22   Complexity of Encounter Low   Begin Time 1545   End Time  1555   Total Time Calculated 10 min   Encounter    Type Initial Screen/Assessment   Assessment/Intervention/Outcome   Assessment Calm;Coping; Hopeful   Intervention Active listening;Discussed belief system/[employer]ian practices/jory; Explored/Affirmed feelings, thoughts, concerns;Nurtured Hope;Sustaining Presence/Ministry of presence   Outcome Acceptance; Coping;Encouraged;Engaged in conversation;Expressed Gratitude; Optimistic;Receptive

## 2022-05-31 NOTE — CASE COMMUNICATION
Discharge planning    Multaq called into patient [facility] on [address]. Per Laura Navarro. Pharmacist, it will costs the patient $40.00 monthly. Patient is agreeable to co-pay. Plan is to cardiovert patient tomorrow. Continue to follow.

## 2022-05-31 NOTE — PROGRESS NOTES
[facility]  Office: [address], DO, Ganga Sanchez, DO, Modesto Tapia, DO, Lisset Calvillo, DO, Rasheeda Dietrich MD, Marcy Osborne MD, Giuliano Carlos MD, Solange Ross MD, Salma Browne MD, Julia Guevara MD, Melissa Ni MD, Maria G Lorenz, DO, Juliette Bergeron, DO, Blank Jon MD,  Nathanial Apgar, DO, Karli Fournier MD, Gi Pinto MD, Suleiman Hernandez MD, Tanner Mohr DO, Odilon Hartman MD, Kumar Chou MD, Tempie Mcburney, MD, Suzie Hunter, [facility], [facility], CNP, Nayla Hurtado, CNP, Bill Davalos, CNP, Kae Dubin, CNP, Kristie Dumont, CNP, JOSEPH ManuelC, Jeff Livingston, [facility], Elkin Carrion, CNP, Tina Calvert, CNP, Diana Meyer, CNP, Evi Loving, CNS, Glynn Rice, [facility], Akin Lira, CNP, Ag Bridges, CNP, Radha Buenrostro, [facility]    Progress Note    2022    9:09 AM    Name:   Michael Costa  MRN:     [id number]     Acct:      [de-identified]   Room:      Day:  1  Admit Date:  2022  4:35 PM    PCP:   Eleni Alfaro  Code Status:  Full Code    Subjective:     C/C:   Chief Complaint   Patient presents with    Shortness of Breath     Pt reports SOB for several weeks; hx of afib and needs ablation; pt reports she hasn't been able to get an appointment in over a month; diagnosed with Covid on 5/10     Atrial Fibrillation     Interval History Status: not changed. patietn seen and examined. In chair. Still in afib. Eating breakfast. BP stable. On amiodarone drip. Not requiring supplemental oxygenation. Brief History:     67year old female with past medical history of afib with rvr, CKD, chronic CHF, pulmonary hypertension, presents with shortness of breath, found to be in afib with RVR. Has history of EP study with cryoablation on 2021.        Echo 22:  Left ventricle is normal in size  Global left ventricular systolic function is moderately reduced  Estimated ejection fraction is 40 % . Left atrium is moderate to severely dilated. Right atrium is moderately dilated . Normal mitral valve structure and function. Mild to moderate mitral regurgitation. Multiple MR jets noted. Mild to moderate tricuspid regurgitation. Severe pulmonary hypertension. No pericardial effusion seen. Normal aortic root dimension. Review of Systems:     Constitutional:  negative for chills, fevers, sweats  Respiratory:  negative for cough, dyspnea on exertion, shortness of breath, wheezing  Cardiovascular:  negative for chest pain, chest pressure/discomfort, lower extremity edema, palpitations  Gastrointestinal:  negative for abdominal pain, constipation, diarrhea, nausea, vomiting  Neurological:  negative for dizziness, headache    Medications:      Allergies:  No Known Allergies    Current Meds:   Scheduled Meds:    metoprolol tartrate  25 mg Oral BID    aspirin  81 mg Oral Daily    furosemide  20 mg Oral Daily    lisinopril  2.5 mg Oral Daily    therapeutic multivitamin-minerals  1 tablet Oral Daily    rivaroxaban  20 mg Oral Dinner    sodium chloride flush  5-40 mL IntraVENous 2 times per day     Continuous Infusions:    sodium chloride      amiodarone 450mg/250ml D5W infusion 0.5 mg/min (05/30/22 4161)     PRN Meds: sodium chloride flush, sodium chloride, ondansetron **OR** ondansetron, polyethylene glycol, acetaminophen **OR** acetaminophen, potassium chloride **OR** potassium alternative oral replacement **OR** potassium chloride, magnesium sulfate    Data:     Past Medical History:   has a past medical history of Anxiety, Arthritis, Asthmatic bronchitis, unspecified asthma severity, uncomplicated, Atrial fibrillation (HCC), Dyspnea on exertion, GERD (gastroesophageal reflux disease), Hyperlipidemia, Hypertension, Mitral valve disorder, Moderate to severe pulmonary hypertension ([address] Osteoarthritis, Palpitation, Physical deconditioning, Shortness of breath, Sleep apnea, Spinal stenosis at L4-L5 level, Stage 3b chronic kidney disease ([address] Tachycardia, and Tricuspid regurgitation. Social History:   reports that she has never smoked. She has never used smokeless tobacco. She reports current alcohol use of about 5.0 standard drinks of alcohol per week. She reports that she does not use drugs. Family History:   Family History   Problem Relation Age of Onset    High Blood Pressure Mother     Arthritis Mother     Diabetes Father     Arthritis Sister        Vitals:  BP (!) 140/100   Pulse (!) 127   Temp 97.3 °F (36.3 °C) (Temporal)   Resp 18   Ht 5' 8\" (1.727 m)   Wt 132 lb (59.9 kg)   SpO2 96%   BMI 20.07 kg/m²   Temp (24hrs), Av.2 °F (36.2 °C), Min:96.1 °F (35.6 °C), Max:98.3 °F (36.8 °C)    No results for input(s): POCGLU in the last 72 hours. I/O (24Hr):     Intake/Output Summary (Last 24 hours) at 2022 0909  Last data filed at 2022 0600  Gross per 24 hour   Intake 370.12 ml   Output 500 ml   Net -129.88 ml       Labs:  Hematology:  Recent Labs     22  0755   WBC 6.0 5.9   RBC 4.00 4.04   HGB 10.8* 10.7*   HCT 36.2* 36.3   MCV 90.5 89.9   MCH 27.0 26.5   MCHC 29.8 29.5   RDW 17.6* 17.6*    253   MPV 9.9 10.0     Chemistry:  Recent Labs     22  0755     --  138   K 4.0  --  4.1     --  101   CO2 26  --  23   GLUCOSE 123*  --  132*   BUN 30*  --  28*   CREATININE 1.28*  --  1.40*   MG  --  2.0 2.0   ANIONGAP 13  --  14   LABGLOM 41*  --  37*   GFRAA 50*  --  45*   CALCIUM 9.6  --  9.5   PROBNP 9,616*  --   --    TROPHS 31*  --   --      Recent Labs     22  1646 22  0755   PROT 7.2 6.9   LABALBU 4.1 4.0   TSH 2.11  --    AST 18 24   ALT 19 25   ALKPHOS 101 121*   BILITOT 0.58 0.56     ABG:  Lab Results   Component Value Date    POCPH 7.433 2018    POCPCO2 38.3 2018    POCPO2 78.9 2018    POCHCO3 25.6 2018    NBEA NOT REPORTED 05/16/2018    PBEA 1 05/16/2018    NMH5IEC 27 05/16/2018    ZNFB7PVC 96 05/16/2018    FIO2 NOT REPORTED 05/16/2018     Lab Results   Component Value Date/Time    SPECIAL NOT REPORTED 01/30/2015 10:44 AM     No results found for: CULTURE    Radiology:  XR CHEST 1 VIEW    Result Date: 5/30/2022  Stable cardiomegaly. Mild pulmonary vascular congestion. Physical Examination:        General appearance:  alert, cooperative and no distress  Mental Status:  oriented to person, place and time and normal affect  Lungs:  clear to auscultation bilaterally, normal effort  Heart:  irregualrly irregular  Abdomen:  soft, nontender, nondistended, normal bowel sounds, no masses, hepatomegaly, splenomegaly  Extremities:  no edema, redness, tenderness in the calves  Skin:  no gross lesions, rashes, induration    Assessment:        Hospital Problems           Last Modified POA    * (Principal) Atrial fibrillation with rapid ventricular response ([address] 5/30/2022 Yes    Chronic combined systolic and diastolic CHF (congestive heart failure) ([address] 5/30/2022 Yes    Acute kidney injury superimposed on CKD ([address] 5/30/2022 Yes    Essential hypertension 5/30/2022 Yes          Plan:        1. afib with RVR with history of cryoablation. Appreciate cardiology recommendations, restart metoprolol 25 mg BID, amidodarone drip  2. HTN, CKD, lisinopriol, lasix, follow up ultrasound  3. Anemmia. Follow up iron studies, folate and b12, can be due to chronic disease. 4. Had recent echo 4/12/22  5. PT/OT  6.  Monitor Bp.   7. Patient refusing renal ultrasound[facility]    Tiffany Castaneda MD  5/31/2022  9:09 AM

## 2022-06-01 ENCOUNTER — ANESTHESIA EVENT (OUTPATIENT)
Dept: CARDIAC CATH/INVASIVE PROCEDURES | Age: 72
DRG: 309 | End: 2022-06-01
Payer: MEDICARE

## 2022-06-01 ENCOUNTER — APPOINTMENT (OUTPATIENT)
Dept: CARDIAC CATH/INVASIVE PROCEDURES | Age: 72
DRG: 309 | End: 2022-06-01
Payer: MEDICARE

## 2022-06-01 ENCOUNTER — ANESTHESIA (OUTPATIENT)
Dept: CARDIAC CATH/INVASIVE PROCEDURES | Age: 72
DRG: 309 | End: 2022-06-01
Payer: MEDICARE

## 2022-06-01 LAB
EKG Q-T INTERVAL: 252 MS
EKG QRS DURATION: 84 MS
EKG QTC CALCULATION (BAZETT): 396 MS
EKG R AXIS: 29 DEGREES
EKG T AXIS: -141 DEGREES
EKG VENTRICULAR RATE: 149 BPM
P E INTERPRETATION, U: NORMAL
PATHOLOGIST: NORMAL
SPECIMEN TYPE: NORMAL
URINE TOTAL PROTEIN: 18 MG/DL

## 2022-06-01 PROCEDURE — 3700000000 HC ANESTHESIA ATTENDED CARE

## 2022-06-01 PROCEDURE — 2060000000 HC ICU INTERMEDIATE R&B

## 2022-06-01 PROCEDURE — 92960 CARDIOVERSION ELECTRIC EXT: CPT

## 2022-06-01 PROCEDURE — 2580000003 HC RX 258: Performed by: SPECIALIST

## 2022-06-01 PROCEDURE — 6370000000 HC RX 637 (ALT 250 FOR IP): Performed by: STUDENT IN AN ORGANIZED HEALTH CARE EDUCATION/TRAINING PROGRAM

## 2022-06-01 PROCEDURE — 2580000003 HC RX 258: Performed by: NURSE PRACTITIONER

## 2022-06-01 PROCEDURE — 7100000010 HC PHASE II RECOVERY - FIRST 15 MIN

## 2022-06-01 PROCEDURE — 6360000002 HC RX W HCPCS: Performed by: NURSE PRACTITIONER

## 2022-06-01 PROCEDURE — 6370000000 HC RX 637 (ALT 250 FOR IP): Performed by: NURSE PRACTITIONER

## 2022-06-01 PROCEDURE — 6360000002 HC RX W HCPCS: Performed by: SPECIALIST

## 2022-06-01 PROCEDURE — 2580000003 HC RX 258: Performed by: ANESTHESIOLOGY

## 2022-06-01 PROCEDURE — 99232 SBSQ HOSP IP/OBS MODERATE 35: CPT | Performed by: STUDENT IN AN ORGANIZED HEALTH CARE EDUCATION/TRAINING PROGRAM

## 2022-06-01 PROCEDURE — 7100000011 HC PHASE II RECOVERY - ADDTL 15 MIN

## 2022-06-01 PROCEDURE — 5A2204Z RESTORATION OF CARDIAC RHYTHM, SINGLE: ICD-10-PCS | Performed by: STUDENT IN AN ORGANIZED HEALTH CARE EDUCATION/TRAINING PROGRAM

## 2022-06-01 RX ORDER — FENTANYL CITRATE 50 UG/ML
25 INJECTION, SOLUTION INTRAMUSCULAR; INTRAVENOUS EVERY 5 MIN PRN
Status: DISCONTINUED | OUTPATIENT
Start: 2022-06-01 | End: 2022-06-03 | Stop reason: ALTCHOICE

## 2022-06-01 RX ORDER — ONDANSETRON 2 MG/ML
4 INJECTION INTRAMUSCULAR; INTRAVENOUS
Status: ACTIVE | OUTPATIENT
Start: 2022-06-01 | End: 2022-06-01

## 2022-06-01 RX ORDER — SODIUM CHLORIDE 9 MG/ML
INJECTION, SOLUTION INTRAVENOUS PRN
Status: DISCONTINUED | OUTPATIENT
Start: 2022-06-01 | End: 2022-06-03 | Stop reason: HOSPADM

## 2022-06-01 RX ORDER — SODIUM CHLORIDE 0.9 % (FLUSH) 0.9 %
5-40 SYRINGE (ML) INJECTION PRN
Status: DISCONTINUED | OUTPATIENT
Start: 2022-06-01 | End: 2022-06-03 | Stop reason: HOSPADM

## 2022-06-01 RX ORDER — SODIUM CHLORIDE 0.9 % (FLUSH) 0.9 %
5-40 SYRINGE (ML) INJECTION EVERY 12 HOURS SCHEDULED
Status: DISCONTINUED | OUTPATIENT
Start: 2022-06-01 | End: 2022-06-03 | Stop reason: HOSPADM

## 2022-06-01 RX ORDER — SODIUM CHLORIDE 9 MG/ML
INJECTION, SOLUTION INTRAVENOUS CONTINUOUS PRN
Status: DISCONTINUED | OUTPATIENT
Start: 2022-06-01 | End: 2022-06-01 | Stop reason: SDUPTHER

## 2022-06-01 RX ORDER — GUAIFENESIN 100 MG/5ML
200 SOLUTION ORAL EVERY 4 HOURS PRN
Status: DISCONTINUED | OUTPATIENT
Start: 2022-06-01 | End: 2022-06-03 | Stop reason: HOSPADM

## 2022-06-01 RX ORDER — PROPOFOL 10 MG/ML
INJECTION, EMULSION INTRAVENOUS PRN
Status: DISCONTINUED | OUTPATIENT
Start: 2022-06-01 | End: 2022-06-01 | Stop reason: SDUPTHER

## 2022-06-01 RX ADMIN — PROPOFOL 45 MG: 10 INJECTION, EMULSION INTRAVENOUS at 13:01

## 2022-06-01 RX ADMIN — SODIUM CHLORIDE, PRESERVATIVE FREE 10 ML: 5 INJECTION INTRAVENOUS at 23:17

## 2022-06-01 RX ADMIN — GUAIFENESIN 200 MG: 200 SOLUTION ORAL at 06:41

## 2022-06-01 RX ADMIN — AMIODARONE HYDROCHLORIDE 0.5 MG/MIN: 50 INJECTION, SOLUTION INTRAVENOUS at 07:14

## 2022-06-01 RX ADMIN — PROPOFOL 25 MG: 10 INJECTION, EMULSION INTRAVENOUS at 13:00

## 2022-06-01 RX ADMIN — SODIUM CHLORIDE: 9 INJECTION, SOLUTION INTRAVENOUS at 12:56

## 2022-06-01 RX ADMIN — RIVAROXABAN 20 MG: 20 TABLET, FILM COATED ORAL at 16:57

## 2022-06-01 RX ADMIN — DRONEDARONE 400 MG: 400 TABLET, FILM COATED ORAL at 16:57

## 2022-06-01 RX ADMIN — SODIUM CHLORIDE, PRESERVATIVE FREE 10 ML: 5 INJECTION INTRAVENOUS at 21:39

## 2022-06-01 RX ADMIN — GUAIFENESIN 200 MG: 200 SOLUTION ORAL at 16:07

## 2022-06-01 RX ADMIN — METOPROLOL TARTRATE 25 MG: 25 TABLET, FILM COATED ORAL at 21:38

## 2022-06-01 RX ADMIN — BENZONATATE 100 MG: 100 CAPSULE ORAL at 21:38

## 2022-06-01 RX ADMIN — ASPIRIN 81 MG: 81 TABLET, COATED ORAL at 08:44

## 2022-06-01 RX ADMIN — GUAIFENESIN 200 MG: 200 SOLUTION ORAL at 23:25

## 2022-06-01 RX ADMIN — FUROSEMIDE 20 MG: 20 TABLET ORAL at 08:44

## 2022-06-01 RX ADMIN — METOPROLOL TARTRATE 25 MG: 25 TABLET, FILM COATED ORAL at 08:44

## 2022-06-01 ASSESSMENT — PAIN SCALES - GENERAL
PAINLEVEL_OUTOF10: 0

## 2022-06-01 NOTE — PRE SEDATION
Sedation Pre-Procedure Note    Patient Name: Yanely Wen   YOB: 1950  Room/Bed: 2042/2042-01  Medical Record Number: [id number]  Date: 6/1/2022   Time: 1:25 PM       Indication:  DCCV    Consent: I have discussed with the patient and/or the patient representative the indication, alternatives, and the possible risks and/or complications of the planned procedure and the anesthesia methods. The patient and/or patient representative appear to understand and agree to proceed. Vital Signs:   Vitals:    06/01/22 1315   BP: 95/61   Pulse: 53   Resp: 12   Temp:    SpO2: 100%       Past Medical History:   has a past medical history of Anxiety, Arthritis, Asthmatic bronchitis, unspecified asthma severity, uncomplicated, Atrial fibrillation (HCC), Dyspnea on exertion, GERD (gastroesophageal reflux disease), Hyperlipidemia, Hypertension, Mitral valve disorder, Moderate to severe pulmonary hypertension ([address] Osteoarthritis, Palpitation, Physical deconditioning, Shortness of breath, Sleep apnea, Spinal stenosis at L4-L5 level, Stage 3b chronic kidney disease ([address] Tachycardia, and Tricuspid regurgitation. Past Surgical History:   has a past surgical history that includes knee joint manipulation (Right); joint replacement (Right); joint replacement (Left); Colonoscopy; Spine surgery (07/2017); Cardiac catheterization (05/19/2014); Cardiac catheterization (01/20/2016); Endoscopy, colon, diagnostic; and Cardiac surgery (11/01/2021).     Medications:   Scheduled Meds:    sodium chloride flush  5-40 mL IntraVENous 2 times per day    metoprolol tartrate  25 mg Oral BID    aspirin  81 mg Oral Daily    furosemide  20 mg Oral Daily    therapeutic multivitamin-minerals  1 tablet Oral Daily    rivaroxaban  20 mg Oral Dinner    sodium chloride flush  5-40 mL IntraVENous 2 times per day     Continuous Infusions:    sodium chloride      sodium chloride      amiodarone 450mg/250ml D5W infusion 0.5 mg/min (22 9656)     PRN Meds: guaiFENesin, sodium chloride flush, sodium chloride, fentanNYL, HYDROmorphone, ondansetron, benzonatate, sodium chloride flush, sodium chloride, ondansetron **OR** ondansetron, polyethylene glycol, acetaminophen **OR** acetaminophen, potassium chloride **OR** potassium alternative oral replacement **OR** potassium chloride, magnesium sulfate  Home Meds:   Prior to Admission medications    Medication Sig Start Date End Date Taking? Authorizing Provider   dronedarone hcl (MULTAQ) 400 mg TABS Take 1 tablet by mouth 2 times daily (with meals) 22  Yes Nadege Palencia MD   benzonatate (TESSALON) 100 MG capsule Take 100 mg by mouth 3 times daily as needed for Cough   Yes Historical Provider, MD   metoprolol succinate (TOPROL XL) 25 MG extended release tablet Take 25 mg by mouth daily   Yes Historical Provider, MD   vitamin D (CHOLECALCIFEROL) 25 MCG (1000 UT) TABS tablet Take 1,000 Units by mouth daily   Yes Historical Provider, MD   Cyanocobalamin (VITAMIN B12 PO) Take 1 tablet by mouth daily   Yes Historical Provider, MD   cefUROXime (CEFTIN) 500 MG tablet Take 500 mg by mouth 2 times daily   Yes Historical Provider, MD   furosemide (LASIX) 20 MG tablet Take 1 tablet by mouth daily 22   Perry Calender DO Ginger   XARELTO 20 MG TABS tablet Take 20 mg by mouth Daily with supper  21   Historical Provider, MD   Multiple Vitamins-Minerals (THERAPEUTIC MULTIVITAMIN-MINERALS) tablet Take 1 tablet by mouth daily    Historical Provider, MD   Handicap Placard MISC by Does not apply route  5 years from origninal written date 2022    Dx: Osteoarthritis unable to ambulate over 150ft 17   Jerrica Hays MD   aspirin 81 MG tablet Take 81 mg by mouth daily.     Historical Provider, MD     Coumadin Use Last 7 Days:  no  Antiplatelet drug therapy use last 7 days: no  Other anticoagulant use last 7 days: yes - Xarelto  Additional Medication Information:  On amio      Pre-Sedation Documentation and Exam:   I have personally completed a history, physical exam & review of systems for this patient (see notes).     Mallampati Airway Assessment:  Mallampati Class II - (soft palate, fauces & uvula are visible)    Prior History of Anesthesia Complications:   none    ASA Classification:  Class 2 - A normal healthy patient with mild systemic disease    Sedation/ Anesthesia Plan:   general    Medications Planned:   propofol intravenously    Patient is an appropriate candidate for plan of sedation: yes    Electronically signed by Nadege Palencia MD on 6/1/2022 at 1:25 PM

## 2022-06-01 NOTE — PROGRESS NOTES
[facility]  Office: [address], DO, Areli Lancese, DO, Romero Jorge L, DO, Zakiya Calvillo, DO, Shagufta Hutchinson MD, John López MD, Rickey Molina MD, Roman Resendez MD, Keesha Negrete MD, Mann Smith MD, Pastor Snider MD, Sandra Rodriguez, DO, Murray Phillips, DO, Po Mojica MD,  Kenney Acuna DO, Oscar Gale MD, Margot Osborne MD, Min Leonard MD, Simin Dewitt, DO, Eliana Lopez MD, Nakul Feliz MD, Galileo Muñoz MD, Tal Medina, CNP, [facility], CNP, Corazon Diaz, CNP, Marsha Andersen, CNP, Tiffany Gutierrez, CNP, Nikole Kraft, CNP, Sergio Zayas, PA-C, Hazel Girard, DNP, Jaskaran Velazquez, CNP, Anai Kearns, CNP, Betina Garcia, CNP, Becky Malhotra, CNS, Lashon Gil, DNP, Daniella Alston, CNP, Aria Gasca, CNP, Jany Gallegos, [facility] [employer]    Progress Note    6/1/2022    10:05 AM    Name:   Han Lema  MRN:     [id number]     Acct:      [de-identified]   Room:   2042/2042-01  IP Day:  2  Admit Date:  5/30/2022  4:35 PM    PCP:   Joel Caldwell  Code Status:  Full Code    Subjective:     C/C:   Chief Complaint   Patient presents with    Shortness of Breath     Pt reports SOB for several weeks; hx of afib and needs ablation; pt reports she hasn't been able to get an appointment in over a month; diagnosed with Covid on 5/10     Atrial Fibrillation     Interval History Status: not changed. patietn seen and examined. In chair. Still in afib better controlle today. NPO. Plan for cardioversion per cardiology    Brief History:     67year old female with past medical history of afib with rvr, CKD, chronic CHF, pulmonary hypertension, presents with shortness of breath, found to be in afib with RVR. Has history of EP study with cryoablation on 11/1/2021.        Echo 4/13/22:  Left ventricle is normal in size  Global left ventricular systolic function is moderately reduced  Estimated ejection fraction is 40 % . Left atrium is moderate to severely dilated. Right atrium is moderately dilated . Normal mitral valve structure and function. Mild to moderate mitral regurgitation. Multiple MR jets noted. Mild to moderate tricuspid regurgitation. Severe pulmonary hypertension. No pericardial effusion seen. Normal aortic root dimension. Review of Systems:     Constitutional:  negative for chills, fevers, sweats  Respiratory:  negative for cough, dyspnea on exertion, shortness of breath, wheezing  Cardiovascular:  negative for chest pain, chest pressure/discomfort, lower extremity edema, palpitations  Gastrointestinal:  negative for abdominal pain, constipation, diarrhea, nausea, vomiting  Neurological:  negative for dizziness, headache    Medications:      Allergies:  No Known Allergies    Current Meds:   Scheduled Meds:    metoprolol tartrate  25 mg Oral BID    aspirin  81 mg Oral Daily    furosemide  20 mg Oral Daily    therapeutic multivitamin-minerals  1 tablet Oral Daily    rivaroxaban  20 mg Oral Dinner    sodium chloride flush  5-40 mL IntraVENous 2 times per day     Continuous Infusions:    sodium chloride      amiodarone 450mg/250ml D5W infusion 0.5 mg/min (06/01/22 0714)     PRN Meds: guaiFENesin, benzonatate, sodium chloride flush, sodium chloride, ondansetron **OR** ondansetron, polyethylene glycol, acetaminophen **OR** acetaminophen, potassium chloride **OR** potassium alternative oral replacement **OR** potassium chloride, magnesium sulfate    Data:     Past Medical History:   has a past medical history of Anxiety, Arthritis, Asthmatic bronchitis, unspecified asthma severity, uncomplicated, Atrial fibrillation (HCC), Dyspnea on exertion, GERD (gastroesophageal reflux disease), Hyperlipidemia, Hypertension, Mitral valve disorder, Moderate to severe pulmonary hypertension ([address] Osteoarthritis, Palpitation, Physical deconditioning, Shortness of breath, Sleep apnea, Spinal stenosis at L4-L5 level, Stage 3b chronic kidney disease ([address] Tachycardia, and Tricuspid regurgitation. Social History:   reports that she has never smoked. She has never used smokeless tobacco. She reports current alcohol use of about 5.0 standard drinks of alcohol per week. She reports that she does not use drugs. Family History:   Family History   Problem Relation Age of Onset    High Blood Pressure Mother     Arthritis Mother     Diabetes Father     Arthritis Sister        Vitals:  BP (!) 117/92   Pulse (!) 120   Temp 97 °F (36.1 °C) (Temporal)   Resp 16   Ht 5' 8\" (1.727 m)   Wt 137 lb 6.4 oz (62.3 kg)   SpO2 95%   BMI 20.89 kg/m²   Temp (24hrs), Av.6 °F (36.4 °C), Min:97 °F (36.1 °C), Max:98.6 °F (37 °C)    No results for input(s): POCGLU in the last 72 hours. I/O (24Hr):     Intake/Output Summary (Last 24 hours) at 2022 1005  Last data filed at 2022  Gross per 24 hour   Intake 780 ml   Output --   Net 780 ml       Labs:  Hematology:  Recent Labs     22  0755   WBC 6.0 5.9   RBC 4.00 4.04   HGB 10.8* 10.7*   HCT 36.2* 36.3   MCV 90.5 89.9   MCH 27.0 26.5   MCHC 29.8 29.5   RDW 17.6* 17.6*    253   MPV 9.9 10.0     Chemistry:  Recent Labs     22  0755     --  138   K 4.0  --  4.1     --  101   CO2 26  --  23   GLUCOSE 123*  --  132*   BUN 30*  --  28*   CREATININE 1.28*  --  1.40*   MG  --  2.0 2.0   ANIONGAP 13  --  14   LABGLOM 41*  --  37*   GFRAA 50*  --  45*   CALCIUM 9.6  --  9.5   PROBNP 9,616*  --   --    TROPHS 31*  --   --      Recent Labs     22/22  0755   PROT 7.2 6.9   LABALBU 4.1 4.0   TSH 2.11  --    AST 18 24   ALT 19 25   ALKPHOS 101 121*   BILITOT 0.58 0.56     ABG:  Lab Results   Component Value Date    POCPH 7.433 2018    POCPCO2 38.3 2018    POCPO2 78.9 2018    POCHCO3 25.6 2018    NBEA NOT REPORTED 2018    PBEA 1 2018    EJC4QHF 27 05/16/2018    OPME6EED 96 05/16/2018    FIO2 NOT REPORTED 05/16/2018     Lab Results   Component Value Date/Time    SPECIAL NOT REPORTED 01/30/2015 10:44 AM     No results found for: CULTURE    Radiology:  XR CHEST 1 VIEW    Result Date: 5/30/2022  Stable cardiomegaly. Mild pulmonary vascular congestion. Physical Examination:        General appearance:  alert, cooperative and no distress  Mental Status:  oriented to person, place and time and normal affect  Lungs:  clear to auscultation bilaterally, normal effort  Heart:  irregualrly irregular  Abdomen:  soft, nontender, nondistended, normal bowel sounds, no masses, hepatomegaly, splenomegaly  Extremities:  no edema, redness, tenderness in the calves  Skin:  no gross lesions, rashes, induration    Assessment:        Hospital Problems           Last Modified POA    * (Principal) Atrial fibrillation with rapid ventricular response ([address] 5/30/2022 Yes    Moderate to severe pulmonary hypertension ([address] 5/31/2022 Yes    Chronic combined systolic and diastolic CHF (congestive heart failure) ([address] 5/30/2022 Yes    Acute kidney injury superimposed on CKD ([address] 5/30/2022 Yes    Essential hypertension 5/30/2022 Yes          Plan:        1. afib with RVR with history of cryoablation. Appreciate cardiology recommendations, metoprolol 25 mg BID, amidodarone drip. NPO possible cradioversion later today  2. HTN, CKD, lisinopriol, lasix,   3. Anemmia suspect due to chronic disease, borderline iron saturation, follow up with PCP  4.  PT/OT    Jadene Boast, MD  6/1/2022  10:05 AM

## 2022-06-01 NOTE — PROGRESS NOTES
Transported per bed to room. Nurse given report and notified of arrival.  Pt without complaints. Sister at bedside.   Call light provided

## 2022-06-01 NOTE — ANESTHESIA POSTPROCEDURE EVALUATION
Department of Anesthesiology  Postprocedure Note    Patient: Kapil Subramanian  MRN: [id number]  YOB: 1950  Date of evaluation: 6/1/2022  Time:  2:48 PM     Procedure Summary     Date: 06/01/22 Room / Location: [facility]    Anesthesia Start: 7645 Anesthesia Stop: 0833    Procedure: CARDIOVERSION Diagnosis:     Scheduled Providers:  Responsible Provider: Thelma Alex MD    Anesthesia Type: general, TIVA ASA Status: 4          Anesthesia Type: No value filed. Adrián Phase I:      Adrián Phase II: Adrián Score: 5    Last vitals: Reviewed and per EMR flowsheets.        Anesthesia Post Evaluation    Comments: nac

## 2022-06-01 NOTE — PROCEDURES
Cardioversion Procedure Note    Indication: Atrial Fibrillation    Procedure: synchronized electrical cardioversion. Consent: The patient was counseled regarding the procedure, its indications, risks, potential complications and alternatives, and any questions were answered. Consent was obtained to proceed. Pre-Medication: IV  propofol intravenously by anesthesia     Procedure: The patient was placed in the supine position and the chest area was exposed. The cardioversion pads were applied in the standard manner and configuration, the defibrillator was set on the synchronous mode and charged to 200  Joules,  and synchronized shock was delivered with successful termination of Atrial fibrillation and sinus rhythm was restored. The patient tolerated the procedure well and is currently recovering from anesthesia. Complications: None    Conclusion: Successful synchronized electrial cardioversion wiith restoration of sinus rhythm     Plan: Continue Amio.  Plan to switch to Multaq tonight  Monitor overnight    Omar Arreguin MD

## 2022-06-01 NOTE — ANESTHESIA PRE PROCEDURE
Department of Anesthesiology  Preprocedure Note       Name:  Marium Otto   Age:  67 y.o.  :  1950                                          MRN:  [id number]         Date:  2022      Surgeon: * Surgery not found *    Procedure: cardioversion    Medications prior to admission:   Prior to Admission medications    Medication Sig Start Date End Date Taking? Authorizing Provider   dronedarone hcl (MULTAQ) 400 mg TABS Take 1 tablet by mouth 2 times daily (with meals) 22   Marium Otto MD   benzonatate (TESSALON) 100 MG capsule Take 100 mg by mouth 3 times daily as needed for Cough    Historical Provider, MD   metoprolol succinate (TOPROL XL) 25 MG extended release tablet Take 25 mg by mouth daily    Historical Provider, MD   vitamin D (CHOLECALCIFEROL) 25 MCG (1000 UT) TABS tablet Take 1,000 Units by mouth daily    Historical Provider, MD   Cyanocobalamin (VITAMIN B12 PO) Take 1 tablet by mouth daily    Historical Provider, MD   cefUROXime (CEFTIN) 500 MG tablet Take 500 mg by mouth 2 times daily    Historical Provider, MD   furosemide (LASIX) 20 MG tablet Take 1 tablet by mouth daily 22   Reuben Miles DO   XARELTO 20 MG TABS tablet Take 20 mg by mouth Daily with supper  21   Historical Provider, MD   Multiple Vitamins-Minerals (THERAPEUTIC MULTIVITAMIN-MINERALS) tablet Take 1 tablet by mouth daily    Historical Provider, MD   Handicap Placard MISC by Does not apply route  5 years from origninal written date 2022    Dx: Osteoarthritis unable to ambulate over 150ft 17   Ghassan Jordan MD   aspirin 81 MG tablet Take 81 mg by mouth daily. Historical Provider, MD       Current medications:    No current facility-administered medications for this visit.      Current Outpatient Medications   Medication Sig Dispense Refill    dronedarone hcl (MULTAQ) 400 mg TABS Take 1 tablet by mouth 2 times daily (with meals) 60 tablet 1     Facility-Administered Medications Ordered in Other Visits   Medication Dose Route Frequency Provider Last Rate Last Admin    guaiFENesin (ROBITUSSIN) 100 MG/5ML oral solution 200 mg  200 mg Oral Q4H PRN NITESH Barrera - CNP   200 mg at 06/01/22 0641    metoprolol tartrate (LOPRESSOR) tablet 25 mg  25 mg Oral BID Jayesh De La O MD   25 mg at 06/01/22 0844    benzonatate (TESSALON) capsule 100 mg  100 mg Oral TID PRN Lizzeth Bishop APRN - CNP   100 mg at 05/31/22 2100    aspirin EC tablet 81 mg  81 mg Oral Daily Terie Poplin, APRN - CNP   81 mg at 06/01/22 0844    furosemide (LASIX) tablet 20 mg  20 mg Oral Daily Terie Poplin, APRN - CNP   20 mg at 06/01/22 0844    therapeutic multivitamin-minerals 1 tablet  1 tablet Oral Daily Terie Poplin, APRN - CNP   1 tablet at 05/30/22 2018    rivaroxaban (XARELTO) tablet 20 mg  20 mg Oral Dinner Termiguel Poplin, APRN - CNP   20 mg at 05/31/22 1716    sodium chloride flush 0.9 % injection 5-40 mL  5-40 mL IntraVENous 2 times per day Terie Poplin, APRN - CNP   10 mL at 05/31/22 2043    sodium chloride flush 0.9 % injection 10 mL  10 mL IntraVENous PRN Terie Poplin, APRN - CNP        0.9 % sodium chloride infusion   IntraVENous PRN Terie Poplin, APRN - CNP        ondansetron (ZOFRAN-ODT) disintegrating tablet 4 mg  4 mg Oral Q8H PRN Terie Poplin, APRN - CNP        Or    ondansetron (ZOFRAN) injection 4 mg  4 mg IntraVENous Q6H PRN Terie Poplin, APRN - CNP        polyethylene glycol (GLYCOLAX) packet 17 g  17 g Oral Daily PRN Terie Poplin, APRN - CNP        acetaminophen (TYLENOL) tablet 650 mg  650 mg Oral Q6H PRN Terie Poplin, APRN - CNP        Or    acetaminophen (TYLENOL) suppository 650 mg  650 mg Rectal Q6H PRN Terie Poplin, APRN - CNP        amiodarone (CORDARONE) 450 mg in dextrose 5 % 250 mL infusion  0.5 mg/min IntraVENous Continuous Terie Poplin, APRN - CNP 16.7 mL/hr at 06/01/22 0714 0.5 mg/min at 06/01/22 0714    potassium chloride (KLOR-CON M) extended release tablet 40 mEq  40 mEq Oral PRN Margeret Kobus, APRN - CNP        Or    potassium bicarb-citric acid (EFFER-K) effervescent tablet 40 mEq  40 mEq Oral PRN Margeret Kobus, APRN - CNP        Or    potassium chloride 10 mEq/100 mL IVPB (Peripheral Line)  10 mEq IntraVENous PRN Margeret Kobus, APRN - CNP        magnesium sulfate 1000 mg in dextrose 5% 100 mL IVPB  1,000 mg IntraVENous PRN Margeret Kobus, APRN - CNP           Allergies:  No Known Allergies    Problem List:    Patient Active Problem List   Diagnosis Code    Paroxysmal A-fib ([facility]) I48.0    Primary osteoarthritis involving multiple joints M89.49    Dysthymia F34.1    Tricuspid regurgitation I07.1    Osteoarthritis M19.90    Spinal stenosis at L4-L5 level M48.061    Dyspnea on exertion R06.00    Moderate to severe pulmonary hypertension ([facility]) I27.20    JOSE on CPAP G47.33, Z99.89    Deep vein thrombosis (DVT) of proximal lower extremity ([facility]) I82.4Y9    Chronic 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 ([facility])  I82.531    Embolism and thrombosis of both popliteal veins ([facility])  I82.433    Status post cardiac catheterization Z98.890    Asthmatic bronchitis, unspecified asthma severity, uncomplicated W95.716    Balance problems R26.89    Chronic combined systolic and diastolic CHF (congestive heart failure) ([facility]) I50.42    Acute kidney injury superimposed on CKD ([facility]) N17.9, N18.9    Essential hypertension I10    Gait disturbance R26.9    Idiopathic peripheral neuropathy G60.9    Laryngopharyngeal reflux (LPR) K21.9    Leg length discrepancy M21.70    Limp R26.89    Peripheral polyneuropathy G62.9    Primary osteoarthritis of right hip M16.11    Pulmonary scarring J98.4    Restrictive lung disease J98.4    Enthesopathy M77.9    Kyphosis of thoracic region M40.204    Spondylolisthesis at L5-S1 level M43.17    Spondylosis of lumbar region without myelopathy or radiculopathy M47.816 06/01/22 (!) 105/90   04/13/22 122/71   03/09/22 125/72       NPO Status:                                                                                 BMI:   Wt Readings from Last 3 Encounters:   06/01/22 137 lb 6.4 oz (62.3 kg)   04/13/22 137 lb 1 oz (62.2 kg)   03/09/22 147 lb (66.7 kg)     There is no height or weight on file to calculate BMI.    CBC:   Lab Results   Component Value Date    WBC 5.9 05/31/2022    RBC 4.04 05/31/2022    HGB 10.7 05/31/2022    HCT 36.3 05/31/2022    MCV 89.9 05/31/2022    RDW 17.6 05/31/2022     05/31/2022       CMP:   Lab Results   Component Value Date     05/31/2022    K 4.1 05/31/2022     05/31/2022    CO2 23 05/31/2022    BUN 28 05/31/2022    CREATININE 1.40 05/31/2022    GFRAA 45 05/31/2022    LABGLOM 37 05/31/2022    GLUCOSE 132 05/31/2022    PROT 6.9 05/31/2022    CALCIUM 9.5 05/31/2022    BILITOT 0.56 05/31/2022    ALKPHOS 121 05/31/2022    AST 24 05/31/2022    ALT 25 05/31/2022       POC Tests: No results for input(s): POCGLU, POCNA, POCK, POCCL, POCBUN, POCHEMO, POCHCT in the last 72 hours.     Coags: No results found for: PROTIME, INR, APTT    HCG (If Applicable): No results found for: PREGTESTUR, PREGSERUM, HCG, HCGQUANT     ABGs: No results found for: PHART, PO2ART, FAV4JWH, KVD2NWO, BEART, J5AGHWFS     Type & Screen (If Applicable):  No results found for: LABABO, LABRH    Drug/Infectious Status (If Applicable):  No results found for: HIV, HEPCAB    COVID-19 Screening (If Applicable): No results found for: COVID19        Anesthesia Evaluation   no history of anesthetic complications:   Airway: Mallampati: II  TM distance: >3 FB   Neck ROM: full  Mouth opening: < 3 FB   Dental:          Pulmonary:normal exam    (+) sleep apnea:  asthma:                            Cardiovascular:    (+) hypertension:, valvular problems/murmurs: MR, dysrhythmias:, CHF: diastolic and systolic, pulmonary hypertension:,         Rhythm: irregular                   ROS comment: 10/19 cardiac cath  Procedure  Procedure Type:      Diagnostic procedure: Rt Heart Cath     Complications:    - No complication      Conclusions      Procedure Summary      #1 borderline elevation of the pulmonary artery pressure   Normal O2 saturation in the pulmonary artery           4/22  CONCLUSIONS     Summary  Left ventricle is normal in size  Global left ventricular systolic function is moderately reduced  Estimated ejection fraction is 40 % . Left atrium is moderate to severely dilated. Right atrium is moderately dilated . Normal mitral valve structure and function. Mild to moderate mitral regurgitation. Multiple MR jets noted. Mild to moderate tricuspid regurgitation. Severe pulmonary hypertension. No pericardial effusion seen. Normal aortic root dimension. Neuro/Psych:   (+) neuromuscular disease (neuropathy):, depression/anxiety             GI/Hepatic/Renal:   (+) GERD:, renal disease: CRI,           Endo/Other:    (+) blood dyscrasia: anemia, arthritis: OA., .                 Abdominal:             Vascular:   + DVT, . Other Findings:             Anesthesia Plan      general and TIVA     ASA 4       Induction: intravenous. MIPS: prophylactic pharmacologic antiemetic agents not administered perioperatively for documented reasons. Anesthetic plan and risks discussed with patient. Plan discussed with CRNA.     Attending anesthesiologist reviewed and agrees with Jimena Pina MD   6/1/2022

## 2022-06-01 NOTE — PROGRESS NOTES
Section of Cardiology  Progress Note      Date:  6/1/2022  Patient: Peyman Lanier  Admission:  5/30/2022  4:35 PM  Admit DX: DELIA (acute kidney injury) ([facility] [address] [N17.9]  Atrial fibrillation with rapid ventricular response (HCC) [I48.91]  Atrial fibrillation with RVR ([facility] [address] [I48.91]  Elevated brain natriuretic peptide (BNP) level [R79.89]  Age:  67 y. o., 1950     LOS: 2 days     Reason for evaluation:   pafib      SUBJECTIVE:     The patient was seen and examined. Notes and labs reviewed. Pt doing ok. HR is 1110 range in afib overnight  SOB stable, no CP, edema, palpitations  BP stable  No acute issues. NPO for procedure today    OBJECTIVE:      EXAM:   Vitals:    VITALS:  /86   Pulse (!) 128   Temp 97.8 °F (36.6 °C) (Temporal)   Resp 16   Ht 5' 8\" (1.727 m)   Wt 137 lb 6.4 oz (62.3 kg)   SpO2 98%   BMI 20.89 kg/m²   24HR INTAKE/OUTPUT:    Intake/Output Summary (Last 24 hours) at 6/1/2022 0816  Last data filed at 5/31/2022 2055  Gross per 24 hour   Intake 1260 ml   Output 350 ml   Net 910 ml       CONSTITUTIONAL:  awake, alert, cooperative, no apparent distress, and appears stated age. HEENT: Normal jugular venous pulsations, no carotid bruits. LUNGS: Good respiratory effort On auscultation: clear to auscultation bilaterally  CARDIOVASCULAR:  Normal apical impulse, irregular rate and rhythm, normal S1 and S2, no S3 or S4, and no murmur or rub noted. ABDOMEN: Soft, nontender, nondistended. SKIN: Warm and dry. EXTREMITIES:No lower extremity edema.      Current Inpatient Medications:   metoprolol tartrate  25 mg Oral BID    aspirin  81 mg Oral Daily    furosemide  20 mg Oral Daily    therapeutic multivitamin-minerals  1 tablet Oral Daily    rivaroxaban  20 mg Oral Dinner    sodium chloride flush  5-40 mL IntraVENous 2 times per day       IV Infusions (if any):   sodium chloride      amiodarone 450mg/250ml D5W infusion 0.5 mg/min (06/01/22 0714)       Diagnostics:   Telemetry: Afib rates 110      Labs:   CBC:  Recent Labs     05/30/22 1646 05/31/22  0755   WBC 6.0 5.9   HGB 10.8* 10.7*   HCT 36.2* 36.3    253     Magnesium:  Recent Labs     05/30/22 2010 05/31/22  0755   MG 2.0 2.0     BMP:  Recent Labs     05/30/22 1646 05/31/22  0755    138   K 4.0 4.1   CALCIUM 9.6 9.5   CO2 26 23   BUN 30* 28*   CREATININE 1.28* 1.40*   LABGLOM 41* 37*   GLUCOSE 123* 132*     BNP:  Recent Labs     05/30/22  1646   PROBNP 9,616*     PT/INR:No results for input(s): PROTIME, INR in the last 72 hours. APTT:No results for input(s): APTT in the last 72 hours. CARDIAC ENZYMES:  Recent Labs     05/30/22  1646   TROPHS 31*     FASTING LIPID PANEL:No results found for: HDL, LDLDIRECT, LDLCALC, TRIG  LIVER PROFILE:  Recent Labs     05/30/22 1646 05/31/22  0755   AST 18 24   ALT 19 25   LABALBU 4.1 4.0   ALKPHOS 101 121*   BILITOT 0.58 0.56   PROT 7.2 6.9        ASSESSMENT:  · Atrial Fibrillation with RVR  Hx of recent ablation Nov 2021  On Xarelto. Not on rhythm control  · Acute on Chronic Systolic Heart Failure  EF 40% with PHTN on ECHO 4/2022  · Acute on Chronic Kidney Disease  · On chronic Anticoagulation (Xarelto)  · Hypertension  · Hyperlipidemia  · JOSE (not on CPAP)      PLAN:  1. Continue current medications. 2. Cont. Amio. Multaq is covered at pharmacy and pt is agreeable to cost. Will transition over after cardioversion today - Multaq 400mg PO BID starting this evening  3. DCCV today at 1pm  4. Cont Xarelto 20mg and Metoprolol 25mg PO BID  5. Monitor renal function  6. Monitor overnight and possible DC tomorrow      Discussed with patient and family and nursing.     Nhan Nguyen MD

## 2022-06-02 LAB
EKG ATRIAL RATE: 61 BPM
EKG P AXIS: 40 DEGREES
EKG P-R INTERVAL: 160 MS
EKG Q-T INTERVAL: 518 MS
EKG QRS DURATION: 96 MS
EKG QTC CALCULATION (BAZETT): 521 MS
EKG R AXIS: 8 DEGREES
EKG T AXIS: 143 DEGREES
EKG VENTRICULAR RATE: 61 BPM

## 2022-06-02 PROCEDURE — 6370000000 HC RX 637 (ALT 250 FOR IP): Performed by: NURSE PRACTITIONER

## 2022-06-02 PROCEDURE — 6360000002 HC RX W HCPCS: Performed by: STUDENT IN AN ORGANIZED HEALTH CARE EDUCATION/TRAINING PROGRAM

## 2022-06-02 PROCEDURE — 2060000000 HC ICU INTERMEDIATE R&B

## 2022-06-02 PROCEDURE — 93005 ELECTROCARDIOGRAM TRACING: CPT | Performed by: STUDENT IN AN ORGANIZED HEALTH CARE EDUCATION/TRAINING PROGRAM

## 2022-06-02 PROCEDURE — 99232 SBSQ HOSP IP/OBS MODERATE 35: CPT | Performed by: STUDENT IN AN ORGANIZED HEALTH CARE EDUCATION/TRAINING PROGRAM

## 2022-06-02 PROCEDURE — 2580000003 HC RX 258: Performed by: ANESTHESIOLOGY

## 2022-06-02 PROCEDURE — 6370000000 HC RX 637 (ALT 250 FOR IP): Performed by: STUDENT IN AN ORGANIZED HEALTH CARE EDUCATION/TRAINING PROGRAM

## 2022-06-02 RX ORDER — FUROSEMIDE 10 MG/ML
40 INJECTION INTRAMUSCULAR; INTRAVENOUS ONCE
Status: COMPLETED | OUTPATIENT
Start: 2022-06-02 | End: 2022-06-02

## 2022-06-02 RX ORDER — AMIODARONE HYDROCHLORIDE 200 MG/1
200 TABLET ORAL DAILY
Status: DISCONTINUED | OUTPATIENT
Start: 2022-06-03 | End: 2022-06-03 | Stop reason: HOSPADM

## 2022-06-02 RX ADMIN — RIVAROXABAN 20 MG: 20 TABLET, FILM COATED ORAL at 17:36

## 2022-06-02 RX ADMIN — FUROSEMIDE 20 MG: 20 TABLET ORAL at 10:12

## 2022-06-02 RX ADMIN — GUAIFENESIN 200 MG: 200 SOLUTION ORAL at 17:17

## 2022-06-02 RX ADMIN — BENZONATATE 100 MG: 100 CAPSULE ORAL at 10:13

## 2022-06-02 RX ADMIN — FUROSEMIDE 40 MG: 10 INJECTION, SOLUTION INTRAMUSCULAR; INTRAVENOUS at 15:20

## 2022-06-02 RX ADMIN — GUAIFENESIN 200 MG: 200 SOLUTION ORAL at 22:52

## 2022-06-02 RX ADMIN — SODIUM CHLORIDE, PRESERVATIVE FREE 10 ML: 5 INJECTION INTRAVENOUS at 22:53

## 2022-06-02 RX ADMIN — ASPIRIN 81 MG: 81 TABLET, COATED ORAL at 10:13

## 2022-06-02 RX ADMIN — METOPROLOL TARTRATE 25 MG: 25 TABLET, FILM COATED ORAL at 10:12

## 2022-06-02 RX ADMIN — SODIUM CHLORIDE, PRESERVATIVE FREE 10 ML: 5 INJECTION INTRAVENOUS at 10:17

## 2022-06-02 RX ADMIN — DRONEDARONE 400 MG: 400 TABLET, FILM COATED ORAL at 10:12

## 2022-06-02 ASSESSMENT — PAIN SCALES - GENERAL: PAINLEVEL_OUTOF10: 0

## 2022-06-02 NOTE — PROGRESS NOTES
Physician Progress Note      Adriel Rivera  CSN #:                  [id number]  :                       1950  ADMIT DATE:       2022 4:35 PM  [address] DATE:  RESPONDING  PROVIDER #:        Jose Juan Duenas MD          QUERY TEXT:    Patient admitted with weakness, fatigue noted to have atrial fibrillation. If   possible, please document in progress notes and discharge summary further   specificity regarding the type of atrial fibrillation: The medical record reflects the following:  Risk Factors: A fib  Clinical Indicators: H&P  documented need for ablation - unable to make   appointment. She states that she has had a-fib \"for a long time\" and had an   ablation in  with Dr. Diamond Coto. on home Xarelto  Treatment: amiodarone drip, Cardioversion, Cardiology consulted. Chronic: nonspecific term that could be referring to paroxysmal, persistent,   or permanent  Longstanding persistent: persistent and continuous, lasting > 1 year. Paroxysmal - self-terminating or intermittent; resolves with or without   intervention within 7 days of onset; may recur with various frequency. Persistent - Fails to terminate within 7 days; Often requires meds or   cardioversion to restore to NSR. Permanent - longstanding & persistent; Medication has been ineffective in   restoring NSR &/or cardioversion is contraindicated    Definitions per [employer] Training Guide and Quick Reference Guide, Denise Cox 112 5   Diseases and Disorders of the Circulatory System; 2019; [employer]. Software content   from the [employer]?  Advanced [employer] Transformation Program  Options provided:  -- Paroxysmal Atrial Fibrillation  -- Longstanding Persistent Atrial Fibrillation  -- Permanent Atrial Fibrillation  -- Persistent Atrial Fibrillation  -- Chronic Atrial Fibrillation, unspecified  -- Other - I will add my own diagnosis  -- Disagree - Not applicable / Not valid  -- Disagree - Clinically unable to determine / Unknown  -- Refer to Clinical Documentation Reviewer    PROVIDER RESPONSE TEXT:    This patient has paroxysmal atrial fibrillation.     Query created by: Pierre Schroeder on 6/2/2022 9:46 AM      Electronically signed by:  Richi Mota MD 6/2/2022 3:06 PM

## 2022-06-02 NOTE — PROGRESS NOTES
Section of Cardiology  Progress Note      Date:  6/2/2022  Patient: Suzan Santizo  Admission:  5/30/2022  4:35 PM  Admit DX: DELIA (acute kidney injury) ([facility] [address] [N17.9]  Atrial fibrillation with rapid ventricular response (HCC) [I48.91]  Atrial fibrillation with RVR ([address] [I48.91]  Elevated brain natriuretic peptide (BNP) level [R79.89]  Age:  67 y. o., 1950     LOS: 3 days     Reason for evaluation:   pafib      SUBJECTIVE:     The patient was seen and examined. Notes and labs reviewed. Pt had successful DCCV yesterday with return of NSR  Started on Multaq after for continued maintenance  Currently, she remains in NSR  She still feels SOB. Worse on laying flat. Needed O2 last night  EKG showing QTc 521  No CP, edema orthopnea      OBJECTIVE:      EXAM:   Vitals:    VITALS:  /86   Pulse 64   Temp (!) 96.5 °F (35.8 °C) (Temporal)   Resp 18   Ht 5' 8\" (1.727 m)   Wt 137 lb (62.1 kg)   SpO2 97%   BMI 20.83 kg/m²   24HR INTAKE/OUTPUT:      Intake/Output Summary (Last 24 hours) at 6/2/2022 0816  Last data filed at 6/1/2022 2336  Gross per 24 hour   Intake 700 ml   Output 1000 ml   Net -300 ml       CONSTITUTIONAL:  awake, alert, cooperative, no apparent distress, and appears stated age. HEENT: Normal jugular venous pulsations, no carotid bruits. LUNGS: Good respiratory effort On auscultation: clear to auscultation bilaterally  CARDIOVASCULAR:  Normal apical impulse, regular rate and rhythm, normal S1 and S2, no S3 or S4, and no murmur or rub noted. ABDOMEN: Soft, nontender, nondistended. SKIN: Warm and dry. EXTREMITIES:No lower extremity edema.      Current Inpatient Medications:   sodium chloride flush  5-40 mL IntraVENous 2 times per day    dronedarone hcl  400 mg Oral BID WC    metoprolol tartrate  25 mg Oral BID    aspirin  81 mg Oral Daily    furosemide  20 mg Oral Daily    therapeutic multivitamin-minerals  1 tablet Oral Daily    rivaroxaban  20 mg Oral Dinner    sodium

## 2022-06-02 NOTE — PLAN OF CARE
Problem: Chronic Conditions and Co-morbidities  Goal: Patient's chronic conditions and co-morbidity symptoms are monitored and maintained or improved  Outcome: Progressing     Problem: Discharge Planning  Goal: Discharge to home or other facility with appropriate resources  Outcome: Progressing     Problem: Safety - Adult  Goal: Free from fall injury  Outcome: Progressing     Problem: Pain  Goal: Verbalizes/displays adequate comfort level or baseline comfort level  Outcome: Progressing

## 2022-06-02 NOTE — PROGRESS NOTES
[facility]  Office: [address], DO, Sheryl Rapp, DO, Richy Rios, DO, Mckenzie Red Blood, DO, Aneesh Capps MD, Mikal España MD, Sofi Albright MD, Paula Heard MD, Kenyon Lazar MD, Melly Grant MD, Lisy Thakkar MD, Ziggy Ospina, DO, Nicolás Bolton, DO, Vandana Lake MD,  Bala Carter, DO, Aden Otto MD, Jonathan Reddy MD, Abdulaziz Cormier MD, Iggy Graham DO, Nakia Oshea MD, Shruthi Escoto MD, Trina Cristina MD, Juan Antonioluzmaria Lopez [facility], [facility], CNP, Gabe Wyatt, CNP, Marisa Hart, CNP, Klever Mckinney, CNP, Ronald Castellanos, CNP, JOSEPH IsabelC, Nataliia Jane, [facility], Ramon Good, [facility], Nawaf Wilks, CNP, Kenyon Jon, CNP, Ebony Liriano, CNS, Shaylee Torres, [facility], Jose Dover, CNP, Jaun Begum, CNP, Kayli Rodríguez, [facility]    Progress Note    6/2/2022    8:29 AM    Name:   Hannah Figueroa  MRN:     [id number]     Acct:      [de-identified]   Room:   2042/204201   Day:  3  Admit Date:  5/30/2022  4:35 PM    PCP:   Yoko De  Code Status:  Full Code    Subjective:     C/C:   Chief Complaint   Patient presents with    Shortness of Breath     Pt reports SOB for several weeks; hx of afib and needs ablation; pt reports she hasn't been able to get an appointment in over a month; diagnosed with Covid on 5/10     Atrial Fibrillation     Interval History Status: not changed. patietn seen and examined. Shortness of breath seems improved in sinus bradycardia appears on tele. Patient otheriwswe doing ok, no other complaints, in bed today, which is the first time this admission. Brief History:     67year old female with past medical history of afib with rvr, CKD, chronic CHF, pulmonary hypertension, presents with shortness of breath, found to be in afib with RVR. Has history of EP study with cryoablation on 11/1/2021.        Echo 4/13/22:  Left ventricle is normal in size  Global left ventricular systolic function is moderately reduced  Estimated ejection fraction is 40 % . Left atrium is moderate to severely dilated. Right atrium is moderately dilated . Normal mitral valve structure and function. Mild to moderate mitral regurgitation. Multiple MR jets noted. Mild to moderate tricuspid regurgitation. Severe pulmonary hypertension. No pericardial effusion seen. Normal aortic root dimension. Review of Systems:     Constitutional:  negative for chills, fevers, sweats  Respiratory:  negative for cough, dyspnea on exertion, shortness of breath, wheezing  Cardiovascular:  negative for chest pain, chest pressure/discomfort, lower extremity edema, palpitations  Gastrointestinal:  negative for abdominal pain, constipation, diarrhea, nausea, vomiting  Neurological:  negative for dizziness, headache    Medications:      Allergies:  No Known Allergies    Current Meds:   Scheduled Meds:    sodium chloride flush  5-40 mL IntraVENous 2 times per day    dronedarone hcl  400 mg Oral BID WC    metoprolol tartrate  25 mg Oral BID    aspirin  81 mg Oral Daily    furosemide  20 mg Oral Daily    therapeutic multivitamin-minerals  1 tablet Oral Daily    rivaroxaban  20 mg Oral Dinner    sodium chloride flush  5-40 mL IntraVENous 2 times per day     Continuous Infusions:    sodium chloride      sodium chloride       PRN Meds: guaiFENesin, sodium chloride flush, sodium chloride, fentanNYL, HYDROmorphone, benzonatate, sodium chloride flush, sodium chloride, ondansetron **OR** [DISCONTINUED] ondansetron, polyethylene glycol, acetaminophen **OR** acetaminophen, potassium chloride **OR** potassium alternative oral replacement **OR** potassium chloride, magnesium sulfate    Data:     Past Medical History:   has a past medical history of Anxiety, Arthritis, Asthmatic bronchitis, unspecified asthma severity, uncomplicated, Atrial fibrillation ([address] Dyspnea on exertion, GERD (gastroesophageal reflux disease), Hyperlipidemia, Hypertension, Mitral valve disorder, Moderate to severe pulmonary hypertension (HCC), Osteoarthritis, Palpitation, Physical deconditioning, Shortness of breath, Sleep apnea, Spinal stenosis at L4-L5 level, Stage 3b chronic kidney disease ([address] Tachycardia, and Tricuspid regurgitation. Social History:   reports that she has never smoked. She has never used smokeless tobacco. She reports current alcohol use of about 5.0 standard drinks of alcohol per week. She reports that she does not use drugs. Family History:   Family History   Problem Relation Age of Onset    High Blood Pressure Mother     Arthritis Mother     Diabetes Father     Arthritis Sister        Vitals:  BP (!) 99/57   Pulse 61   Temp 97.3 °F (36.3 °C) (Temporal)   Resp 18   Ht 5' 8\" (1.727 m)   Wt 137 lb (62.1 kg)   SpO2 100%   BMI 20.83 kg/m²   Temp (24hrs), Av.3 °F (36.3 °C), Min:96.5 °F (35.8 °C), Max:98 °F (36.7 °C)    No results for input(s): POCGLU in the last 72 hours. I/O (24Hr):     Intake/Output Summary (Last 24 hours) at 2022 0829  Last data filed at 2022 2336  Gross per 24 hour   Intake 700 ml   Output 1000 ml   Net -300 ml       Labs:  Hematology:  Recent Labs     22  0755   WBC 6.0 5.9   RBC 4.00 4.04   HGB 10.8* 10.7*   HCT 36.2* 36.3   MCV 90.5 89.9   MCH 27.0 26.5   MCHC 29.8 29.5   RDW 17.6* 17.6*    253   MPV 9.9 10.0     Chemistry:  Recent Labs     22  0755     --  138   K 4.0  --  4.1     --  101   CO2 26  --  23   GLUCOSE 123*  --  132*   BUN 30*  --  28*   CREATININE 1.28*  --  1.40*   MG  --  2.0 2.0   ANIONGAP 13  --  14   LABGLOM 41*  --  37*   GFRAA 50*  --  45*   CALCIUM 9.6  --  9.5   PROBNP 9,616*  --   --    TROPHS 31*  --   --      Recent Labs     22  1646 22  0755   PROT 7.2 6.9   LABALBU 4.1 4.0   TSH 2.11  --    AST 18 24   ALT 19 25   ALKPHOS 101 121* BILITOT 0.58 0.56     ABG:  Lab Results   Component Value Date    POCPH 7.433 05/16/2018    POCPCO2 38.3 05/16/2018    POCPO2 78.9 05/16/2018    POCHCO3 25.6 05/16/2018    NBEA NOT REPORTED 05/16/2018    PBEA 1 05/16/2018    QWP8SPQ 27 05/16/2018    PHBP6SQI 96 05/16/2018    FIO2 NOT REPORTED 05/16/2018     Lab Results   Component Value Date/Time    SPECIAL NOT REPORTED 01/30/2015 10:44 AM     No results found for: CULTURE    Radiology:  XR CHEST 1 VIEW    Result Date: 5/30/2022  Stable cardiomegaly. Mild pulmonary vascular congestion. Physical Examination:        General appearance:  alert, cooperative and no distress  Mental Status:  oriented to person, place and time and normal affect  Lungs:  clear to auscultation bilaterally, normal effort  Heart:  Bradycardic, systolic murmur  Abdomen:  soft, nontender, nondistended, normal bowel sounds, no masses, hepatomegaly, splenomegaly  Extremities:  no edema, redness, tenderness in the calves  Skin:  no gross lesions, rashes, induration    Assessment:        Hospital Problems           Last Modified POA    * (Principal) Atrial fibrillation with rapid ventricular response ([address] 5/30/2022 Yes    Moderate to severe pulmonary hypertension ([address] 5/31/2022 Yes    Chronic combined systolic and diastolic CHF (congestive heart failure) ([address] 5/30/2022 Yes    Acute kidney injury superimposed on CKD ([address] 5/30/2022 Yes    Essential hypertension 5/30/2022 Yes          Plan:        1. afib with RVR with history of cryoablation. Status post cardioversion 6/1/22. recommendations, metoprolol 25 mg BID, dronadrenone started. Follow up EKG  2. HTN, CKD, lisinopriol, lasix,   3. Anemmia suspect due to chronic disease, borderline iron saturation, follow up with PCP  4. PT/OT  5.  Discharge planning if ok with Cardiology    Sindy Panchal MD  6/2/2022  8:29 AM

## 2022-06-03 ENCOUNTER — APPOINTMENT (OUTPATIENT)
Dept: ULTRASOUND IMAGING | Age: 72
DRG: 309 | End: 2022-06-03
Payer: MEDICARE

## 2022-06-03 VITALS
DIASTOLIC BLOOD PRESSURE: 60 MMHG | HEART RATE: 55 BPM | BODY MASS INDEX: 21.37 KG/M2 | HEIGHT: 68 IN | OXYGEN SATURATION: 96 % | WEIGHT: 141 LBS | SYSTOLIC BLOOD PRESSURE: 105 MMHG | RESPIRATION RATE: 20 BRPM | TEMPERATURE: 97.7 F

## 2022-06-03 LAB
ANION GAP SERPL CALCULATED.3IONS-SCNC: 12 MMOL/L (ref 9–17)
BILIRUBIN URINE: NEGATIVE
BUN BLDV-MCNC: 45 MG/DL (ref 8–23)
BUN/CREAT BLD: 25 (ref 9–20)
CALCIUM SERPL-MCNC: 9.1 MG/DL (ref 8.6–10.4)
CHLORIDE BLD-SCNC: 103 MMOL/L (ref 98–107)
CO2: 25 MMOL/L (ref 20–31)
COLOR: YELLOW
CREAT SERPL-MCNC: 1.8 MG/DL (ref 0.5–0.9)
CREAT SERPL-MCNC: 1.83 MG/DL (ref 0.5–0.9)
CREATININE URINE: 62.1 MG/DL (ref 28–217)
CREATININE URINE: 62.8 MG/DL (ref 28–217)
EOSINOPHIL,URINE: NORMAL
GFR AFRICAN AMERICAN: 33 ML/MIN
GFR AFRICAN AMERICAN: 33 ML/MIN
GFR NON-AFRICAN AMERICAN: 27 ML/MIN
GFR NON-AFRICAN AMERICAN: 28 ML/MIN
GFR SERPL CREATININE-BSD FRML MDRD: ABNORMAL ML/MIN/{1.73_M2}
GFR SERPL CREATININE-BSD FRML MDRD: ABNORMAL ML/MIN/{1.73_M2}
GLUCOSE BLD-MCNC: 112 MG/DL (ref 70–99)
GLUCOSE URINE: NEGATIVE
KETONES, URINE: NEGATIVE
LEUKOCYTE ESTERASE, URINE: NEGATIVE
NITRITE, URINE: NEGATIVE
OSMOLALITY URINE: 459 MOSM/KG (ref 300–1170)
PH UA: 5.5 (ref 5–8)
POTASSIUM SERPL-SCNC: 4.3 MMOL/L (ref 3.7–5.3)
PROTEIN UA: NEGATIVE
SODIUM BLD-SCNC: 140 MMOL/L (ref 135–144)
SODIUM,UR: 82 MMOL/L
SPECIFIC GRAVITY UA: 1.01 (ref 1–1.03)
TOTAL PROTEIN, URINE: 8 MG/DL
TURBIDITY: CLEAR
URINE HGB: NEGATIVE
URINE TOTAL PROTEIN CREATININE RATIO: 0.13 (ref 0–0.2)
UROBILINOGEN, URINE: NORMAL

## 2022-06-03 PROCEDURE — 87205 SMEAR GRAM STAIN: CPT

## 2022-06-03 PROCEDURE — 80048 BASIC METABOLIC PNL TOTAL CA: CPT

## 2022-06-03 PROCEDURE — 83935 ASSAY OF URINE OSMOLALITY: CPT

## 2022-06-03 PROCEDURE — 82570 ASSAY OF URINE CREATININE: CPT

## 2022-06-03 PROCEDURE — 81003 URINALYSIS AUTO W/O SCOPE: CPT

## 2022-06-03 PROCEDURE — 6370000000 HC RX 637 (ALT 250 FOR IP): Performed by: STUDENT IN AN ORGANIZED HEALTH CARE EDUCATION/TRAINING PROGRAM

## 2022-06-03 PROCEDURE — 84166 PROTEIN E-PHORESIS/URINE/CSF: CPT

## 2022-06-03 PROCEDURE — 6370000000 HC RX 637 (ALT 250 FOR IP): Performed by: NURSE PRACTITIONER

## 2022-06-03 PROCEDURE — 84300 ASSAY OF URINE SODIUM: CPT

## 2022-06-03 PROCEDURE — 99239 HOSP IP/OBS DSCHRG MGMT >30: CPT | Performed by: STUDENT IN AN ORGANIZED HEALTH CARE EDUCATION/TRAINING PROGRAM

## 2022-06-03 PROCEDURE — 93005 ELECTROCARDIOGRAM TRACING: CPT | Performed by: STUDENT IN AN ORGANIZED HEALTH CARE EDUCATION/TRAINING PROGRAM

## 2022-06-03 PROCEDURE — 2580000003 HC RX 258: Performed by: NURSE PRACTITIONER

## 2022-06-03 PROCEDURE — 84156 ASSAY OF PROTEIN URINE: CPT

## 2022-06-03 RX ORDER — AMIODARONE HYDROCHLORIDE 200 MG/1
200 TABLET ORAL DAILY
Qty: 30 TABLET | Refills: 0 | Status: SHIPPED | OUTPATIENT
Start: 2022-06-04 | End: 2022-07-04

## 2022-06-03 RX ADMIN — AMIODARONE HYDROCHLORIDE 200 MG: 200 TABLET ORAL at 09:14

## 2022-06-03 RX ADMIN — METOPROLOL TARTRATE 25 MG: 25 TABLET, FILM COATED ORAL at 09:14

## 2022-06-03 RX ADMIN — FUROSEMIDE 20 MG: 20 TABLET ORAL at 09:14

## 2022-06-03 RX ADMIN — SODIUM CHLORIDE, PRESERVATIVE FREE 10 ML: 5 INJECTION INTRAVENOUS at 09:14

## 2022-06-03 RX ADMIN — ASPIRIN 81 MG: 81 TABLET, COATED ORAL at 09:14

## 2022-06-03 NOTE — CARE COORDINATION
Discharge planning    Patient declines any skilled needs. Plan is home independently. Patient is scheduled with anticoagulation management. Continue to follow.

## 2022-06-03 NOTE — PROGRESS NOTES
[facility]  Office: [address], DO, Zeynep Dyer, DO, Veda Jaffe, DO, John Calvillo, DO, Nehemias Orozco MD, Evert Rhoades MD, Holly Stevens MD, Federico Tenorio MD, Juan Thorpe MD, Sharad Conner MD, Zoe Calles MD, Jimena Rivas, DO, Reid Wheeler, DO, Georges Chawla MD,  Tosha Boykin, DO, Emre Romero MD, Ruth Cespedes MD, Alexis Godfrey MD, Stephen Sequeira DO, Malachi Pringle MD, Es Cano MD, Hay Tee MD, Mario Hines, [facility], [facility], CNP, Felicia Johnson, CNP, Jenny Alcantara, CNP, Jimmy Nuno, [facility], Julius Whitt, CNP, Itzel Wise PAMajo[facility], Karie Zhao, DNP, Ting Aparicio, CNP, Jacklyn Frankel, CNP, Lalit Valdez, [facility], Jami Mac, [facility], Hugo Phoenix, [facility], Kvng Bentley, CNP, Marie Bennett, CNP, Cj Purdy, [facility]    Progress Note    6/3/2022    3:34 PM    Name:   Zenia Valerio  MRN:     [id number]     Acct:      [de-identified]   Room:   2042/2042-01  IP Day:  4  Admit Date:  5/30/2022  4:35 PM    PCP:   Letitia Momin  Code Status:  Full Code    Subjective:     C/C:   Chief Complaint   Patient presents with    Shortness of Breath     Pt reports SOB for several weeks; hx of afib and needs ablation; pt reports she hasn't been able to get an appointment in over a month; diagnosed with Covid on 5/10     Atrial Fibrillation     Interval History Status: not changed. patietn seen and examined. Creatinine worse after iv lasix    Brief History:     67year old female with past medical history of afib with rvr, CKD, chronic CHF, pulmonary hypertension, presents with shortness of breath, found to be in afib with RVR. Has history of EP study with cryoablation on 11/1/2021. Echo 4/13/22:  Left ventricle is normal in size  Global left ventricular systolic function is moderately reduced  Estimated ejection fraction is 40 % .   Left atrium is moderate to severely dilated. Right atrium is moderately dilated . Normal mitral valve structure and function. Mild to moderate mitral regurgitation. Multiple MR jets noted. Mild to moderate tricuspid regurgitation. Severe pulmonary hypertension. No pericardial effusion seen. Normal aortic root dimension. Review of Systems:     Constitutional:  negative for chills, fevers, sweats  Respiratory:  negative for cough, dyspnea on exertion, shortness of breath, wheezing  Cardiovascular:  negative for chest pain, chest pressure/discomfort, lower extremity edema, palpitations  Gastrointestinal:  negative for abdominal pain, constipation, diarrhea, nausea, vomiting  Neurological:  negative for dizziness, headache    Medications:      Allergies:  No Known Allergies    Current Meds:   Scheduled Meds:    rivaroxaban  15 mg Oral Dinner    amiodarone  200 mg Oral Daily    sodium chloride flush  5-40 mL IntraVENous 2 times per day    metoprolol tartrate  25 mg Oral BID    aspirin  81 mg Oral Daily    furosemide  20 mg Oral Daily    therapeutic multivitamin-minerals  1 tablet Oral Daily    sodium chloride flush  5-40 mL IntraVENous 2 times per day     Continuous Infusions:    sodium chloride      sodium chloride       PRN Meds: guaiFENesin, sodium chloride flush, sodium chloride, benzonatate, sodium chloride flush, sodium chloride, ondansetron **OR** [DISCONTINUED] ondansetron, polyethylene glycol, acetaminophen **OR** acetaminophen, potassium chloride **OR** potassium alternative oral replacement **OR** potassium chloride, magnesium sulfate    Data:     Past Medical History:   has a past medical history of Anxiety, Arthritis, Asthmatic bronchitis, unspecified asthma severity, uncomplicated, Atrial fibrillation (HCC), Dyspnea on exertion, GERD (gastroesophageal reflux disease), Hyperlipidemia, Hypertension, Mitral valve disorder, Moderate to severe pulmonary hypertension ([address] Osteoarthritis, Palpitation, Physical deconditioning, Shortness of breath, Sleep apnea, Spinal stenosis at L4-L5 level, Stage 3b chronic kidney disease ([address] Tachycardia, and Tricuspid regurgitation. Social History:   reports that she has never smoked. She has never used smokeless tobacco. She reports current alcohol use of about 5.0 standard drinks of alcohol per week. She reports that she does not use drugs. Family History:   Family History   Problem Relation Age of Onset    High Blood Pressure Mother     Arthritis Mother     Diabetes Father     Arthritis Sister        Vitals:  /60   Pulse 55   Temp 97.7 °F (36.5 °C) (Temporal)   Resp 20   Ht 5' 8\" (1.727 m)   Wt 141 lb (64 kg)   SpO2 96%   BMI 21.44 kg/m²   Temp (24hrs), Av.9 °F (36.6 °C), Min:97.7 °F (36.5 °C), Max:98.1 °F (36.7 °C)    No results for input(s): POCGLU in the last 72 hours. I/O (24Hr): Intake/Output Summary (Last 24 hours) at 6/3/2022 1534  Last data filed at 6/3/2022 1303  Gross per 24 hour   Intake 480 ml   Output 1900 ml   Net -1420 ml       Labs:  Hematology:  No results for input(s): WBC, RBC, HGB, HCT, MCV, MCH, MCHC, RDW, PLT, MPV, SEDRATE, CRP, INR, DDIMER, SP6LHYHY, LABABSO in the last 72 hours. Invalid input(s): PT  Chemistry:  Recent Labs     22  0507      K 4.3      CO2 25   GLUCOSE 112*   BUN 45*   CREATININE 1.83*  1.80*   ANIONGAP 12   LABGLOM 27*  28*   GFRAA 33*  33*   CALCIUM 9.1     No results for input(s): PROT, LABALBU, LABA1C, J6OOVLW, V1UCLZK, FT4, TSH, AST, ALT, LDH, GGT, ALKPHOS, LABGGT, BILITOT, BILIDIR, AMMONIA, AMYLASE, LIPASE, LACTATE, CHOL, HDL, LDLCHOLESTEROL, CHOLHDLRATIO, TRIG, VLDL, AMX63XL, PHENYTOIN, PHENYF, URICACID, POCGLU in the last 72 hours.   ABG:  Lab Results   Component Value Date    POCPH 7.433 2018    POCPCO2 38.3 2018    POCPO2 78.9 2018    POCHCO3 25.6 2018    NBEA NOT REPORTED 2018    PBEA 1 2018    PYO9RPT 27 2018    RFBP6IKU 96 2018    FIO2 NOT REPORTED 05/16/2018     Lab Results   Component Value Date/Time    SPECIAL NOT REPORTED 01/30/2015 10:44 AM     No results found for: CULTURE    Radiology:  XR CHEST 1 VIEW    Result Date: 5/30/2022  Stable cardiomegaly. Mild pulmonary vascular congestion. Physical Examination:        General appearance:  alert, cooperative and no distress  Mental Status:  oriented to person, place and time and normal affect  Lungs:  clear to auscultation bilaterally, normal effort  Heart:  Bradycardic, systolic murmur  Abdomen:  soft, nontender, nondistended, normal bowel sounds, no masses, hepatomegaly, splenomegaly  Extremities:  no edema, redness, tenderness in the calves  Skin:  no gross lesions, rashes, induration    Assessment:        Hospital Problems           Last Modified POA    * (Principal) Atrial fibrillation with rapid ventricular response ([address] 5/30/2022 Yes    Moderate to severe pulmonary hypertension ([address] 5/31/2022 Yes    Chronic combined systolic and diastolic CHF (congestive heart failure) ([address] 5/30/2022 Yes    Acute kidney injury superimposed on CKD ([address] 5/30/2022 Yes    Essential hypertension 5/30/2022 Yes          Plan:        1. afib with RVR with history of cryoablation. Status post cardioversion 6/1/22. recommendations, metoprolol 25 mg BID, amiodarone started  2. HTN, CKD, lisinopriol, lasix held,   3. Anemmia suspect due to chronic disease, borderline iron saturation, follow up with PCP  4. PT/OT  5.  Discharge planning today, follow up nephrology and cardiology as outpatient    Jayesh De La O MD  6/3/2022  3:34 PM

## 2022-06-03 NOTE — PROGRESS NOTES
Section of Cardiology  Progress Note      Date:  6/3/2022  Patient: Victory Grime  Admission:  5/30/2022  4:35 PM  Admit DX: DELIA (acute kidney injury) ([facility] [N17.9]  Atrial fibrillation with rapid ventricular response (HCC) [I48.91]  Atrial fibrillation with RVR ([address] [I48.91]  Elevated brain natriuretic peptide (BNP) level [R79.89]  Age:  67 y. o., 1950     LOS: 4 days     Reason for evaluation:   pafib      SUBJECTIVE:     The patient was seen and examined. Notes and labs reviewed. Pt had successful DCCV with return of NSR on Wednesday  Remains in NSR  Unfortunately, Creatinine has worsened to 1.80  Pt reports she diuresed well with lasix yesterday and actually feels a lot better breathingwise today. Hasn't needed O2 and wants to go home  No CP, palpitations, in NSR, QTc improved with stopping Multaq      OBJECTIVE:      EXAM:   Vitals:    VITALS:  /75   Pulse 80   Temp 97.6 °F (36.4 °C) (Temporal)   Resp 18   Ht 5' 8\" (1.727 m)   Wt 141 lb (64 kg)   SpO2 96%   BMI 21.44 kg/m²   24HR INTAKE/OUTPUT:      Intake/Output Summary (Last 24 hours) at 6/3/2022 0815  Last data filed at 6/2/2022 2000  Gross per 24 hour   Intake --   Output 1000 ml   Net -1000 ml       CONSTITUTIONAL:  awake, alert, cooperative, no apparent distress, and appears stated age. HEENT: Normal jugular venous pulsations, no carotid bruits. LUNGS: Good respiratory effort On auscultation: clear to auscultation bilaterally  CARDIOVASCULAR:  Normal apical impulse, regular rate and rhythm, normal S1 and S2, no S3 or S4, and no murmur or rub noted. ABDOMEN: Soft, nontender, nondistended. SKIN: Warm and dry. EXTREMITIES:No lower extremity edema.      Current Inpatient Medications:   rivaroxaban  15 mg Oral Dinner    amiodarone  200 mg Oral Daily    sodium chloride flush  5-40 mL IntraVENous 2 times per day    metoprolol tartrate  25 mg Oral BID    aspirin  81 mg Oral Daily    furosemide  20 mg Oral Daily    therapeutic multivitamin-minerals  1 tablet Oral Daily    sodium chloride flush  5-40 mL IntraVENous 2 times per day       IV Infusions (if any):   sodium chloride      sodium chloride         Diagnostics:   Telemetry: Afib rates 110      Labs:   CBC:  No results for input(s): WBC, HGB, HCT, PLT in the last 72 hours. Magnesium:  No results for input(s): MG in the last 72 hours. BMP:  Recent Labs     06/03/22  0507   CREATININE 1.80*   LABGLOM 28*     BNP:  No results for input(s): BNP, PROBNP in the last 72 hours. PT/INR:No results for input(s): PROTIME, INR in the last 72 hours. APTT:No results for input(s): APTT in the last 72 hours. CARDIAC ENZYMES:  No results for input(s): MYOGLOBIN, CKTOTAL, CKMB, CKMBINDEX, TROPHS, TROPONINT in the last 72 hours. FASTING LIPID PANEL:No results found for: HDL, LDLDIRECT, LDLCALC, TRIG  LIVER PROFILE:  No results for input(s): AST, ALT, LABALBU, ALKPHOS, BILITOT, BILIDIR, IBILI, PROT, GLOB, ALBUMIN in the last 72 hours. ASSESSMENT:  · Atrial Fibrillation with RVR  Hx of recent ablation Nov 2021  On Xarelto. Started on Multaq  · Prolonged QTc on Multaq  Stopped and transitioned to Amiodarone  · Acute on Chronic Systolic Heart Failure  EF 40% with PHTN on ECHO 4/2022  · Acute on Chronic Kidney Disease  0.88 - >1.2 -> 1.80  · On chronic Anticoagulation (Xarelto)  · Hypertension  · Hyperlipidemia  · JOSE (not on CPAP)      PLAN:  1. Continue current medications. 2. Cont Xarelto 20mg (dose reductions per nephrology) and Metoprolol 25mg PO BID  Pharmacy will be dosing her Xarelto due to renal dysfunction  3. Cont Amio 200mg PO OD  4. Hold lasix and consult nephrology for DELIA  5. Will follow      Discussed with patient and nursing.     Suzan Santizo MD

## 2022-06-03 NOTE — FLOWSHEET NOTE
RN reviewed discharge instructions with the patient at the bedside. Patient verbalizes understanding. Awaiting meds-to-beds to deliver patient's medication at this time.

## 2022-06-03 NOTE — PROGRESS NOTES
Physician Progress Note      PATIENTYates   CSN #:                  [id number]  :                       1950  ADMIT DATE:       2022 4:35 PM  [address] [place] DATE:  RESPONDING  PROVIDER #:        Kevin Krishnamurthy MD          QUERY TEXT:    Patient admitted with paroxysmal A fib . Noted documentation of Chronic   combined systolic diastolic CHF in H&P 3/17, PN , , . by primary and   acute on chronic Systolic CHF by cardiology in consult not  and   subsequent PN , . If possible, please document in progress notes and discharge summary if you   are evaluating and /or treating any of the following: The medical record reflects the following:  Risk Factors: HTN , A fib , hyperlipidemia  Clinical Indicators: ECHO 6680 systolic moderately reduced, EF 40% , Mild to   moderate MR, mild to moderate TR.  BNP 9616,  CXR: Stable   cardiomegaly. Mild pulmonary vascular congestion. Treatment: Lasix 40 mg IV x1, Lasix 20 mg PO , telemetry monitoring,   cardiology consulted  Options provided:  -- Chronic combined systolic diastolic CHF confirmed and acute on chronic   Systolic CHF ruled out  -- acute on chronic Systolic CHF confirmed and  Chronic combined systolic   diastolic CHF ruled out  -- Other - I will add my own diagnosis  -- Disagree - Not applicable / Not valid  -- Disagree - Clinically unable to determine / Unknown  -- Refer to Clinical Documentation Reviewer    PROVIDER RESPONSE TEXT:    After study, Chronic combined systolic diastolic CHF confirmed and acute on   chronic Systolic CHF ruled out.     Query created by: Samantha Alejandro on 6/3/2022 12:55 PM      Electronically signed by:  Kevin Krishnamurthy MD 6/3/2022 2:10 PM

## 2022-06-03 NOTE — FLOWSHEET NOTE
RN paged Dr. Scottie Gregorio regarding new consult for CKD due to overdiuresis, cardiology requesting. Awaiting response at this time. Will continue to monitor closely.

## 2022-06-03 NOTE — CARE COORDINATION
met with patient at bedside to sign new IMM letter. Initial letter was signed on 5/30/22 at 1800. Incorrect patient sticker on initial form.

## 2022-06-03 NOTE — FLOWSHEET NOTE
Dr. Fernando Jaramillo deferred to Dr. Ran Johnson group as patient states she has seen Dr. Florence Andersen in the past. RN notified Dr. Mark Ramirez of new consult for CKD due to overdiuresis, cardiology requesting. Awaiting response at this time. Will continue to monitor closely.

## 2022-06-03 NOTE — FLOWSHEET NOTE
Dr. Alexandra Rainey arrived to the patient's bedside for evaluation and was updated on the patient's current status via RN. Recommendation for nephrology consult; however defer to attending provider. Will continue to monitor closely.

## 2022-06-03 NOTE — FLOWSHEET NOTE
RN notified Dr. Charmian Councilman that nephrology and cardiology have signed off. Patient is okay for discharge home.

## 2022-06-03 NOTE — PROGRESS NOTES
CLINICAL PHARMACY NOTE: MEDS TO BEDS    Total # of Prescriptions Filled: 1   The following medications were delivered to the patient:  · Amiodarone 200 mg tabs    Additional Documentation:    Delivered to pt's room 6/3/22. $18.98 copay, pt paid with credit card. Note: Rx was initially sent to [facility], but we transferred it here since the patient wanted Meds to [facility].

## 2022-06-03 NOTE — DISCHARGE SUMMARY
[facility]  Office: [address], DO, Jose Martin Gear, DO, Jewell Field, DO, Joel Amanda Blood, DO, Emre Presley MD, Wilner Dubon MD, Evelia Cohen MD, Vinnie Nichole MD, Nallely Avalos MD, Angela Spring MD, Jimena Swain MD, Regino Harper, DO, Lilian Elizalde MD,  Teresa Mccurdy, DO, Juany Irving MD, Cyndie James MD, Kendra Krueger MD, Esdras Dover, DO, Jayleen Bass MD, Priyanka Gonsales MD, Jeniffer Fitzgerald, DO, Sherlyn Cameron MD, Clement Villa, CNP, [facility] Corrineoss, CNP, Mendoza Grey, CNP, Kelsey Can, CNP, Heidi Fraser, CNP, Toan Flavin, CNP, Lendel Nissen, PA-C, Edluiza Blood, DNP, Nafisa Lawson, CNP, Perez Punch, CNP, Terri Fly, CNP, Flor Mask, CNS, Tammy [address], DNP, Luzma Postal, CNP, Lois Foley, CNP, Ashley Zapata, [facility]    Discharge Summary     Patient ID: Adina Hamm  :  1950   MRN: [id number]     ACCOUNT:  [de-identified]   Patient's PCP: Steph Irving  Admit Date: 2022   Discharge Date: 6/3/2022     Length of Stay: 4  Code Status:  Full Code  Admitting Physician: Merrill Mitchell MD  Discharge Physician: Lilian Elizalde MD     Active Discharge Diagnoses:     Hospital Problem Lists:  Principal Problem:    Atrial fibrillation with rapid ventricular response [facility])  Active Problems: Moderate to severe pulmonary hypertension (HCC)    Chronic combined systolic and diastolic CHF (congestive heart failure) ([facility]    Acute kidney injury superimposed on CKD [facility])    Essential hypertension  Resolved Problems:    * No resolved hospital problems.  *      Admission Condition:  stable     Discharged Condition: stable    Hospital Stay:     Hospital Course:  Adina Hamm is a 67 y.o. female who was admitted for the management of   Atrial fibrillation with rapid ventricular response ([facility] , presented to ER with Shortness of Breath (Pt reports SOB for several weeks; hx of afib and needs ablation; pt reports she hasn't been able to get an appointment in over a month; diagnosed with Covid on 5/10 ) and Atrial Fibrillation    67year old female with past medical history of afib with rvr, CKD, chronic CHF, pulmonary hypertension, presents with shortness of breath, found to be in afib with RVR. Has history of EP study with cryoablation on 11/1/2021. Patient underwent Cardioversion on 6/1/2022, and unfortunately had worsening renal function. Paitent's lasix held, repeat BMP on Monday and follow up with nephrology as yumiko. Patient also started on amiodarone.       Procedure: The patient was placed in the supine position and the chest area was exposed. The cardioversion pads were applied in the standard manner and configuration, the defibrillator was set on the synchronous mode and charged to 200  Joules,  and synchronized shock was delivered with successful termination of Atrial fibrillation and sinus rhythm was restored.     The patient tolerated the procedure well and is currently recovering from anesthesia.     Complications: None     Conclusion: Successful synchronized electrial cardioversion wiith restoration of sinus rhythm      Echo 4/13/22:  Left ventricle is normal in size  Global left ventricular systolic function is moderately reduced  Estimated ejection fraction is 40 % . Left atrium is moderate to severely dilated. Right atrium is moderately dilated . Normal mitral valve structure and function. Mild to moderate mitral regurgitation. Multiple MR jets noted. Mild to moderate tricuspid regurgitation. Severe pulmonary hypertension. No pericardial effusion seen. Normal aortic root dimension.       Significant therapeutic interventions: see above    Significant Diagnostic Studies:   Labs / Micro:  CBC:   Lab Results   Component Value Date    WBC 5.9 05/31/2022    RBC 4.04 05/31/2022    HGB 10.7 05/31/2022    HCT 36.3 05/31/2022    MCV 89.9 05/31/2022 MCH 26.5 05/31/2022    MCHC 29.5 05/31/2022    RDW 17.6 05/31/2022     05/31/2022     BMP:    Lab Results   Component Value Date    GLUCOSE 112 06/03/2022     06/03/2022    K 4.3 06/03/2022     06/03/2022    CO2 25 06/03/2022    ANIONGAP 12 06/03/2022    BUN 45 06/03/2022    CREATININE 1.80 06/03/2022    CREATININE 1.83 06/03/2022    BUNCRER 25 06/03/2022    CALCIUM 9.1 06/03/2022    LABGLOM 28 06/03/2022    LABGLOM 27 06/03/2022    GFRAA 33 06/03/2022    GFRAA 33 06/03/2022    GFR      06/03/2022    GFR      06/03/2022        Radiology:  XR CHEST 1 VIEW    Result Date: 5/30/2022  Stable cardiomegaly. Mild pulmonary vascular congestion. Consultations:    Consults:     Final Specialist Recommendations/Findings:   IP CONSULT TO CARDIOLOGY  IP CONSULT TO HOSPITALIST  IP CONSULT TO NEPHROLOGY  IP CONSULT TO NEPHROLOGY      The patient was seen and examined on day of discharge and this discharge summary is in conjunction with any daily progress note from day of discharge. Discharge plan:     Disposition: Home    Physician Follow Up:     Carmen Cooper  [address] [place] [address]  [phone]    Go on 6/7/2022  Please attend your scheduled appointment. Confirm it is at 8:30 am.     Alice Kinsey MD  [address] [place] [address]  [phone]    Go on 6/8/2022  at 1:30 pm     Carly Ngo [facility]  [phone]    On 6/30/2022  1:30pm with NP       Requiring Further Evaluation/Follow Up POST HOSPITALIZATION/Incidental Findings: follow up PCP, follow up nephrology, follow up cardiology, BMp on Monday, lasix held till evaluated by nephrology as outpatient. Diet: cardiac diet    Activity: As tolerated    Instructions to Patient: He was started on a new medication called amiodarone. Please continue to take it daily. Your Lasix was held.   Please follow-up with a BMP on Monday to assess your kidney function and follow-up with your nephrologist when to restart your Lasix. Discharge Medications:      Medication List      START taking these medications    amiodarone 200 MG tablet  Commonly known as: CORDARONE  Take 1 tablet by mouth daily  Start taking on: 2022        Baldomero Minaya taking these medications    aspirin 81 MG tablet     benzonatate 100 MG capsule  Commonly known as: TESSALON     Handicap Placard Misc  by Does not apply route  5 years from origninal written date 2022    Dx: Osteoarthritis unable to ambulate over 150ft     metoprolol succinate 25 MG extended release tablet  Commonly known as: TOPROL XL     therapeutic multivitamin-minerals tablet     VITAMIN B12 PO     vitamin D 25 MCG (1000 UT) Tabs tablet  Commonly known as: CHOLECALCIFEROL     Xarelto 20 MG Tabs tablet  Generic drug: rivaroxaban        STOP taking these medications    cefUROXime 500 MG tablet  Commonly known as: CEFTIN     furosemide 20 MG tablet  Commonly known as: LASIX           Where to Get Your Medications      These medications were sent to [facility] , [address] [place] [id number]    Phone: [phone]   · amiodarone 200 MG tablet         Discharge Procedure Orders   Basic Metabolic Panel   Standing Status: Future Standing Exp. Date: 23     [facility]) Medication Mgmt (Anticoagulation) - Osei Lozano   Referral Priority: Routine Referral Type: Eval and Treat   Referral Reason: Specialty Services Required   Requested Specialty: Pharmacist   Number of Visits Requested: 1 Expiration Date: 24       Time Spent on discharge is  34 mins in patient examination, evaluation, counseling as well as medication reconciliation, prescriptions for required medications, discharge plan and follow up.     Electronically signed by   Kylah Moe MD  6/3/2022  3:31 PM      Thank you Dr. Kurt Harris for the opportunity to be involved in this patient's care.

## 2022-06-03 NOTE — CONSULTS
Consult Note    Reason for Consult: Elevated BUN/creatinine and acute kidney injury    Requesting Physician:  Zain Lopez MD    HISTORY OF PRESENT ILLNESS:    The patient is a 67 y.o. female who presents with sudden onset and dyspnea, patient was noted to be in atrial fibrillation with rapid ventricular response. Patient underwent synchronized electrical cardioversion. She has since gone into sinus rhythm. Her dyspnea has improved. Patient did receive IV diuretics. Currently she denies any dizziness lightheadedness dysuria hematuria swelling edema PND orthopnea creatinine has progressively increased patient recently was seen at [facility] nephrology and an ultrasound of the kidney which is not yet available for review she has a follow-up appointment at [facility] clinic nephrology next week  Review Of Systems:  Constitutional: No fever, chills, lethargy, weakness or wt loss. HEENT:  No headache, nasal discharge or sore throat. Cardiac:  No chest pain, dyspnea, orthopnea or PND. Chest:              No cough, phlegm or wheezing. Abdomen:  No abdominal pain, nausea, vomiting or diarrhea. Neuro:  No gross focal weakness, numbness, abnormal movements or seizure like activity. Skin:   No rashes or itching. :   No hematuria, pyuria, dysuria or flank pain. Extremities:  No swelling or joint pains. Endocrine: No polyuria, polydypsia, or thyroid problems. Hematology:    No bleeding disorders, bruising or anemia. All other ROS is negative. Unable to obtain because he is intubated and sedated.     Past Medical History:   Diagnosis Date    Anxiety     Arthritis     RA    Asthmatic bronchitis, unspecified asthma severity, uncomplicated 72/94/3793    Atrial fibrillation (HCC)     not recurrent    Dyspnea on exertion 05/09/2018    GERD (gastroesophageal reflux disease)     Hyperlipidemia     diet controlled    Hypertension     Mitral valve disorder     Moderate to severe [facility] ([facility]    Osteoarthritis     Palpitation     Physical deconditioning     Shortness of breath     with excertion work up is negative    Sleep apnea     not on cpap    Spinal stenosis at L4-L5 level     Stage 3b chronic kidney disease (HCC) 2020    Tachycardia     Tricuspid regurgitation     mild       Past Surgical History:   Procedure Laterality Date    CARDIAC CATHETERIZATION  2014    CARDIAC CATHETERIZATION  2016    CARDIAC SURGERY  2021    ablation for a-fib    COLONOSCOPY      ENDOSCOPY, COLON, DIAGNOSTIC      JOINT REPLACEMENT Right     total knee    JOINT REPLACEMENT Left     total knee    KNEE JOINT MANIPULATION Right     SPINE SURGERY  2017    lumbar fusion       Prior to Admission medications    Medication Sig Start Date End Date Taking?  Authorizing Provider   dronedarone hcl (MULTAQ) 400 mg TABS Take 1 tablet by mouth 2 times daily (with meals) 22  Yes Adina Hamm MD   benzonatate (TESSALON) 100 MG capsule Take 100 mg by mouth 3 times daily as needed for Cough   Yes Historical Provider, MD   metoprolol succinate (TOPROL XL) 25 MG extended release tablet Take 25 mg by mouth daily   Yes Historical Provider, MD   vitamin D (CHOLECALCIFEROL) 25 MCG (1000 UT) TABS tablet Take 1,000 Units by mouth daily   Yes Historical Provider, MD   Cyanocobalamin (VITAMIN B12 PO) Take 1 tablet by mouth daily   Yes Historical Provider, MD   cefUROXime (CEFTIN) 500 MG tablet Take 500 mg by mouth 2 times daily   Yes Historical Provider, MD   furosemide (LASIX) 20 MG tablet Take 1 tablet by mouth daily 22   Jayden Counter Orlop, DO   XARELTO 20 MG TABS tablet Take 20 mg by mouth Daily with supper  21   Historical Provider, MD   Multiple Vitamins-Minerals (THERAPEUTIC MULTIVITAMIN-MINERALS) tablet Take 1 tablet by mouth daily    Historical Provider, MD   Handicap Placard MISC by Does not apply route  5 years from origninal written date 2022    Dx: Osteoarthritis unable to ambulate over 150ft 8/7/17   Lucia Carey MD   aspirin 81 MG tablet Take 81 mg by mouth daily. Historical Provider, MD       Scheduled Meds:   rivaroxaban  15 mg Oral Dinner    amiodarone  200 mg Oral Daily    sodium chloride flush  5-40 mL IntraVENous 2 times per day    metoprolol tartrate  25 mg Oral BID    aspirin  81 mg Oral Daily    furosemide  20 mg Oral Daily    therapeutic multivitamin-minerals  1 tablet Oral Daily    sodium chloride flush  5-40 mL IntraVENous 2 times per day     Continuous Infusions:   sodium chloride      sodium chloride       PRN Meds:guaiFENesin, sodium chloride flush, sodium chloride, benzonatate, sodium chloride flush, sodium chloride, ondansetron **OR** [DISCONTINUED] ondansetron, polyethylene glycol, acetaminophen **OR** acetaminophen, potassium chloride **OR** potassium alternative oral replacement **OR** potassium chloride, magnesium sulfate    No Known Allergies    Social History     Socioeconomic History    Marital status: Single     Spouse name: Not on file    Number of children: Not on file    Years of education: Not on file    Highest education level: Not on file   Occupational History    Occupation: dog    Tobacco Use    Smoking status: Never Smoker    Smokeless tobacco: Never Used   Substance and Sexual Activity    Alcohol use: Yes     Alcohol/week: 5.0 standard drinks     Types: 5 Cans of beer per week     Comment: weekly    Drug use: No    Sexual activity: Never   Other Topics Concern    Not on file   Social History Narrative    Not on file     Social Determinants of Health     Financial Resource Strain: Low Risk     Difficulty of Paying Living Expenses: Not hard at all   Food Insecurity: No Food Insecurity    Worried About Running Out of Food in the Last Year: Never true    Mai of Food in the Last Year: Never true   Transportation Needs: No Transportation Needs    Lack of Transportation (Medical):  No    Lack of Transportation (Non-Medical): No   Physical Activity:     Days of Exercise per Week: Not on file    Minutes of Exercise per Session: Not on file   Stress:     Feeling of Stress : Not on file   Social Connections:     Frequency of Communication with Friends and Family: Not on file    Frequency of Social Gatherings with Friends and Family: Not on file    Attends Evangelical Services: Not on file    Active Member of [address] [place] [address] [employer] or Organizations: Not on file    Attends Club or Organization Meetings: Not on file    Marital Status: Not on file   Intimate Partner Violence:     Fear of Current or Ex-Partner: Not on file    Emotionally Abused: Not on file    Physically Abused: Not on file    Sexually Abused: Not on file   Housing Stability:     Unable to Pay for Housing in the Last Year: Not on file    Number of [place] in the Last Year: Not on file    Unstable Housing in the Last Year: Not on file       Family History   Problem Relation Age of Onset    High Blood Pressure Mother     Arthritis Mother     Diabetes Father     Arthritis Sister          Physical Exam:  Vitals:    06/03/22 0500 06/03/22 0600 06/03/22 0800 06/03/22 1100   BP:    105/60   Pulse: 61 80  55   Resp:    20   Temp:   98.1 °F (36.7 °C) 97.7 °F (36.5 °C)   TempSrc:   Temporal Temporal   SpO2:       Weight:       Height:         I/O last 3 completed shifts: In: 600 [P.O.:600]  Out: 1000 [Urine:1000]  CONSTITUTIONAL:  awake, alert, cooperative, no apparent distress, and appears stated age  General:  Awake, alert, not in distress. Appears to be stated age. HEENT: Atraumatic, normocephalic. Anicteric sclera. Pink and moist oral mucosa. No carotid bruit. No JVD. Chest: Bilateral air entry, clear to auscultation, no wheezing, rhonchi or rales. Cardiovascular: RRR, S1S2, no murmur, rub or gallop. No lower extremity edema. Abdomen: Soft, non tender to palpation. Active bowel sounds x 4 quadrants.   Musculoskeletal: Active ROM x 4 extremities. No cyanosis or clubbing. Integumentary: Pink, warm and dry. Free from rash or lesions. Skin turgor normal.  CNS: Oriented to person, place and time. Speech clear. Face symmetrical. No tremor.      Data:  CBC:   Lab Results   Component Value Date    WBC 5.9 05/31/2022    HGB 10.7 (L) 05/31/2022    HCT 36.3 05/31/2022    MCV 89.9 05/31/2022     05/31/2022     BMP:    Lab Results   Component Value Date     06/03/2022     05/31/2022     05/30/2022    K 4.3 06/03/2022    K 4.1 05/31/2022    K 4.0 05/30/2022     06/03/2022     05/31/2022     05/30/2022    CO2 25 06/03/2022    CO2 23 05/31/2022    CO2 26 05/30/2022    BUN 45 (H) 06/03/2022    BUN 28 (H) 05/31/2022    BUN 30 (H) 05/30/2022    CREATININE 1.80 (H) 06/03/2022    CREATININE 1.83 (H) 06/03/2022    CREATININE 1.40 (H) 05/31/2022    GLUCOSE 112 (H) 06/03/2022    GLUCOSE 132 (H) 05/31/2022    GLUCOSE 123 (H) 05/30/2022     CMP:   Lab Results   Component Value Date     06/03/2022    K 4.3 06/03/2022     06/03/2022    CO2 25 06/03/2022    BUN 45 06/03/2022    CREATININE 1.80 06/03/2022    CREATININE 1.83 06/03/2022    GLUCOSE 112 06/03/2022    CALCIUM 9.1 06/03/2022    PROT 6.9 05/31/2022    LABALBU 4.0 05/31/2022    BILITOT 0.56 05/31/2022    ALKPHOS 121 05/31/2022    AST 24 05/31/2022    ALT 25 05/31/2022      Hepatic:   Lab Results   Component Value Date    AST 24 05/31/2022    AST 18 05/30/2022    ALT 25 05/31/2022    ALT 19 05/30/2022    BILITOT 0.56 05/31/2022    BILITOT 0.58 05/30/2022    ALKPHOS 121 (H) 05/31/2022    ALKPHOS 101 05/30/2022     BNP: No results found for: BNP  Lipids: No results found for: CHOL, HDL  INR: No results found for: INR  PTH: No results found for: PTH  Phosphorus:  No results found for: PHOS  Ionized Calcium: No results found for: IONCA  Magnesium:   Lab Results   Component Value Date    MG 2.0 05/31/2022     Albumin:   Lab Results   Component Value Date    LABALBU 4.0 05/31/2022     Last 3 CK, CKMB, Troponin: @LABRCNT(CKTOTAL:3,CKMB:3,TROPONINI:3)       URINE:)No results found for: Mercedes Chen    Radiology:  Reviewed. Assessment:  1. Acute kidney injury likely related to cardiorespiratory instability      Plan:  1. We will check an ultrasound the kidney report from [facility]  And will need a short follow-up next week in [facility] nephrology  Also repeat CBC CHEM profile early next week  Avoid hypotension, nephrotoxic drugs, Lovenox, Fleets enema and IV contrast exposure. Follow up labs ordered for AM.       Thank you for the consultation. Please do not hesitate to call with questions.     Electronically signed by Little Enriquez MD  on 6/3/2022 at 1:35 PM

## 2022-06-03 NOTE — FLOWSHEET NOTE
RN contacted [facility] Radiology Records office and left a detailed voicemail, requesting patient's most recent kidney ultrasound results per Dr. Keerthi Coles request. Nawaf Chapin response at this time.

## 2022-06-03 NOTE — PLAN OF CARE
Problem: Chronic Conditions and Co-morbidities  Goal: Patient's chronic conditions and co-morbidity symptoms are monitored and maintained or improved  6/3/2022 0422 by Catrina Delgado RN  Outcome: Progressing  Flowsheets (Taken 6/2/2022 2000 by Ulises Astudillo RN)  Care Plan - Patient's Chronic Conditions and Co-Morbidity Symptoms are Monitored and Maintained or Improved: Monitor and assess patient's chronic conditions and comorbid symptoms for stability, deterioration, or improvement  6/2/2022 1844 by Lilliana Emery RN  Outcome: Progressing     Problem: Discharge Planning  Goal: Discharge to home or other facility with appropriate resources  6/3/2022 0422 by Catrina Delgado RN  Outcome: Progressing  Flowsheets (Taken 6/2/2022 2000 by Ulises Astudillo RN)  Discharge to home or other facility with appropriate resources: Identify barriers to discharge with patient and caregiver  6/2/2022 1844 by Lilliana Emery RN  Outcome: Progressing     Problem: Safety - Adult  Goal: Free from fall injury  6/3/2022 0422 by Catrina Delgado RN  Outcome: Progressing  6/2/2022 1844 by Lilliana Emery RN  Outcome: Progressing     Problem: Pain  Goal: Verbalizes/displays adequate comfort level or baseline comfort level  6/3/2022 0422 by Catrina Delgado RN  Outcome: Progressing  6/2/2022 1844 by Lilliana Emery RN  Outcome: Progressing

## 2022-06-06 ENCOUNTER — CARE COORDINATION (OUTPATIENT)
Dept: CASE MANAGEMENT | Age: 72
End: 2022-06-06

## 2022-06-06 LAB
EKG ATRIAL RATE: 70 BPM
EKG P AXIS: 26 DEGREES
EKG P-R INTERVAL: 152 MS
EKG Q-T INTERVAL: 456 MS
EKG QRS DURATION: 86 MS
EKG QTC CALCULATION (BAZETT): 492 MS
EKG R AXIS: 9 DEGREES
EKG T AXIS: 98 DEGREES
EKG VENTRICULAR RATE: 70 BPM
P E INTERPRETATION, U: NORMAL
PATHOLOGIST: NORMAL
SPECIMEN TYPE: NORMAL
URINE TOTAL PROTEIN: 8 MG/DL

## 2022-06-06 PROCEDURE — 93010 ELECTROCARDIOGRAM REPORT: CPT | Performed by: INTERNAL MEDICINE

## 2022-06-06 NOTE — CARE COORDINATION
[facility] Transitions Initial Follow Up Call    Call within 2 business days of discharge: Yes    Patient: Belkis Tejada Patient : 1950   MRN: [id number]  Reason for Admission: Atrial Fibrillation with rapid ventricular response  Discharge Date: 6/3/22 RARS: Readmission Risk Score: 13.5 ( )      Last Discharge [facility]       Complaint Diagnosis Description Type Department Provider    22 Shortness of Breath; Atrial Fibrillation Atrial fibrillation with rapid ventricular response ([facility] . .. ED to Hosp-Admission (Discharged) (ADMITTED) ANDERSON Howell MD; Colette . .. First attempt at initial 24 hour CTN call     Spoke with:  Called to speak with patient for initial  transition of care. Left HIPPA compliant voice message with contact information [phone] for a call  Back with an update. Facility: [facility]    Non-face-to-face services provided:  Obtained and reviewed discharge summary and/or continuity of care documents    Care Transitions 24 Hour Call    Do you have all of your prescriptions and are they filled?: Yes  Care Transitions Interventions         Follow Up  No future appointments.     Jc Self LPN

## 2022-06-07 ENCOUNTER — TELEPHONE (OUTPATIENT)
Dept: PHARMACY | Age: 72
End: 2022-06-07

## 2022-06-07 ENCOUNTER — CARE COORDINATION (OUTPATIENT)
Dept: CASE MANAGEMENT | Age: 72
End: 2022-06-07

## 2022-06-07 NOTE — CARE COORDINATION
[facility] Transitions Initial Follow Up Call    Call within 2 business days of discharge: Yes    Patient: Madison Trivedi Patient : 1950   MRN: [id number]  Reason for Admission: Atrial Fibrillation with rapid ventricular response  Discharge Date: 6/3/22 RARS: Readmission Risk Score: 13.5 ( )      Last Discharge [facility]       Complaint Diagnosis Description Type Department Provider    22 Shortness of Breath; Atrial Fibrillation Atrial fibrillation with rapid ventricular response ([facility] . .. ED to Hosp-Admission (Discharged) (ADMITTED) ANDERSON Platt MD; Colette . .. Second attempt at initial 24 hour CTN call     Spoke with: Called to speak with patient for initial  transition of care. Left HIPPA compliant voice message with contact information [phone] for a call  Back with an update. Facility: [facility]    Non-face-to-face services provided:  Obtained and reviewed discharge summary and/or continuity of care documents    Care Transitions 24 Hour Call    Do you have all of your prescriptions and are they filled?: Yes  Care Transitions Interventions         Follow Up  No future appointments.     Jaren Zhou LPN

## 2022-06-07 NOTE — TELEPHONE ENCOUNTER
Received new referral for Anticoagulation Service - Xarelto management from Dr. Wilmar Whittaker. Patient had worsening renal function during hospital stay and Xarelto dose was reduced to 15mg. Called patient and left voicemail to schedule initial appointment. It is unclear if patient has 15mg tablets at home, or if she resumed her 20mg tablets. I asked for her to confirm that she got her BMP lab drawn yesterday as requested at discharge. May need to send new RX for 15mg or obtain patient samples. Bradley Edwards

## 2023-01-24 ENCOUNTER — HOSPITAL ENCOUNTER (OUTPATIENT)
Dept: PREADMISSION TESTING | Age: 73
Discharge: HOME | End: 2023-01-28
Payer: MEDICARE

## 2023-01-24 VITALS
HEIGHT: 68 IN | TEMPERATURE: 98.2 F | RESPIRATION RATE: 16 BRPM | DIASTOLIC BLOOD PRESSURE: 68 MMHG | BODY MASS INDEX: 21.44 KG/M2 | OXYGEN SATURATION: 99 % | SYSTOLIC BLOOD PRESSURE: 124 MMHG | HEART RATE: 50 BPM

## 2023-01-24 PROCEDURE — 93005 ELECTROCARDIOGRAM TRACING: CPT | Performed by: STUDENT IN AN ORGANIZED HEALTH CARE EDUCATION/TRAINING PROGRAM

## 2023-01-24 PROCEDURE — 87641 MR-STAPH DNA AMP PROBE: CPT

## 2023-01-24 RX ORDER — AMOXICILLIN 500 MG/1
CAPSULE ORAL
COMMUNITY
Start: 2022-12-01

## 2023-01-24 RX ORDER — VALSARTAN 40 MG/1
40 TABLET ORAL DAILY
COMMUNITY
Start: 2022-12-21

## 2023-01-24 RX ORDER — SPIRONOLACTONE 25 MG/1
25 TABLET ORAL DAILY
COMMUNITY
Start: 2022-12-21

## 2023-01-24 NOTE — H&P
History and Physical Service   [facility] 12    HISTORY AND PHYSICAL EXAMINATION            Date of Evaluation: 1/24/2023  Patient name:  Li Stein  MRN:   [id number]  YOB: 1950  PCP:    Rinku Raymundo    History Obtained From:     Patient, medical records    History of Present Illness: This is Li Means a 75 y.o. female who presents for a pre-admission testing appointment for an upcoming [address] [place] [address] Floor by Mari Lopez MD scheduled on 02/16/2023 at 0730 due to Primary osteoarthritis of left shoulder [M19.012]. The patient's chief complaint is aching left shoulder pain which has progressively worsened over the past seven months. Shoulder pain is aggravated by any backwards movement of the arm, lifting overhead, or holding any weight in the arm and is minimally relieved with rest, avoiding aggravating movements. Prior treatment includes physical therapy with no improvement. Denies recent falls and injuries. Has significant history of atrial fibrillation s/p ablation 11/01/2021 and cardioversion 06/14/2022, hyperlipidemia, hypertension, congestive heart failure, mitral valve disorder, pulmonary hypertension, palpitations, shortness of breath, sleep apnea, tachycardia, and tricuspid regurgitation. She follows with cardiology Dr. Polly Hemphill, last seen in office 12/21/2022. Has upcoming repeat ECHO scheduled for 01/30/2022 to assess EF as last was 40% in April. Denies any current chest pain, palpitations, shortness of breath, or lightheadedness. ECHO 04/13/2022:    Summary  Left ventricle is normal in size  Global left ventricular systolic function is moderately reduced  Estimated ejection fraction is 40 % . Left atrium is moderate to severely dilated. Right atrium is moderately dilated . Normal mitral valve structure and function. Mild to moderate mitral regurgitation. Multiple MR jets noted.   Mild to moderate tricuspid regurgitation.      Functional Capacity:  1) Pt is able to walk 2 city blocks on level ground without SOB.  2) Pt is able to climb 2 flights of stairs without SOB.  3) Pt is not able to walk up a hill for 1-2 city blocks without SOB.    Past Medical History:     Past Medical History:   Diagnosis Date    Arthritis     RA    Asthma     Denies.  Does not have inhalers    Atrial fibrillation (HCC)     not recurrent    Hyperlipidemia     diet controlled    Hypertension     Mitral valve disorder     Moderate to severe pulmonary hypertension (HCC) 05/09/2018    Osteoarthritis     Sleep apnea     not on cpap    Spinal stenosis at L4-L5 level     Stage 3b chronic kidney disease (HCC) 2020    Tachycardia     Tricuspid regurgitation     mild        Past Surgical History:     Past Surgical History:   Procedure Laterality Date    CARDIAC CATHETERIZATION  05/19/2014    CARDIAC CATHETERIZATION  01/20/2016    no stents    CARDIAC SURGERY  11/01/2021    ablation for a-fib    COLONOSCOPY      ENDOSCOPY, COLON, DIAGNOSTIC      JOINT REPLACEMENT Right     total knee    JOINT REPLACEMENT Left     total knee    KNEE JOINT MANIPULATION Right     SPINE SURGERY  07/2017    lumbar fusion        Medications Prior to Admission:     Prior to Admission medications    Medication Sig Start Date End Date Taking? Authorizing Provider   spironolactone (ALDACTONE) 25 MG tablet Take 25 mg by mouth daily 12/21/22   Historical Provider, MD   valsartan (DIOVAN) 40 MG tablet Take 40 mg by mouth daily 12/21/22   Historical Provider, MD   amoxicillin (AMOXIL) 500 MG capsule TAKE 4 CAPSULES BY MOUTH 1 HOUR BEFORE DENTAL PROCEDURE 12/1/22   Historical Provider, MD   amiodarone (CORDARONE) 200 MG tablet Take 1 tablet by mouth daily 6/4/22 7/4/22  Brenda Shepherd MD   metoprolol succinate (TOPROL XL) 25 MG extended release tablet Take 25 mg by mouth daily    Historical Provider, MD   vitamin D (CHOLECALCIFEROL) 25 MCG (1000 UT) TABS tablet Take  1,000 Units by mouth daily    Historical Provider, MD   Cyanocobalamin (VITAMIN B12 PO) Take 1 tablet by mouth daily    Historical Provider, MD   XARELTO 20 MG TABS tablet Take 20 mg by mouth Daily with supper  21   Historical Provider, MD   Multiple Vitamins-Minerals (THERAPEUTIC MULTIVITAMIN-MINERALS) tablet Take 1 tablet by mouth daily    Historical Provider, MD   Handicap Placard MISC by Does not apply route  5 years from origninal written date 2022    Dx: Osteoarthritis unable to ambulate over 150ft 17   Luis Rivera MD        Allergies:     Patient has no known allergies. Social History:     Tobacco:    reports that she has never smoked. She has never used smokeless tobacco.  Alcohol:      reports current alcohol use of about 5.0 standard drinks per week. Drug Use:  reports no history of drug use. Family History:     Family History   Problem Relation Age of Onset    High Blood Pressure Mother     Arthritis Mother     Diabetes Father     Arthritis Sister        Review of Systems:     Positive and Negative as described in HPI. CONSTITUTIONAL: Unplanned weight loss approximately 40 pounds in 2 years- managed by PCP. Negative for fevers, chills, sweats, fatigue  HEENT: Contacts. Negative for hearing changes, rhinorrhea, and throat pain. RESPIRATORY: Sleep apnea- was diagnosed \"years ago\" and had repeat study where she was told \"mild\"- no CPAP. Negative for shortness of breath, cough, congestion, and wheezing. CARDIOVASCULAR: See HPI. Negative for chest pain, blood clot  GASTROINTESTINAL: GERD Negative for nausea, vomiting, diarrhea, constipation, change in bowel habits, and abdominal pain. GENITOURINARY: Stage 3b chronic kidney disease- managed by nephrology. Negative for difficulty of urination, burning with urination, and frequency. INTEGUMENT: Bruise easily. Negative for rash, skin lesions.  Instructed pt to call Dr. Farzad Chaney as soon as possible if a rash or wound develops prior to surgery. Pt voiced understanding. HEMATOLOGIC/LYMPHATIC:  Negative for swelling/edema. ALLERGIC/IMMUNOLOGIC: Negative for urticaria and itching. ENDOCRINE: Negative for increase in thirst, increase in urination, and heat or cold intolerance. MUSCULOSKELETAL: See HPI. NEUROLOGICAL: Negative for headaches, dizziness, lightheadedness, numbness, and tingling extremities. BEHAVIOR/PSYCH: Negative for depression and anxiety. Physical Exam:   /68   Pulse 50   Temp 98.2 °F (36.8 °C) (Oral)   Resp 16   Ht 5' 8\" (1.727 m)   SpO2 99%   BMI 21.44 kg/m²   No LMP recorded. Patient is postmenopausal.  No obstetric history on file. No results for input(s): POCGLU in the last 72 hours. General Appearance:  Alert, well appearing, and in no acute distress. Mental status:  Oriented to person, place, and time. Head:  Normocephalic and atraumatic. Eye: Contacts No icterus, redness, pupils equal and reactive, extraocular eye movements intact, and conjunctiva clear. Ear:  Hearing grossly intact. Nose:  No drainage noted. Mouth:  Mucous membranes moist.  Neck:  Supple and no carotid bruits noted. Lungs:  Bilateral equal air entry, clear to auscultation, no wheezing, rales or rhonchi, and normal effort. Cardiovascular: Asymptomatic bradycardia HR 50 Normal rhythm, no murmur, gallop, or rub. Abdomen:  Soft, non-tender, non-distended, and active bowel sounds. Neurologic:  Normal speech and cranial nerves II through XII grossly intact. Strength 5/5 bilaterally. Skin: No gross lesions, rashes, bruising, or bleeding on exposed skin area. Extremities: Posterior tibial pulses 2+ bilaterally. No pedal edema. No calf tenderness with palpation. Psych: Normal affect.      Investigations:      Laboratory Testing:  Recent Results (from the past 24 hour(s))   EKG 12 Lead    Collection Time: 01/24/23  2:07 PM   Result Value Ref Range    Ventricular Rate 48 BPM    Atrial Rate 48 BPM    P-R Interval 172 ms QRS Duration 100 ms    Q-T Interval 480 ms    QTc Calculation (Bazett) 428 ms    P Axis 83 degrees    R Axis 36 degrees    T Axis 78 degrees       No results for input(s): HGB, HCT, WBC, MCV, PLATELET, NA, K, CL, CO2, BUN, CREATININE, GLUCOSE, INR, PROTIME, APTT, AST, ALT, LABALBU, HCG in the last 720 hours. No results for input(s): COVID19 in the last 720 hours. *Please note that labs listed above are the most recent lab values available in EPIC at the time of the visit and additional labs may have been drawn or resulted since that time. Imaging/Diagnostics:      No results found. EK2023. See Epic. Diagnosis:      1. Primary osteoarthritis of left shoulder [M19.012]. Plans:     1.  LEFT SHOULDER TOTAL ARTHROPLASTY REVERSE - NITESH Ribeiro - CNP  2023  2:37 PM

## 2023-01-24 NOTE — PRE-PROCEDURE INSTRUCTIONS
[address] ON Thursday, Feb 16,2023 at 05:45 PM    Once you enter the hospital lobby, take the elevators to the second floor. Check-In is at the surgery registration desk. Continue to take your home medications as you normally do up to and including the night before surgery with the exception of any blood thinning medications. Please stop any blood thinning medications as directed by your surgeon or prescribing physician. Failure to stop certain medications may interfere with your scheduled surgery. These may include:  Aspirin, Warfarin (Coumadin), Clopidogrel (Plavix), Ibuprofen (Motrin, Advil), Naproxen (Aleve), Meloxicam (Mobic), Celecoxib (Celebrex), Eliquis, Pradaxa, Xarelto, Effient, Fish Oil, Herbal supplements. Check with Cardiologist when to stop   Chuy Dellen before surgery      Please take the following medication(s) the day of surgery with a small sip of water:  Amiodareone,metoprolol,      PREPARING FOR YOUR SURGERY:     Before surgery, you can play an important role in your own health. Because skin is not sterile, we need to be sure that your skin is as free of germs as possible before surgery by carefully washing before surgery. Preparing or prepping skin before surgery can reduce the risk of a surgical site infection.   Do not shave the area of your body where your surgery will be performed unless you received specific permission from your physician. You will need to shower at home the night before surgery and the morning of surgery with a special soap called chlorhexidine gluconate (CHG*). *Not to be used by people allergic to Chlorhexidine Gluconate (CHG). Following these instructions will help you be sure that your skin is clean before surgery. Instructions on cleaning your skin before surgery: The night before your surgery: You will need to shower with warm water (not hot) and the CHG soap. Use a clean wash cloth and a clean towel.   Have clean clothes available to put on after the shower. First wash your hair with regular shampoo. Rinse your hair and body thoroughly to remove the shampoo. Wash your face and genital area (private parts) with your regular soap or water only. Thoroughly rinse your body with warm water from the neck down. Turn water off to prevent rinsing the soap off too soon. With a clean wet washcloth and half of the CHG soap in the bottle, lather your entire body from the neck down. Do not use CHG soap near your eyes or ears to avoid injury to those areas. Wash thoroughly, paying special attention to the area where your surgery will be performed. Wash your body gently for five (5) minutes. Avoid scrubbing your skin too hard. Turn the water back on and rinse your body thoroughly. Pat yourself dry with a clean, soft towel. Do not apply lotion, cream or powder. Dress with clean freshly washed clothes. The morning of surgery:    Repeat shower following steps above - using remaining half of CHG soap in bottle. Patient Instructions: If you are having any type of anesthesia you are to have nothing to eat or drink after midnight the night before your surgery. This includes gum, hard candy, mints, water or smoking or chewing tobacco.  The only exception to this is a small sip of water to take with any morning dose of heart, blood pressure, or seizure medications. No alcoholic beverages for 24 hours prior to surgery. Brush your teeth but do not swallow water. Bring your eyeglasses and case with you. No contacts are to be worn the day of surgery. You also may bring your hearing aids. Most surgical procedures involving anesthesia will require that you remove your dentures prior to surgery. Do not wear any jewelry or body piercings day of surgery. Also, NO lotion, perfume or deodorant to be used the day of surgery.   No nail polish on the operative extremity (arm/leg surgeries)    If you are staying overnight with us, please bring a small bag of necessary personal items. Please wear loose, comfortable clothing. If you are potentially going to have a cast or brace bring clothing that will fit over them. In case of illness - If you have cold or flu like symptoms (high fever, runny nose, sore throat, cough, etc.) rash, nausea, vomiting, loose stools, and/or recent contact with someone who has a contagious disease (chicken pox, measles, etc.) Please call your doctor before coming to the hospital.         Day of Surgery/Procedure:    As a patient at [place] [address] you can expect quality medical and nursing care that is centered on your individual needs. Our goal is to make your surgical experience as comfortable as possible    . Transportation After Your Surgery/Procedure: You will need a friend or family member to drive you home after your procedure. Your  must be 25years of age or older and able to sign off on your discharge instructions. A taxi cab or any other form of public transportation is not acceptable. Your friend or family member must stay at the hospital throughout your procedure. Someone must remain with you for the first 24 hours after your surgery if you receive anesthesia or medication. If you do not have someone to stay with you, your procedure may be cancelled.       If you have any other questions regarding your procedure or the day of surgery, please call [phone]      _________________________  ____________________________  Signature (Patient)              Signature (Provider) & date

## 2023-01-25 LAB
EKG ATRIAL RATE: 48 BPM
EKG P AXIS: 83 DEGREES
EKG P-R INTERVAL: 172 MS
EKG Q-T INTERVAL: 480 MS
EKG QRS DURATION: 100 MS
EKG QTC CALCULATION (BAZETT): 428 MS
EKG R AXIS: 36 DEGREES
EKG T AXIS: 78 DEGREES
EKG VENTRICULAR RATE: 48 BPM
MRSA, DNA, NASAL: NEGATIVE
SPECIMEN DESCRIPTION: NORMAL

## 2023-01-25 NOTE — FLOWSHEET NOTE
01/25/23 1351   Anesthesia PAT Clearance   Anesthesia Review Status Anes has reviewed patient for surgery  (Dr. Rodrigo Rivas reviewed history and physical and wants the echo from 1/30/2023 on the chart along with last office note from Dr. Rose Rahman)

## 2023-02-15 ENCOUNTER — ANESTHESIA EVENT (OUTPATIENT)
Dept: OPERATING ROOM | Age: 73
End: 2023-02-15
Payer: MEDICARE

## 2023-02-15 NOTE — DISCHARGE INSTRUCTIONS
Keep it Clean - Post-Operative Home instructions    These instructions are to help you have the best possible recovery after your surgical procedure. Shauna Tejada is here to support you. If you have questions, call [phone] Monday through Friday from 7:30AM to 8:30PM to speak to a nurse. If you need to speak to someone outside of these hours, call your physician. Incision Dos and Donts  Do wash hands before and after dressing changes or when you have had any contact with your incision. Use hand  or antibacterial soap. Do keep your incision clean and dry. Its OK to wash the skin around your incision with mild soap and water. Do change your dressing as you were told. Do notify your doctor if the dressing becomes wet or dirty. Do use a clean washcloth every time when cleaning your incision. Do sleep on clean linens. Do keep pets away from incision site. Dont sit in a bathtub, pool, or hot tub until your incision is fully closed and any drains are removed. Dont scrub, pick, scratch, or pull at your incision. Dont use oils, lotions, or creams on your incision unless your healthcare provider approves it. Follow-up  You will have one or more follow-up visits with your healthcare provider. These are needed to check how well youre healing. Your drain, stitches, or staples may also be removed during these visits. Do not miss your follow-up visit, even if you are feeling better. Call your healthcare provider right away if you have the following:  Fever of 100.4°F (38°C) or higher, or as advised by your healthcare provider. Chest pain or trouble breathing. Pain or tenderness in your leg(s). Increased pain, redness, swelling, bleeding, or foul-smelling drainage at the incision site. Incision changes, separates or is hot to the touch. Problems with the drain if you have one. Itchy, swollen skin; skin rash.     Medicines, Diet, and Activity:   Refer to your discharge paperwork for further instructions      Discharge to home  F/U 2- 3 weeks in office  Call for Appt if not already made  Keep aquacel intact  Activity Ok to remove the sling for ADL's  Sling use for comfort  No lifting heavier than 3 lbs for 3 months    John Ospina MD

## 2023-02-16 ENCOUNTER — ANESTHESIA (OUTPATIENT)
Dept: OPERATING ROOM | Age: 73
End: 2023-02-16
Payer: MEDICARE

## 2023-02-16 ENCOUNTER — HOSPITAL ENCOUNTER (OUTPATIENT)
Age: 73
Discharge: HOME | End: 2023-02-16
Attending: ORTHOPAEDIC SURGERY | Admitting: ORTHOPAEDIC SURGERY
Payer: MEDICARE

## 2023-02-16 VITALS
SYSTOLIC BLOOD PRESSURE: 125 MMHG | RESPIRATION RATE: 20 BRPM | DIASTOLIC BLOOD PRESSURE: 61 MMHG | HEIGHT: 68 IN | HEART RATE: 53 BPM | TEMPERATURE: 97.2 F | BODY MASS INDEX: 21.37 KG/M2 | WEIGHT: 141 LBS | OXYGEN SATURATION: 98 %

## 2023-02-16 DIAGNOSIS — G89.18 ACUTE POSTOPERATIVE PAIN: Primary | ICD-10-CM

## 2023-02-16 PROBLEM — M19.012 LOCALIZED OSTEOARTHRITIS OF LEFT SHOULDER: Status: ACTIVE | Noted: 2023-02-16

## 2023-02-16 PROCEDURE — 7100000010 HC PHASE II RECOVERY - FIRST 15 MIN: Performed by: ORTHOPAEDIC SURGERY

## 2023-02-16 PROCEDURE — 97530 THERAPEUTIC ACTIVITIES: CPT

## 2023-02-16 PROCEDURE — 6360000002 HC RX W HCPCS: Performed by: NURSE ANESTHETIST, CERTIFIED REGISTERED

## 2023-02-16 PROCEDURE — A4217 STERILE WATER/SALINE, 500 ML: HCPCS | Performed by: ORTHOPAEDIC SURGERY

## 2023-02-16 PROCEDURE — C1776 JOINT DEVICE (IMPLANTABLE): HCPCS | Performed by: ORTHOPAEDIC SURGERY

## 2023-02-16 PROCEDURE — 97110 THERAPEUTIC EXERCISES: CPT

## 2023-02-16 PROCEDURE — 7100000011 HC PHASE II RECOVERY - ADDTL 15 MIN: Performed by: ORTHOPAEDIC SURGERY

## 2023-02-16 PROCEDURE — 3700000001 HC ADD 15 MINUTES (ANESTHESIA): Performed by: ORTHOPAEDIC SURGERY

## 2023-02-16 PROCEDURE — 97535 SELF CARE MNGMENT TRAINING: CPT

## 2023-02-16 PROCEDURE — 3600000005 HC SURGERY LEVEL 5 BASE: Performed by: ORTHOPAEDIC SURGERY

## 2023-02-16 PROCEDURE — 97166 OT EVAL MOD COMPLEX 45 MIN: CPT

## 2023-02-16 PROCEDURE — 2500000003 HC RX 250 WO HCPCS: Performed by: NURSE ANESTHETIST, CERTIFIED REGISTERED

## 2023-02-16 PROCEDURE — 3700000000 HC ANESTHESIA ATTENDED CARE: Performed by: ORTHOPAEDIC SURGERY

## 2023-02-16 PROCEDURE — 2709999900 HC NON-CHARGEABLE SUPPLY: Performed by: ORTHOPAEDIC SURGERY

## 2023-02-16 PROCEDURE — 6360000002 HC RX W HCPCS: Performed by: ORTHOPAEDIC SURGERY

## 2023-02-16 PROCEDURE — 3600000015 HC SURGERY LEVEL 5 ADDTL 15MIN: Performed by: ORTHOPAEDIC SURGERY

## 2023-02-16 PROCEDURE — 2580000003 HC RX 258: Performed by: ANESTHESIOLOGY

## 2023-02-16 PROCEDURE — 7100000000 HC PACU RECOVERY - FIRST 15 MIN: Performed by: ORTHOPAEDIC SURGERY

## 2023-02-16 PROCEDURE — 7100000001 HC PACU RECOVERY - ADDTL 15 MIN: Performed by: ORTHOPAEDIC SURGERY

## 2023-02-16 PROCEDURE — 2580000003 HC RX 258: Performed by: ORTHOPAEDIC SURGERY

## 2023-02-16 PROCEDURE — 97162 PT EVAL MOD COMPLEX 30 MIN: CPT

## 2023-02-16 DEVICE — BASEPLATE GLEN DIA25MM STD REVERSED AEQUALIS PERFORM: Type: IMPLANTABLE DEVICE | Site: SHOULDER | Status: FUNCTIONAL

## 2023-02-16 DEVICE — SCREW BONE L30MM DIA6.5MM TI ST FULL THRD CTRL FOR GLEN: Type: IMPLANTABLE DEVICE | Site: SHOULDER | Status: FUNCTIONAL

## 2023-02-16 DEVICE — SCREW BONE L22MM DIA5MM TI ST FULL THRD PERIPH FOR GLEN: Type: IMPLANTABLE DEVICE | Site: SHOULDER | Status: FUNCTIONAL

## 2023-02-16 DEVICE — SCREW BONE L34MM DIA5MM TI ST FULL THRD PERIPH FOR GLEN: Type: IMPLANTABLE DEVICE | Site: SHOULDER | Status: FUNCTIONAL

## 2023-02-16 RX ORDER — ONDANSETRON 4 MG/1
4 TABLET, FILM COATED ORAL EVERY 6 HOURS PRN
Qty: 30 TABLET | Refills: 1
Start: 2023-02-16

## 2023-02-16 RX ORDER — TRANEXAMIC ACID 100 MG/ML
INJECTION, SOLUTION INTRAVENOUS
Status: COMPLETED
Start: 2023-02-16 | End: 2023-02-16

## 2023-02-16 RX ORDER — TRANEXAMIC ACID 100 MG/ML
INJECTION, SOLUTION INTRAVENOUS PRN
Status: DISCONTINUED | OUTPATIENT
Start: 2023-02-16 | End: 2023-02-16 | Stop reason: SDUPTHER

## 2023-02-16 RX ORDER — MIDAZOLAM HYDROCHLORIDE 1 MG/ML
INJECTION INTRAMUSCULAR; INTRAVENOUS PRN
Status: DISCONTINUED | OUTPATIENT
Start: 2023-02-16 | End: 2023-02-16 | Stop reason: SDUPTHER

## 2023-02-16 RX ORDER — SENNA PLUS 8.6 MG/1
1 TABLET ORAL DAILY PRN
Status: CANCELLED | OUTPATIENT
Start: 2023-02-16

## 2023-02-16 RX ORDER — KETOROLAC TROMETHAMINE 15 MG/ML
15 INJECTION, SOLUTION INTRAMUSCULAR; INTRAVENOUS EVERY 6 HOURS
Status: CANCELLED | OUTPATIENT
Start: 2023-02-16 | End: 2023-02-19

## 2023-02-16 RX ORDER — OXYCODONE HYDROCHLORIDE 5 MG/1
10 TABLET ORAL EVERY 4 HOURS PRN
Status: CANCELLED | OUTPATIENT
Start: 2023-02-16

## 2023-02-16 RX ORDER — ONDANSETRON 2 MG/ML
4 INJECTION INTRAMUSCULAR; INTRAVENOUS
Status: DISCONTINUED | OUTPATIENT
Start: 2023-02-16 | End: 2023-02-16 | Stop reason: HOSPADM

## 2023-02-16 RX ORDER — UBIDECARENONE 75 MG
50 CAPSULE ORAL DAILY
Status: CANCELLED | OUTPATIENT
Start: 2023-02-16

## 2023-02-16 RX ORDER — PHENYLEPHRINE HCL IN 0.9% NACL 1 MG/10 ML
SYRINGE (ML) INTRAVENOUS PRN
Status: DISCONTINUED | OUTPATIENT
Start: 2023-02-16 | End: 2023-02-16 | Stop reason: SDUPTHER

## 2023-02-16 RX ORDER — ROCURONIUM BROMIDE 10 MG/ML
INJECTION, SOLUTION INTRAVENOUS PRN
Status: DISCONTINUED | OUTPATIENT
Start: 2023-02-16 | End: 2023-02-16 | Stop reason: SDUPTHER

## 2023-02-16 RX ORDER — ONDANSETRON 2 MG/ML
INJECTION INTRAMUSCULAR; INTRAVENOUS PRN
Status: DISCONTINUED | OUTPATIENT
Start: 2023-02-16 | End: 2023-02-16 | Stop reason: SDUPTHER

## 2023-02-16 RX ORDER — LIDOCAINE HYDROCHLORIDE 10 MG/ML
1 INJECTION, SOLUTION EPIDURAL; INFILTRATION; INTRACAUDAL; PERINEURAL
Status: DISCONTINUED | OUTPATIENT
Start: 2023-02-16 | End: 2023-02-16 | Stop reason: HOSPADM

## 2023-02-16 RX ORDER — OXYCODONE HYDROCHLORIDE 5 MG/1
5 TABLET ORAL
Status: DISCONTINUED | OUTPATIENT
Start: 2023-02-16 | End: 2023-02-16 | Stop reason: HOSPADM

## 2023-02-16 RX ORDER — RIVAROXABAN 10 MG/1
10 TABLET, FILM COATED ORAL
Qty: 9 TABLET | Refills: 0 | Status: SHIPPED | OUTPATIENT
Start: 2023-02-16 | End: 2023-02-25

## 2023-02-16 RX ORDER — SODIUM CHLORIDE 9 MG/ML
INJECTION, SOLUTION INTRAVENOUS PRN
Status: DISCONTINUED | OUTPATIENT
Start: 2023-02-16 | End: 2023-02-16 | Stop reason: HOSPADM

## 2023-02-16 RX ORDER — DIPHENHYDRAMINE HYDROCHLORIDE 50 MG/ML
12.5 INJECTION INTRAMUSCULAR; INTRAVENOUS
Status: DISCONTINUED | OUTPATIENT
Start: 2023-02-16 | End: 2023-02-16 | Stop reason: HOSPADM

## 2023-02-16 RX ORDER — VANCOMYCIN HYDROCHLORIDE 1 G/20ML
INJECTION, POWDER, LYOPHILIZED, FOR SOLUTION INTRAVENOUS PRN
Status: DISCONTINUED | OUTPATIENT
Start: 2023-02-16 | End: 2023-02-16 | Stop reason: ALTCHOICE

## 2023-02-16 RX ORDER — DOCUSATE SODIUM 100 MG/1
100 CAPSULE, LIQUID FILLED ORAL 2 TIMES DAILY
Qty: 30 CAPSULE | Refills: 0
Start: 2023-02-16

## 2023-02-16 RX ORDER — DEXAMETHASONE SODIUM PHOSPHATE 10 MG/ML
INJECTION, SOLUTION INTRAMUSCULAR; INTRAVENOUS PRN
Status: DISCONTINUED | OUTPATIENT
Start: 2023-02-16 | End: 2023-02-16 | Stop reason: SDUPTHER

## 2023-02-16 RX ORDER — ONDANSETRON 4 MG/1
4 TABLET, ORALLY DISINTEGRATING ORAL EVERY 8 HOURS PRN
Status: CANCELLED | OUTPATIENT
Start: 2023-02-16

## 2023-02-16 RX ORDER — ONDANSETRON 2 MG/ML
4 INJECTION INTRAMUSCULAR; INTRAVENOUS EVERY 6 HOURS PRN
Status: CANCELLED | OUTPATIENT
Start: 2023-02-16

## 2023-02-16 RX ORDER — SPIRONOLACTONE 25 MG/1
25 TABLET ORAL DAILY
Status: CANCELLED | OUTPATIENT
Start: 2023-02-16

## 2023-02-16 RX ORDER — SODIUM CHLORIDE 0.9 % (FLUSH) 0.9 %
5-40 SYRINGE (ML) INJECTION EVERY 12 HOURS SCHEDULED
Status: CANCELLED | OUTPATIENT
Start: 2023-02-16

## 2023-02-16 RX ORDER — OXYCODONE HYDROCHLORIDE AND ACETAMINOPHEN 5; 325 MG/1; MG/1
1-2 TABLET ORAL EVERY 4 HOURS PRN
Qty: 50 TABLET | Refills: 0
Start: 2023-02-16 | End: 2023-02-23

## 2023-02-16 RX ORDER — HYDROXYZINE HYDROCHLORIDE 10 MG/1
10 TABLET, FILM COATED ORAL EVERY 8 HOURS PRN
Status: CANCELLED | OUTPATIENT
Start: 2023-02-16

## 2023-02-16 RX ORDER — HYDROMORPHONE HYDROCHLORIDE 1 MG/ML
0.25 INJECTION, SOLUTION INTRAMUSCULAR; INTRAVENOUS; SUBCUTANEOUS EVERY 5 MIN PRN
Status: DISCONTINUED | OUTPATIENT
Start: 2023-02-16 | End: 2023-02-16 | Stop reason: HOSPADM

## 2023-02-16 RX ORDER — HYDROMORPHONE HYDROCHLORIDE 1 MG/ML
0.5 INJECTION, SOLUTION INTRAMUSCULAR; INTRAVENOUS; SUBCUTANEOUS EVERY 5 MIN PRN
Status: DISCONTINUED | OUTPATIENT
Start: 2023-02-16 | End: 2023-02-16 | Stop reason: HOSPADM

## 2023-02-16 RX ORDER — ACETAMINOPHEN 325 MG/1
650 TABLET ORAL EVERY 6 HOURS
Status: CANCELLED | OUTPATIENT
Start: 2023-02-16

## 2023-02-16 RX ORDER — SODIUM CHLORIDE 0.9 % (FLUSH) 0.9 %
5-40 SYRINGE (ML) INJECTION EVERY 12 HOURS SCHEDULED
Status: DISCONTINUED | OUTPATIENT
Start: 2023-02-16 | End: 2023-02-16 | Stop reason: HOSPADM

## 2023-02-16 RX ORDER — SODIUM CHLORIDE 9 MG/ML
INJECTION, SOLUTION INTRAVENOUS CONTINUOUS
Status: DISCONTINUED | OUTPATIENT
Start: 2023-02-16 | End: 2023-02-16 | Stop reason: HOSPADM

## 2023-02-16 RX ORDER — HYDROMORPHONE HYDROCHLORIDE 1 MG/ML
0.25 INJECTION, SOLUTION INTRAMUSCULAR; INTRAVENOUS; SUBCUTANEOUS
Status: CANCELLED | OUTPATIENT
Start: 2023-02-16

## 2023-02-16 RX ORDER — SODIUM CHLORIDE 0.9 % (FLUSH) 0.9 %
5-40 SYRINGE (ML) INJECTION PRN
Status: DISCONTINUED | OUTPATIENT
Start: 2023-02-16 | End: 2023-02-16 | Stop reason: HOSPADM

## 2023-02-16 RX ORDER — AMIODARONE HYDROCHLORIDE 200 MG/1
200 TABLET ORAL DAILY
Status: CANCELLED | OUTPATIENT
Start: 2023-02-16

## 2023-02-16 RX ORDER — METOPROLOL SUCCINATE 25 MG/1
25 TABLET, EXTENDED RELEASE ORAL DAILY
Status: CANCELLED | OUTPATIENT
Start: 2023-02-16

## 2023-02-16 RX ORDER — VALSARTAN 40 MG/1
40 TABLET ORAL DAILY
Status: CANCELLED | OUTPATIENT
Start: 2023-02-16

## 2023-02-16 RX ORDER — FENTANYL CITRATE 50 UG/ML
INJECTION, SOLUTION INTRAMUSCULAR; INTRAVENOUS PRN
Status: DISCONTINUED | OUTPATIENT
Start: 2023-02-16 | End: 2023-02-16 | Stop reason: SDUPTHER

## 2023-02-16 RX ORDER — SODIUM CHLORIDE, SODIUM LACTATE, POTASSIUM CHLORIDE, CALCIUM CHLORIDE 600; 310; 30; 20 MG/100ML; MG/100ML; MG/100ML; MG/100ML
INJECTION, SOLUTION INTRAVENOUS CONTINUOUS
Status: DISCONTINUED | OUTPATIENT
Start: 2023-02-16 | End: 2023-02-16 | Stop reason: HOSPADM

## 2023-02-16 RX ORDER — HYDROMORPHONE HYDROCHLORIDE 1 MG/ML
0.5 INJECTION, SOLUTION INTRAMUSCULAR; INTRAVENOUS; SUBCUTANEOUS
Status: CANCELLED | OUTPATIENT
Start: 2023-02-16

## 2023-02-16 RX ORDER — SODIUM CHLORIDE 9 MG/ML
INJECTION, SOLUTION INTRAVENOUS CONTINUOUS
Status: CANCELLED | OUTPATIENT
Start: 2023-02-16

## 2023-02-16 RX ORDER — VANCOMYCIN HYDROCHLORIDE 1 G/20ML
INJECTION, POWDER, LYOPHILIZED, FOR SOLUTION INTRAVENOUS
Status: DISCONTINUED
Start: 2023-02-16 | End: 2023-02-16 | Stop reason: HOSPADM

## 2023-02-16 RX ORDER — LIDOCAINE HYDROCHLORIDE 20 MG/ML
INJECTION, SOLUTION EPIDURAL; INFILTRATION; INTRACAUDAL; PERINEURAL PRN
Status: DISCONTINUED | OUTPATIENT
Start: 2023-02-16 | End: 2023-02-16 | Stop reason: SDUPTHER

## 2023-02-16 RX ORDER — SODIUM CHLORIDE 0.9 % (FLUSH) 0.9 %
5-40 SYRINGE (ML) INJECTION PRN
Status: CANCELLED | OUTPATIENT
Start: 2023-02-16

## 2023-02-16 RX ORDER — OXYCODONE HYDROCHLORIDE 5 MG/1
5 TABLET ORAL EVERY 4 HOURS PRN
Status: CANCELLED | OUTPATIENT
Start: 2023-02-16

## 2023-02-16 RX ORDER — SODIUM CHLORIDE 9 MG/ML
INJECTION, SOLUTION INTRAVENOUS PRN
Status: CANCELLED | OUTPATIENT
Start: 2023-02-16

## 2023-02-16 RX ORDER — PROPOFOL 10 MG/ML
INJECTION, EMULSION INTRAVENOUS PRN
Status: DISCONTINUED | OUTPATIENT
Start: 2023-02-16 | End: 2023-02-16 | Stop reason: SDUPTHER

## 2023-02-16 RX ADMIN — Medication 50 MCG: at 08:33

## 2023-02-16 RX ADMIN — DEXAMETHASONE SODIUM PHOSPHATE 10 MG: 10 INJECTION, SOLUTION INTRAMUSCULAR; INTRAVENOUS at 07:53

## 2023-02-16 RX ADMIN — Medication 100 MCG: at 08:50

## 2023-02-16 RX ADMIN — Medication 2000 MG: at 07:47

## 2023-02-16 RX ADMIN — PROPOFOL 150 MG: 10 INJECTION, EMULSION INTRAVENOUS at 07:37

## 2023-02-16 RX ADMIN — SUGAMMADEX 200 MG: 100 INJECTION, SOLUTION INTRAVENOUS at 09:12

## 2023-02-16 RX ADMIN — TRANEXAMIC ACID 1000 MG: 100 INJECTION, SOLUTION INTRAVENOUS at 07:48

## 2023-02-16 RX ADMIN — TRANEXAMIC ACID 1000 MG: 100 INJECTION, SOLUTION INTRAVENOUS at 08:56

## 2023-02-16 RX ADMIN — Medication 100 MCG: at 08:59

## 2023-02-16 RX ADMIN — SODIUM CHLORIDE, POTASSIUM CHLORIDE, SODIUM LACTATE AND CALCIUM CHLORIDE: 600; 310; 30; 20 INJECTION, SOLUTION INTRAVENOUS at 06:41

## 2023-02-16 RX ADMIN — Medication 50 MCG: at 07:51

## 2023-02-16 RX ADMIN — FENTANYL CITRATE 50 MCG: 50 INJECTION, SOLUTION INTRAMUSCULAR; INTRAVENOUS at 07:37

## 2023-02-16 RX ADMIN — FENTANYL CITRATE 50 MCG: 50 INJECTION, SOLUTION INTRAMUSCULAR; INTRAVENOUS at 07:57

## 2023-02-16 RX ADMIN — ROCURONIUM BROMIDE 50 MG: 50 INJECTION, SOLUTION INTRAVENOUS at 07:37

## 2023-02-16 RX ADMIN — LIDOCAINE HYDROCHLORIDE 60 MG: 20 INJECTION, SOLUTION EPIDURAL; INFILTRATION; INTRACAUDAL; PERINEURAL at 07:37

## 2023-02-16 RX ADMIN — ONDANSETRON 4 MG: 2 INJECTION INTRAMUSCULAR; INTRAVENOUS at 08:56

## 2023-02-16 RX ADMIN — Medication 100 MCG: at 09:05

## 2023-02-16 RX ADMIN — FENTANYL CITRATE 50 MCG: 50 INJECTION, SOLUTION INTRAMUSCULAR; INTRAVENOUS at 08:02

## 2023-02-16 RX ADMIN — MIDAZOLAM 2 MG: 1 INJECTION INTRAMUSCULAR; INTRAVENOUS at 07:21

## 2023-02-16 ASSESSMENT — PAIN - FUNCTIONAL ASSESSMENT: PAIN_FUNCTIONAL_ASSESSMENT: 0-10

## 2023-02-16 ASSESSMENT — ENCOUNTER SYMPTOMS: SHORTNESS OF BREATH: 1

## 2023-02-16 NOTE — H&P
History and Physical Update    Pt Name: Kiesha Huitron  MRN: [id number]  YOB: 1950  Date of evaluation: 2/16/2023    [x] I have examined the patient and reviewed the H&P/Consult and there are no changes to the patient or plans.     [] I have examined the patient and reviewed the H&P/Consult and have noted the following changes:        Won Anguiano MD   Electronically signed 2/16/2023 at 6:35 AM

## 2023-02-16 NOTE — PROGRESS NOTES
Physical Therapy  Facility/Department: [facility] OR  Physical Therapy Initial Assessment - Day of Surgery     Name: Court Alaniz  : 1950  MRN: [id number]  Date of Service: 2023  RN reports patient is medically stable for therapy treatment this date. Chart reviewed prior to treatment and patient is agreeable for therapy. All lines intact and patient positioned comfortably at end of treatment. All patient needs addressed prior to ending therapy session. Discharge Recommendations:  Pt presenting with new musculoskeletal dysfunction and would benefit from additional therapy at time of discharge. Please refer to the AM-PAC score for current functional status. Patient would benefit from continued therapy after discharge        H&P / Procedure: L reverse TSA 2023, Dr. Laureen Bartholomew       Patient Diagnosis(es): The encounter diagnosis was Acute postoperative pain. Past Medical History:  has a past medical history of Arthritis, Asthma, Atrial fibrillation ([address] Hyperlipidemia, Hypertension, Mitral valve disorder, Moderate to severe pulmonary hypertension ([address] Osteoarthritis, Sleep apnea, Spinal stenosis at L4-L5 level, Stage 3b chronic kidney disease ([address] Tachycardia, and Tricuspid regurgitation. Past Surgical History:  has a past surgical history that includes knee joint manipulation (Right); joint replacement (Right); joint replacement (Left); Colonoscopy; Spine surgery (2017); Cardiac catheterization (2014); Cardiac catheterization (2016); Endoscopy, colon, diagnostic; and Cardiac surgery (2021). Assessment   Body Structures, Functions, Activity Limitations Requiring Skilled Therapeutic Intervention: Decreased functional mobility ; Decreased ADL status; Decreased ROM; Decreased balance;Decreased sensation;Decreased safe awareness;Decreased strength;Decreased cognition;Decreased coordination;Decreased high-level IADLs; Increased pain  Assessment: Pt tolerated PT eval fair s/p L rev TSA. Pt currently demonstrating fair steadiness throughout ambulation. Pt presenting w/ expected ROM & strength deficits post-op and would benefit from continued skilled PT to address deficits in order to return to PLOF as able. Therapy Prognosis: Good  Decision Making: Medium Complexity  Requires PT Follow-Up: Yes  Activity Tolerance  Activity Tolerance: Patient tolerated evaluation without incident     Plan   Physcial Therapy Plan  General Plan: 1 time a day 7 days a week  Current Treatment Recommendations: Strengthening, ROM, Functional mobility training, Balance training, Neuromuscular re-education, Stair training, Gait training, Pain management, Home exercise program, Patient/Caregiver education & training, Safety education & training, Therapeutic activities  Safety Devices  Type of Devices: Bed alarm in place, Call light within reach, Left in bed, Gait belt, Nurse notified  Restraints  Restraints Initially in Place: No     Restrictions  Restrictions/Precautions  Restrictions/Precautions: General Precautions, Fall Risk, Surgical protocol  Required Braces or Orthoses?: Yes  Required Braces or Orthoses  Left Upper Extremity Brace/Splint: Shoulder immobilizer  Position Activity Restriction  Other position/activity restrictions: Up w/ assist, RUE IV, polar care     Subjective   General  Patient assessed for rehabilitation services?: Yes  Response To Previous Treatment: Not applicable  Family / Caregiver Present: No  Follows Commands: Within Functional Limits  General Comment  Comments: RN and pt agreeable to therapy. Pt supine in bed upon arrival.  Pt cooperative throughout.   Subjective  Subjective: Pt reporting some numbness/tingling in L thumb otherwise feeling \"fine\" at time of PT eval.         Social/Functional History  Social/Functional History  Lives With: Alone  Type of Home: House  Home Layout: One level  Home Access: Stairs to enter with rails  Entrance Stairs - Number of Steps: 4  Entrance Stairs - Rails: Both  Bathroom Shower/Tub: Tub/Shower unit  Bathroom Toilet: Handicap height  Home Equipment: Cane  Has the patient had two or more falls in the past year or any fall with injury in the past year?: No (Pt reports \"I am almost falling all the time, I have a balance problem they can't figure out. \")  ADL Assistance: Independent  Homemaking Assistance: Independent  Homemaking Responsibilities: Yes  Ambulation Assistance: Independent (No AD use prior)  Transfer Assistance: Independent  Active : Yes  Type of Occupation: judges dog shows  Leisure & Hobbies: raise and show dogs  IADL Comments: Pt reports she plans on driving after surgery prior to doctors approval stating, \"people drive all the time with one arm. \" Attempted to educate on safety risk, pt with no evidence of learning. RN notified & aware. Additional Comments: Pt reports she will stay at her sisters house tonight but plans to go home without any assistance tommorrow. Vision/Hearing  Vision  Vision: Impaired  Vision Exceptions: Wears glasses at all times  Hearing  Hearing: Within functional limits    Cognition   Orientation  Overall Orientation Status: Within Functional Limits  Cognition  Overall Cognitive Status: Exceptions  Arousal/Alertness: Appropriate responses to stimuli  Following Commands:  Follows multistep commands with repitition  Attention Span: Difficulty attending to directions  Memory: Decreased short term memory  Safety Judgement: Decreased awareness of need for safety;Decreased awareness of need for assistance  Problem Solving: Decreased awareness of errors;Assistance required to identify errors made;Assistance required to correct errors made  Insights: Decreased awareness of deficits  Initiation: Does not require cues  Sequencing: Requires cues for some     Objective   Observation/Palpation  Posture: Fair  Observation: L TSA surgical dressing dry & intact; shoulder immobilizer in place upon arrival and upon writer's exit this date  Scar: prev B TKA  Gross Assessment  Sensation: Impaired (Pt reports acute numbness/tingling in L thumb, denies any other numbness/tingling)     AROM RLE (degrees)  RLE AROM: WFL  AROM LLE (degrees)  LLE AROM : WFL  AROM RUE (degrees)  RUE AROM : WFL  AROM LUE (degrees)  LUE General AROM: WFL at elbow & wrist; shoulder not assessed s/p L rev TSA  Strength RLE  Strength RLE: WFL  Comment: Grossly 4-/5  Strength LLE  Strength LLE: WFL  Comment: Grossly 4-/5  Strength RUE  Comment: See OT assessment for detail  Strength LUE  Comment: Not assessed s/p L rev TSA          Bed mobility  Supine to Sit: Contact guard assistance  Sit to Supine: Contact guard assistance  Scooting: Contact guard assistance  Bed Mobility Comments: Pt w/o significant difficulty throughout bed mobility this date however requiring increased time and effort to complete tasks throughout. Pt denying any dizziness/lightheadedness throughout position changes. Assist required for safe line mgmt throughout. Pt sat EOB for education on TSA HEP, use of shoulder immobilizer, use of polar care, and TSA precautions. Transfers  Sit to Stand: Contact guard assistance  Stand to Sit: Contact guard assistance  Comment: Pt demonstrating fair steadiness throughout STS transfers this date w/ increased time to gain steadiness prior to initiating ambulation. Pt denying any dizziness/lightheadedness throughout position changes. Pt also demonstrating fair eccentric quad control throughout stand>sit transfers w/ min verbal cueing. Ambulation  Surface: Level tile  Device: No Device  Assistance: Contact guard assistance;Minimal assistance  Quality of Gait: fair steadiness, slow pace, mild lateral trunk sway  Gait Deviations: Shuffles; Slow Denisse;Decreased step length;Decreased step height  Distance: 50ft  Comments: Pt demonstrating fair steadiness throughout ambulation w/o AD. Pt ambulating w/ slow pace and mild lateral trunk sway noted throughout. Assist required for safe line mgmt throughout. More Ambulation?: No  Stairs/Curb  Stairs?: No       Balance  Posture: Fair  Sitting - Static: Good  Sitting - Dynamic: Good;-  Standing - Static: Fair;+  Standing - Dynamic: Fair;-  Comments: Standing balance assessed w/o AD  Exercise Treatment: LUE: elbow flexion/extension, forearm supination/pronation, wrist flexion/extension        AM-PAC Score  AM-PAC Inpatient Mobility Raw Score : 18 (02/16/23 1611)  AM-PAC Inpatient T-Scale Score : 43.63 (02/16/23 1611)  Mobility Inpatient CMS 0-100% Score: 46.58 (02/16/23 1611)  Mobility Inpatient CMS G-Code Modifier : CK (02/16/23 1611)          Goals  Short Term Goals  Time Frame for Short Term Goals: 6 visits  Short Term Goal 1: Pt to demonstrate bed mobility independently  Short Term Goal 2: Pt to perform STS transfers w/o AD independently  Short Term Goal 3: Pt to ambulate at least 100ft w/o AD independently  Short Term Goal 4: Pt to ascend/descend 4 stairs w/ handrails SBA  Short Term Goal 5: Pt to be indep w/ rev TSA HEP  Patient Goals   Patient Goals : To go home       Education  Patient Education  Education Given To: Patient  Education Provided: Role of Therapy;Plan of Care;Home Exercise Program;Precautions; Equipment; Fall Prevention Strategies  Education Provided Comments: Significant time spent on pt education this date. Pt educated on: purpose of acute PT eval, importance of continued mobility throughout admission & upon discharge, general safety awareness, fall risk prevention, rev TSA HEP, rev TSA precautions, importance of following physician's orders including not driving post-op as directed (pt verbalizing poor understanding - states she will drive after surgery, JOVI Gonzales notified & aware), use of polar care, use of shoulder immobilizer including donning/doffing, and PT POC. Pt w/ fair understanding verbalized. Pt denying any questions/concerns upon writer's exit.   Education Method: Demonstration;Verbal;Printed Information/Hand-outs  Education Outcome: Continued education needed;Verbalized understanding      Therapy Time   Individual Concurrent Group Co-treatment   Time In 8962         Time Out 1214         Minutes 43         Treatment time: 23 minutes       Shahzad Montes, PT

## 2023-02-16 NOTE — OP NOTE
Operative Note      Patient: Dickson Baltazar  YOB: 1950  MRN: [id number]    Date of Procedure: 2/16/2023    Pre-Op Diagnosis: Primary osteoarthritis of left shoulder [M19.012]    Post-Op Diagnosis: Same       Procedure(s):  LEFT SHOULDER TOTAL ARTHROPLASTY REVERSE WITH SUPRASCAPULAR NERVE BLOCK TOURNIER    Surgeon(s):  Stephen Johnson MD    Assistant:   Surgical Assistant: Charlotte Louole; Shavonne Corea  Resident: Malini Murrell DO    Anesthesia: General    Estimated Blood Loss (mL): less than 593     Complications: None    Specimens:   * No specimens in log *    Implants:  Implant Name Type Inv. Item Serial No.  Lot No. LRB No. Used Action   IMPL SHOULDER BASE STD PERFM REV 25MM - [id number]  IMPL SHOULDER BASE STD PERFM REV 25MM [id number] [facility]  Left 1 Implanted   TORNIER PERFORM REVERSED GLENOID STANDARD GLENOSPHERE COCR 36MM   [id number]   Left 1 Implanted   TORNIER PERFORM HUMERAL SYSTEM HUMERAL STEM PLUS SHORT SIZE 1+   [id number]   Left 1 Implanted   TORNIER PERFORM HUMERAL SYSTEM REVERSED INSERT THICKNESS +0 MM SIZE 1/2 I 0:36MM   [id number]   Left 1 Implanted   SCREW PERFORM REV CENTRAL 6.5X30MM - [id number]  SCREW PERFORM REV CENTRAL 6.5X30MM  [facility]  Left 1 Implanted   SCREW PERF REVRSED PERIPHERAL 22MM - [id number]  SCREW PERF REVRSED PERIPHERAL 22MM  [facility]  Left 1 Implanted   SCREW PERFORM REV PERIPHERAL 34MM - [id number]  SCREW PERFORM REV PERIPHERAL 34MM  [facility]  Left 1 Implanted         Drains: * No LDAs found *    Findings: Severe glenohumeral joint disease with significant rotator cuff thinning    Detailed Description of Procedure: This is a 65-year-old female with a longstanding history of left shoulder pain and known avascular necrosis of the humerus.   She is elected to undergo a reverse total shoulder arthroplasty for treatment of her avascular necrosis with thinning of the rotator cuff. After informed consent was obtained the patient is brought to the operating room placed supine on the operating table and placed under anesthesia. She was then positioned in the beachchair position. A bump was placed behind the medial border of her scapula to aid in the glenoid visualization. All bony prominences were padded and she was secured to the table. Shoulder was scrubbed with a hydroperoxide solution and chlorhexidine soap and dried. The arm was then prepared with a ChloraPrep solution of intermittent drying time was draped in a sterile fashion. After the arm was prepped and draped a timeout was completed. An [place] drape was used to cover any exposed skin. After timeout was performed a spinal needle was inserted just posterior to the [facility] joint a suprascapular nerve block was performed using our Exparel and Marcaine solution. After that was done an incision was created from the inferior tip of her coracoid towards the deltoid attachment. Dissection was carried down through the skin down to the level of the deltoid. Our cephalic vein was identified and retracted medially. Conjoined tendon and pectoralis major were elevated and so was the deltoid deltoid gutter in the subacromial space were freed and a Brenton retractor was then inserted into position. Once the Brenton retractor was in place our biceps tendon was identified and a biceps tendon tenodesis was performed at the pectoralis major tendon attachment site. I biceps was then cut and dissected out proximally. The rotator interval was opened and the biceps tendon was cut at its attachment to the superior glenoid. A #5 Ethibond suture was then placed in the subscapularis and a subscap peel was performed off of the lesser tuberosity. After that was completed subscap was freed along its superior posterior and anterior borders. The axillary nerve was identified and protected throughout the procedure.   The arm board was then dropped off the table and the arm was held in full extension and the humerus was dislocated anteriorly. We then gained entry to the humeral canal at the apex of the humerus just posterior to the bicipital groove. We then placed our cutting guide and our proximal humeral osteotomy was performed. After a proximal humeral osteotomy was performed a size 2 Protective cap was placed on the humerus and a Fukuda retractor was inserted into the joint and the humerus was retracted posteriorly. Once this was performed our anterior glenoid retractor was placed and our labrum was removed in its entirety using electrocautery. Our 12:00 and 6:00 positions were identified on the glenoid as well as our 3 and 5:00 positions. We then placed our glenoid guide with a 10 degree inferior tilt onto the glenoid and our pin was placed bicortically. Digital palpation of the pin was able to be performed anteriorly confirming exit just anterior to the body of the scapula. After that was placed we then reamed our glenoid to get a concentric [place]. We then placed our glenoid baseplate with a 30 mm screw onto the glenoid giving us excellent initial fixation. 2 locking screws were then placed 1 superior and 1 inferior securing our glenoid baseplate. After this a 36 mm glenosphere was then placed over the glenoid baseplate and was secured using a centralized locking pin. This was then secondarily impacted and screwed 1 more time giving us excellent fixation of the glenosphere. The humerus was then brought anteriorly and the humeral canal was prepared. We began with a size 1 and went up to a 1+ this gave us excellent fixation of the trial standard baseplate was then placed on the humerus and the shoulder reduced reduced giving us excellent fixation. Shoulder was stable in all directions and we had restored nice tension to our conjoined and deltoid wrap.   Shoulder was then dislocated and our final humeral component a 1+ was impacted into the humerus and then a standard polyethylene plastic was inserted and the shoulder was again reduced. Shoulder was again brought through full range of motion and was very stable in all directions. Three #5 Ethibond sutures were placed through the lesser tuberosity and our subscapularis was repaired back to the bone using Doyle-Kraig sutures through the subscap. The shoulder was irrigated with a Betadine solution and injected with our Exparel and total joint solution. The deltopectoral interval was lightly closed with a 0 Vicryl suture and our skin was closed in layers. A 3-0 Monocryl followed by skin glue was used to seal the skin. An Aquacel dressing was placed and the patient's arm was placed in a sling and she was transported to recovery in stable condition.     Electronically signed by Gretchen Rivas MD on 2/16/2023 at 9:09 AM

## 2023-02-16 NOTE — H&P
Interval H&P Note    Pt Name: Sonny Mclean  MRN: [id number]  YOB: 1950  Date of evaluation: 2/16/2023      [x] I have reviewed in Epic the H&P by Otha Duverney, CNP dated 01/24/2023, attached below for an Interval History and Physical note. [x] I have examined  Sonny Mclean  There are no changes to the patient who is scheduled for LEFT SHOULDER TOTAL ARTHROPLASTY REVERSE - Zulema Shivers by Hyun Coello MD for Primary osteoarthritis of left shoulder [M19.012]. The patient denies new health changes, fever, chills, wheezing, cough, increased SOB, chest pain, open sores or wounds. Denies hx of diabetes. Last Xarelto 02/12/2023. Has significant history of atrial fibrillation s/p ablation 11/01/2021 and cardioversion 06/14/2022, hyperlipidemia, hypertension, congestive heart failure, mitral valve disorder, pulmonary hypertension, palpitations, shortness of breath, sleep apnea, tachycardia, and tricuspid regurgitation. She follows with cardiology Dr. Jameson Colon, last seen in office 12/21/2022. Denies any current chest pain, shortness of breath, palpitations, or lightheadedness. ECHO 01/30/2023:      Left Ventricle: Systolic function is normal with an ejection fraction   of 60-65%. Mitral Valve: There is mild regurgitation. There is no evidence of   mitral valve stenosis. Left Atrium: Left atrium is mildly dilated. The left atrial volume   index is 35.0 mL/m2. Aortic Valve: There is mild regurgitation. There is no evidence of   aortic valve stenosis. Right Ventricle: Systolic function is normal.    Tricuspid Valve: There is moderate regurgitation. There is no evidence   of tricuspid valve stenosis. There is mild pulmonary hypertension. Pericardium: There is no pericardial effusion. Vital signs: BP (!) 146/60   Pulse 54   Temp 98.4 °F (36.9 °C) (Temporal)   Resp 16   SpO2 100%     Allergies:  Patient has no known allergies.     Medications:    Prior to Admission medications Medication Sig Start Date End Date Taking? Authorizing Provider   spironolactone (ALDACTONE) 25 MG tablet Take 25 mg by mouth daily 22   Historical Provider, MD   valsartan (DIOVAN) 40 MG tablet Take 40 mg by mouth daily 22   Historical Provider, MD   amoxicillin (AMOXIL) 500 MG capsule TAKE 4 CAPSULES BY MOUTH 1 HOUR BEFORE DENTAL PROCEDURE 22   Historical Provider, MD   amiodarone (CORDARONE) 200 MG tablet Take 1 tablet by mouth daily 22  Bina Rodriguez MD   metoprolol succinate (TOPROL XL) 25 MG extended release tablet Take 25 mg by mouth daily    Historical Provider, MD   vitamin D (CHOLECALCIFEROL) 25 MCG (1000 UT) TABS tablet Take 1,000 Units by mouth daily    Historical Provider, MD   Cyanocobalamin (VITAMIN B12 PO) Take 1 tablet by mouth daily    Historical Provider, MD   XARELTO 20 MG TABS tablet Take 20 mg by mouth Daily with supper  21   Historical Provider, MD   Multiple Vitamins-Minerals (THERAPEUTIC MULTIVITAMIN-MINERALS) tablet Take 1 tablet by mouth daily    Historical Provider, MD   Handicap Placard MISC by Does not apply route  5 years from origninal written date 2022    Dx: Osteoarthritis unable to ambulate over 150ft 17   Milla Singh MD         This is a 75 y.o. female who is pleasant, cooperative, alert and oriented x3, in no acute distress. Heart: Heart sounds are normal. HR 54 asymptomatic bradycardia regular rhythm without murmur, gallop or rub. Lungs: Normal respiratory effort with equal expansion, good air exchange, unlabored and clear to auscultation without wheezes or rales bilaterally   Abdomen: soft, nontender, nondistended with bowel sounds active. Extremities: Radial and pedal pulses palpable bilaterally. No pedal edema or calf tenderness bilaterally. Palmar grasps 5/5 bilaterally.     Labs:  No results for input(s): HGB, HCT, WBC, MCV, PLT, NA, K, CL, CO2, BUN, CREATININE, GLUCOSE, INR, PROTIME, APTT, AST, ALT, LABALBU, HCG in the last 720 hours. No results for input(s): COVID19 in the last 720 hours. NITESH Bee CNP  Electronically signed 2/16/2023 at [place] NITESH Krueger CNP   Nurse Practitioner   General Surgery   H&P      Signed   Date of Service:  1/24/2023  1:30 PM          Related encounter: Pre-Admission Testing Visit 30 min from 1/24/2023 in [facility] PRE-ADMIT TESTING     History and Physical Service   [facility]      HISTORY AND PHYSICAL EXAMINATION            Date of Evaluation:     1/24/2023  Patient name:              Bria Mckeon  MRN:                           [id number]  YOB: 1950  PCP:                            Lacie Koyanagi     History Obtained From:      Patient, medical records     History of Present Illness: This is Bria Mckeon a 75 y.o. female who presents for a pre-admission testing appointment for an upcoming [address] by Jt Murray MD scheduled on 02/16/2023 at 0730 due to Primary osteoarthritis of left shoulder [M19.012]. The patient's chief complaint is aching left shoulder pain which has progressively worsened over the past seven months. Shoulder pain is aggravated by any backwards movement of the arm, lifting overhead, or holding any weight in the arm and is minimally relieved with rest, avoiding aggravating movements. Prior treatment includes physical therapy with no improvement. Denies recent falls and injuries. Has significant history of atrial fibrillation s/p ablation 11/01/2021 and cardioversion 06/14/2022, hyperlipidemia, hypertension, congestive heart failure, mitral valve disorder, pulmonary hypertension, palpitations, shortness of breath, sleep apnea, tachycardia, and tricuspid regurgitation. She follows with cardiology Dr. Theodora Jon, last seen in office 12/21/2022.  Has upcoming repeat ECHO scheduled for 01/30/2022 to assess EF as last was 40% in April. Denies any current chest pain, palpitations, shortness of breath, or lightheadedness. ECHO 04/13/2022:     Summary  Left ventricle is normal in size  Global left ventricular systolic function is moderately reduced  Estimated ejection fraction is 40 % . Left atrium is moderate to severely dilated. Right atrium is moderately dilated . Normal mitral valve structure and function. Mild to moderate mitral regurgitation. Multiple MR jets noted. Mild to moderate tricuspid regurgitation. Functional Capacity:  1) Pt is able to walk 2 city blocks on level ground without SOB. 2) Pt is able to climb 2 flights of stairs without SOB. 3) Pt is not able to walk up a hill for 1-2 city blocks without SOB. Past Medical History:      Past Medical History        Past Medical History:   Diagnosis Date    Arthritis       RA    Asthma       Denies. Does not have inhalers    Atrial fibrillation (HCC)       not recurrent    Hyperlipidemia       diet controlled    Hypertension      Mitral valve disorder      Moderate to severe pulmonary hypertension ([address] 05/09/2018    Osteoarthritis      Sleep apnea       not on cpap    Spinal stenosis at L4-L5 level      Stage 3b chronic kidney disease ([address] 2020    Tachycardia      Tricuspid regurgitation       mild            Past Surgical History:      Past Surgical History         Past Surgical History:   Procedure Laterality Date    CARDIAC CATHETERIZATION   05/19/2014    CARDIAC CATHETERIZATION   01/20/2016     no stents    CARDIAC SURGERY   11/01/2021     ablation for a-fib    COLONOSCOPY        ENDOSCOPY, COLON, DIAGNOSTIC        JOINT REPLACEMENT Right       total knee    JOINT REPLACEMENT Left       total knee    KNEE JOINT MANIPULATION Right      SPINE SURGERY   07/2017     lumbar fusion            Medications Prior to Admission:      Home Medications           Prior to Admission medications    Medication Sig Start Date End Date Taking?  Authorizing Provider spironolactone (ALDACTONE) 25 MG tablet Take 25 mg by mouth daily 22     Historical Provider, MD   valsartan (DIOVAN) 40 MG tablet Take 40 mg by mouth daily 22     Historical Provider, MD   amoxicillin (AMOXIL) 500 MG capsule TAKE 4 CAPSULES BY MOUTH 1 HOUR BEFORE DENTAL PROCEDURE 22     Historical Provider, MD   amiodarone (CORDARONE) 200 MG tablet Take 1 tablet by mouth daily 22   Nakia Tarango MD   metoprolol succinate (TOPROL XL) 25 MG extended release tablet Take 25 mg by mouth daily       Historical Provider, MD   vitamin D (CHOLECALCIFEROL) 25 MCG (1000 UT) TABS tablet Take 1,000 Units by mouth daily       Historical Provider, MD   Cyanocobalamin (VITAMIN B12 PO) Take 1 tablet by mouth daily       Historical Provider, MD   XARELTO 20 MG TABS tablet Take 20 mg by mouth Daily with supper  21     Historical Provider, MD   Multiple Vitamins-Minerals (THERAPEUTIC MULTIVITAMIN-MINERALS) tablet Take 1 tablet by mouth daily       Historical Provider, MD   Handicap Placard MISC by Does not apply route  5 years from origninal written date 2022     Dx: Osteoarthritis unable to ambulate over 150ft 17     Héctor Simpson MD            Allergies:      Patient has no known allergies. Social History:      Tobacco:    reports that she has never smoked. She has never used smokeless tobacco.  Alcohol:      reports current alcohol use of about 5.0 standard drinks per week. Drug Use:  reports no history of drug use. Family History:      Family History         Family History   Problem Relation Age of Onset    High Blood Pressure Mother      Arthritis Mother      Diabetes Father      Arthritis Sister              Review of Systems:      Positive and Negative as described in HPI. CONSTITUTIONAL: Unplanned weight loss approximately 40 pounds in 2 years- managed by PCP. Negative for fevers, chills, sweats, fatigue  HEENT: Contacts.  Negative for hearing changes, rhinorrhea, and throat pain. RESPIRATORY: Sleep apnea- was diagnosed \"years ago\" and had repeat study where she was told \"mild\"- no CPAP. Negative for shortness of breath, cough, congestion, and wheezing. CARDIOVASCULAR: See HPI. Negative for chest pain, blood clot  GASTROINTESTINAL: GERD Negative for nausea, vomiting, diarrhea, constipation, change in bowel habits, and abdominal pain. GENITOURINARY: Stage 3b chronic kidney disease- managed by nephrology. Negative for difficulty of urination, burning with urination, and frequency. INTEGUMENT: Bruise easily. Negative for rash, skin lesions. Instructed pt to call Dr. Bo Fraser as soon as possible if a rash or wound develops prior to surgery. Pt voiced understanding. HEMATOLOGIC/LYMPHATIC:  Negative for swelling/edema. ALLERGIC/IMMUNOLOGIC: Negative for urticaria and itching. ENDOCRINE: Negative for increase in thirst, increase in urination, and heat or cold intolerance. MUSCULOSKELETAL: See HPI. NEUROLOGICAL: Negative for headaches, dizziness, lightheadedness, numbness, and tingling extremities. BEHAVIOR/PSYCH: Negative for depression and anxiety. Physical Exam:   /68   Pulse 50   Temp 98.2 °F (36.8 °C) (Oral)   Resp 16   Ht 5' 8\" (1.727 m)   SpO2 99%   BMI 21.44 kg/m²   No LMP recorded. Patient is postmenopausal.  No obstetric history on file. No results for input(s): POCGLU in the last 72 hours. General Appearance:  Alert, well appearing, and in no acute distress. Mental status:  Oriented to person, place, and time. Head:  Normocephalic and atraumatic. Eye: Contacts No icterus, redness, pupils equal and reactive, extraocular eye movements intact, and conjunctiva clear. Ear:  Hearing grossly intact. Nose:  No drainage noted. Mouth:  Mucous membranes moist.  Neck:  Supple and no carotid bruits noted. Lungs:  Bilateral equal air entry, clear to auscultation, no wheezing, rales or rhonchi, and normal effort.   Cardiovascular: Asymptomatic bradycardia HR 50 Normal rhythm, no murmur, gallop, or rub. Abdomen:  Soft, non-tender, non-distended, and active bowel sounds. Neurologic:  Normal speech and cranial nerves II through XII grossly intact. Strength 5/5 bilaterally. Skin: No gross lesions, rashes, bruising, or bleeding on exposed skin area. Extremities: Posterior tibial pulses 2+ bilaterally. No pedal edema. No calf tenderness with palpation. Psych: Normal affect. Investigations:       Laboratory Testing:  Recent Results         Recent Results (from the past 24 hour(s))   EKG 12 Lead     Collection Time: 23  2:07 PM   Result Value Ref Range     Ventricular Rate 48 BPM     Atrial Rate 48 BPM     P-R Interval 172 ms     QRS Duration 100 ms     Q-T Interval 480 ms     QTc Calculation (Bazett) 428 ms     P Axis 83 degrees     R Axis 36 degrees     T Axis 78 degrees            No results for input(s): HGB, HCT, WBC, MCV, PLATELET, NA, K, CL, CO2, BUN, CREATININE, GLUCOSE, INR, PROTIME, APTT, AST, ALT, LABALBU, HCG in the last 720 hours. No results for input(s): COVID19 in the last 720 hours. *Please note that labs listed above are the most recent lab values available in EPIC at the time of the visit and additional labs may have been drawn or resulted since that time. Imaging/Diagnostics:        No results found. EK2023. See Epic. Diagnosis:       1. Primary osteoarthritis of left shoulder [M19.012]. Plans:      1.  LEFT SHOULDER TOTAL ARTHROPLASTY REVERSE - NITESH White - CNP  2023  2:37 PM               Cosigned by: Juvencio Mehta MD at 2023  6:58 AM

## 2023-02-16 NOTE — ANESTHESIA POSTPROCEDURE EVALUATION
Department of Anesthesiology  Postprocedure Note    Patient: Bruce Mac  MRN: [id number]  YOB: 1950  Date of evaluation: 2/16/2023      Procedure Summary     Date: 02/16/23 Room / Location: [address] [place] [address] / [facility] - INPATIENT    Anesthesia Start: 3100 Anesthesia Stop: 8344    Procedure: LEFT SHOULDER TOTAL ARTHROPLASTY REVERSE WITH SUPRASCAPULAR NERVE BLOCK TOURNIER (Left: Shoulder) Diagnosis:       Primary osteoarthritis of left shoulder      (Primary osteoarthritis of left shoulder [M19.012])    Surgeons: Chichi Villagran MD Responsible Provider: Elza Riddle MD    Anesthesia Type: general ASA Status: 4          Anesthesia Type: No value filed.     Adrián Phase I: Adrián Score: 10    Adrián Phase II: Adrián Score: 10      Anesthesia Post Evaluation    Complications: no

## 2023-02-16 NOTE — ANESTHESIA PRE PROCEDURE
Department of Anesthesiology  Preprocedure Note       Name:  Tejal Dang   Age:  75 y.o.  :  1950                                          MRN:  [id number]         Date:  2023      Surgeon: Rosamaria Donaldson):  Charmayne Peper, MD    Procedure: Procedure(s):  LEFT SHOULDER TOTAL ARTHROPLASTY REVERSE - Simonne Genin    Medications prior to admission:   Prior to Admission medications    Medication Sig Start Date End Date Taking? Authorizing Provider   rivaroxaban (XARELTO) 10 MG TABS tablet Take 1 tablet by mouth daily (with breakfast) for 9 days 23 Yes Charmayne Peper, MD   docusate sodium (COLACE) 100 MG capsule Take 1 capsule by mouth 2 times daily 23  Yes Charmayne Peper, MD   ondansetron [facility]) 4 MG tablet Take 1 tablet by mouth every 6 hours as needed for Nausea 23  Yes Charmayne Peper, MD   oxyCODONE-acetaminophen (PERCOCET) 5-325 MG per tablet Take 1-2 tablets by mouth every 4 hours as needed for Pain for up to 7 days.  Max Daily Amount: 12 tablets 23 Yes Charmayne Peper, MD   spironolactone (ALDACTONE) 25 MG tablet Take 25 mg by mouth daily 22   Historical Provider, MD   valsartan (DIOVAN) 40 MG tablet Take 40 mg by mouth daily 22   Historical Provider, MD   amoxicillin (AMOXIL) 500 MG capsule TAKE 4 CAPSULES BY MOUTH 1 HOUR BEFORE DENTAL PROCEDURE 22   Historical Provider, MD   amiodarone (CORDARONE) 200 MG tablet Take 1 tablet by mouth daily 22  Stephanie Porter MD   metoprolol succinate (TOPROL XL) 25 MG extended release tablet Take 25 mg by mouth daily    Historical Provider, MD   vitamin D (CHOLECALCIFEROL) 25 MCG (1000 UT) TABS tablet Take 1,000 Units by mouth daily    Historical Provider, MD   Cyanocobalamin (VITAMIN B12 PO) Take 1 tablet by mouth daily    Historical Provider, MD   Multiple Vitamins-Minerals (THERAPEUTIC MULTIVITAMIN-MINERALS) tablet Take 1 tablet by mouth daily    Historical Provider, MD   Handicap Placard [facility] [place] Does not apply route  5 years from origninal written date 2022    Dx: Osteoarthritis unable to ambulate over 150ft 17   Marsha Romero MD       Current medications:    Current Facility-Administered Medications   Medication Dose Route Frequency Provider Last Rate Last Admin    lidocaine PF 1 % injection 1 mL  1 mL IntraDERmal Once PRN Lenka Wall MD        0.9 % sodium chloride infusion   IntraVENous Continuous Ndal Andres Powell MD        lactated ringers IV soln infusion   IntraVENous Continuous Lenka Wall  mL/hr at 23 0641 New Bag at 23 0641    sodium chloride flush 0.9 % injection 5-40 mL  5-40 mL IntraVENous 2 times per day Lenka Wall MD        sodium chloride flush 0.9 % injection 5-40 mL  5-40 mL IntraVENous PRN Lenka Wall MD        0.9 % sodium chloride infusion   IntraVENous PRN Lenka Wall MD        ceFAZolin (ANCEF) 2000 mg in 0.9% sodium chloride 50 mL IVPB  2,000 mg IntraVENous Once Zora Blank MD        tranexamic acid (CYKLOKAPRON) 1000 MG/10ML injection             vancomycin (VANCOCIN) 1 g injection             povidone-iodine 10% in sodium chloride 0.9% irrigation             bupivacaine liposome (EXPAREL) 1.3 % injection                Allergies:  No Known Allergies    Problem List:    Patient Active Problem List   Diagnosis Code    Paroxysmal A-fib ([facility] I48.0    Primary osteoarthritis involving multiple joints M15.9    Dysthymia F34.1    Tricuspid regurgitation I07.1    Osteoarthritis M19.90    Spinal stenosis at L4-L5 level M48.061    Dyspnea on exertion R06.09    Moderate to severe pulmonary hypertension ([facility] I27.20    JOSE on CPAP G47.33, Z99.89    Deep vein thrombosis (DVT) of proximal lower extremity (HCC) I82.4Y9    Chronic 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)  I82.531    Embolism and thrombosis of both popliteal veins (HCC)  I82.433    Status post cardiac catheterization Z98.890    Asthmatic bronchitis, unspecified asthma severity, uncomplicated Y65.539    Balance problems R26.89    Chronic combined systolic and diastolic CHF (congestive heart failure) ([facility]) I50.42    Acute kidney injury superimposed on CKD (HCC) N17.9, N18.9    Essential hypertension I10    Gait disturbance R26.9    Idiopathic peripheral neuropathy G60.9    Laryngopharyngeal reflux (LPR) K21.9    Leg length discrepancy M21.70    Limp R26.89    Peripheral polyneuropathy G62.9    Primary osteoarthritis of right hip M16.11    Pulmonary scarring J98.4    Restrictive lung disease J98.4    Enthesopathy M77.9    Kyphosis of thoracic region M40.204    Spondylolisthesis at L5-S1 level M43.17    Spondylosis of lumbar region without myelopathy or radiculopathy M47.816    Supraventricular tachycardia ([facility]) I47.1    Trochanteric bursitis of right hip M70.61    Valvular heart disease I38    Osteoarthritis of knee M17.9    Acute on chronic combined systolic and diastolic CHF (congestive heart failure) ([facility]) I50.43    Atrial fibrillation with rapid ventricular response ([facility]) I48.91       Past Medical History:        Diagnosis Date    Arthritis     RA    Asthma     Denies.   Does not have inhalers    Atrial fibrillation (HCC)     not recurrent    Hyperlipidemia     diet controlled    Hypertension     Mitral valve disorder     Moderate to severe pulmonary hypertension ([address] 05/09/2018    Osteoarthritis     Sleep apnea     not on cpap    Spinal stenosis at L4-L5 level     Stage 3b chronic kidney disease ([address] 2020    Tachycardia     Tricuspid regurgitation     mild       Past Surgical History:        Procedure Laterality Date    CARDIAC CATHETERIZATION  05/19/2014    CARDIAC CATHETERIZATION  01/20/2016    no stents    CARDIAC SURGERY  11/01/2021    ablation for a-fib    COLONOSCOPY      ENDOSCOPY, COLON, DIAGNOSTIC      JOINT REPLACEMENT Right     total knee    JOINT REPLACEMENT Left     total knee    KNEE JOINT MANIPULATION Right     SPINE SURGERY  07/2017    lumbar fusion       Social History:    Social History     Tobacco Use    Smoking status: Never    Smokeless tobacco: Never   Substance Use Topics    Alcohol use: Yes     Alcohol/week: 5.0 standard drinks     Types: 5 Cans of beer per week     Comment: weekly                                Counseling given: Not Answered      Vital Signs (Current):   Vitals:    02/16/23 0605 02/16/23 0618   BP: (!) 146/60    Pulse: 54    Resp: 16    Temp: 98.4 °F (36.9 °C)    TempSrc: Temporal    SpO2: 100%    Weight:  141 lb (64 kg)   Height:  5' 8\" (1.727 m)                                              BP Readings from Last 3 Encounters:   02/16/23 (!) 146/60   01/24/23 124/68   06/03/22 105/60       NPO Status: Time of last liquid consumption: 2100                        Time of last solid consumption: 2100                        Date of last liquid consumption: 02/15/23                        Date of last solid food consumption: 02/15/23    BMI:   Wt Readings from Last 3 Encounters:   02/16/23 141 lb (64 kg)   06/03/22 141 lb (64 kg)   04/13/22 137 lb 1 oz (62.2 kg)     Body mass index is 21.44 kg/m².     CBC:   Lab Results   Component Value Date/Time    WBC 5.9 05/31/2022 07:55 AM    RBC 4.04 05/31/2022 07:55 AM    HGB 10.7 05/31/2022 07:55 AM    HCT 36.3 05/31/2022 07:55 AM    MCV 89.9 05/31/2022 07:55 AM    RDW 17.6 05/31/2022 07:55 AM     05/31/2022 07:55 AM       CMP:   Lab Results   Component Value Date/Time     06/03/2022 05:07 AM    K 4.3 06/03/2022 05:07 AM     06/03/2022 05:07 AM    CO2 25 06/03/2022 05:07 AM    BUN 45 06/03/2022 05:07 AM    CREATININE 1.80 06/03/2022 05:07 AM    CREATININE 1.83 06/03/2022 05:07 AM    GFRAA 33 06/03/2022 05:07 AM    GFRAA 33 06/03/2022 05:07 AM    LABGLOM 28 06/03/2022 05:07 AM    LABGLOM 27 06/03/2022 05:07 AM    GLUCOSE 112 06/03/2022 05:07 AM    PROT 6.9 05/31/2022 07:55 AM    CALCIUM 9.1 06/03/2022 05:07 AM BILITOT 0.56 05/31/2022 07:55 AM    ALKPHOS 121 05/31/2022 07:55 AM    AST 24 05/31/2022 07:55 AM    ALT 25 05/31/2022 07:55 AM       POC Tests: No results for input(s): POCGLU, POCNA, POCK, POCCL, POCBUN, POCHEMO, POCHCT in the last 72 hours. Coags: No results found for: PROTIME, INR, APTT    HCG (If Applicable): No results found for: PREGTESTUR, PREGSERUM, HCG, HCGQUANT     ABGs: No results found for: PHART, PO2ART, [id number], SAG1IZK, BEART, [id number]     Type & Screen (If Applicable):  No results found for: LABABO, LABRH    Drug/Infectious Status (If Applicable):  No results found for: HIV, HEPCAB    COVID-19 Screening (If Applicable): No results found for: COVID19        Anesthesia Evaluation    Airway: Mallampati: I  TM distance: >3 FB   Neck ROM: full  Mouth opening: > = 3 FB   Dental:          Pulmonary:   (+) shortness of breath: chronic and no interval change,  sleep apnea:                             Cardiovascular:    (+) hypertension:, dysrhythmias: atrial fibrillation, CHF:,     (-)  angina                Neuro/Psych:               GI/Hepatic/Renal:             Endo/Other:                     Abdominal:             Vascular:           Other Findings:           Anesthesia Plan      general     ASA 4                                   Jesusita Malin MD   2/16/2023

## 2023-02-16 NOTE — PROGRESS NOTES
Occupational Therapy  Facility/Department: [facility] OR  Occupational Therapy Initial Assessment    Name: Marky Dejesus  : 1950  MRN: [id number]  Date of Service: 2023    RN Precious Vinson reports patient is medically stable for therapy treatment this date. Chart reviewed prior to treatment and patient is agreeable for therapy. All lines intact and patient positioned comfortably at end of treatment. All patient needs addressed prior to ending therapy session. Discharge Recommendations:     OT Equipment Recommendations  Equipment Needed: Yes  Mobility Devices: ADL Assistive Devices  ADL Assistive Devices: Emergency Alert System;Long-handled Shoe Horn;Long-handled Sponge;Reacher;Grab Bars - shower; Shower Chair with back       Patient Diagnosis(es): The encounter diagnosis was Acute postoperative pain. Past Medical History:  has a past medical history of Arthritis, Asthma, Atrial fibrillation ([address] Hyperlipidemia, Hypertension, Mitral valve disorder, Moderate to severe pulmonary hypertension ([address] Osteoarthritis, Sleep apnea, Spinal stenosis at L4-L5 level, Stage 3b chronic kidney disease ([address] Tachycardia, and Tricuspid regurgitation. Past Surgical History:  has a past surgical history that includes knee joint manipulation (Right); joint replacement (Right); joint replacement (Left); Colonoscopy; Spine surgery (2017); Cardiac catheterization (2014); Cardiac catheterization (2016); Endoscopy, colon, diagnostic; and Cardiac surgery (2021). ALBA Ascencio       Assessment   Performance deficits / Impairments: Decreased functional mobility ; Decreased ADL status; Decreased safe awareness;Decreased cognition;Decreased posture;Decreased coordination;Decreased balance;Decreased high-level IADLs;Decreased endurance;Decreased strength;Decreased sensation;Decreased fine motor control  Assessment: Pt would benefit from continued skilled OT services to increase I and safety during functional tasks to return home at [facility] as able. Prognosis: Good  Decision Making: Medium Complexity  Activity Tolerance  Activity Tolerance: Patient Tolerated treatment well  Activity Tolerance Comments: good        Plan   Occupational Therapy Plan  Times Per Week: 4-5x/wk 1x/day as berny  Current Treatment Recommendations: Strengthening, Balance training, Functional mobility training, Endurance training, Safety education & training, Equipment evaluation, education, & procurement, Patient/Caregiver education & training, Self-Care / ADL, Home management training     Restrictions  Restrictions/Precautions  Restrictions/Precautions: General Precautions, Fall Risk, Surgical protocol  Required Braces or Orthoses?: Yes  Required Braces or Orthoses  Left Upper Extremity Brace/Splint: Shoulder immobilizer  Position Activity Restriction  Other position/activity restrictions: Up w/ assist, RUE IV, polar care    Subjective   General  Chart Reviewed: Yes  Patient assessed for rehabilitation services?: Yes  Family / Caregiver Present: No  Subjective  Subjective: Pt resting in bed, agreeable to OT eval. Pt cooperative with session, talkative requiring frequent redirection/cues to listen to directions. General Comment  Comments: Pt reporting pain in surgical shld, RN aware. Social/Functional History  Social/Functional History  Lives With: Alone  Type of Home: House  Home Layout: One level  Home Access: Stairs to enter with rails  Entrance Stairs - Number of Steps: 4  Entrance Stairs - Rails: Both  Bathroom Shower/Tub: Tub/Shower unit  Bathroom Toilet: Handicap height  Home Equipment: Cane  Has the patient had two or more falls in the past year or any fall with injury in the past year?: No (Pt reports \"I am almost falling all the time, I have a balance problem they can't figure out. \")  ADL Assistance: Independent  Homemaking Assistance: Independent  Homemaking Responsibilities: Yes  Ambulation Assistance: Independent (No AD use prior)  Transfer Assistance: Independent  Active : Yes  Type of Occupation: judges dog shows  Leisure & Hobbies: raise and show dogs  IADL Comments: Pt reports she plans on driving after surgery prior to doctors approval stating, \"people drive all the time with one arm. \" Attempted to educate on safety risk, pt with no evidence of learning. RN notified & aware. Additional Comments: Pt reports she will stay at her sisters house tonight but plans to go home without any assistance tommorrow. Objective   Observation/Palpation  Posture: Fair  Observation: L TSA surgical dressing dry & intact; shoulder immobilizer in place upon arrival and upon writer's exit this date  Scar: prev B TKA  Safety Devices  Type of Devices: Bed alarm in place;Call light within reach; Left in bed;Patient at risk for falls;Nurse notified;Gait belt    Balance  Sitting:  (SBA)  Standing:  (CGA)  Functional Mobility   Overall Level of Assistance: Contact-guard assistance (Pt completed functional mobility from bed down PACU hallway to door and back to bed with no AD)  Interventions: Verbal cues; Tactile cues (Pt given Mod verbal cues for slowing down, pacing self, upright posture, pursed lip breathing, awareness/assist with lines all to increase safety and reduce risk of falls.)       AROM: Generally decreased, functional (L shld not tested, rest [facility])  Strength: Generally decreased, functional (R UE ~ 4/5)  Coordination: Generally decreased, functional  Tone: Normal  Sensation: Impaired (L thumb is currently numb)      ADL  Feeding: Setup  Grooming: Setup;Minimal assistance (seated)  UE Bathing: Setup; Moderate assistance  LE Bathing: Setup;Maximum assistance  UE Dressing: Setup; Moderate assistance;Maximum assistance (for donning gown and proper donning of shld immobilizer while seated on EOB.)  LE Dressing: Dependent/Total;Maximum assistance;Setup  Toileting:  Moderate assistance  Additional Comments: Pt educated on safe ADL completion stratigies, including sitting vs standing, DME recommendations (shower chair), dressing L UE first, use of medicated soap for infection prevention and proper donning/doffing of shld immobilzer. Pt verbalized good understanding of all education provided. Bed mobility  Sit to Supine: Contact guard assistance  Scooting: Contact guard assistance  Bed Mobility Comments: Pt completed bed mobility without signficant diffiuclites this date. Increased time/effort required. Mod verbal cues given for pacing self, pursed lip breathing, line mgmt, slowing down and protecting surgical shld. Pt denied dizziness once seated on EOB. Transfers  Sit to stand: Contact guard assistance  Stand to sit: Contact guard assistance  Transfer Comments: Pt givne Min verbal cues for pacing self, upright posture, controlled stand to sit, reaching back to surface prior to sitting all to increase safety. Vision  Vision: Impaired  Vision Exceptions: Wears glasses at all times  Hearing  Hearing: Within functional limits      Cognition  Overall Cognitive Status: Exceptions  Arousal/Alertness: Appropriate responses to stimuli  Following Commands: Follows multistep commands with repitition  Attention Span: Difficulty attending to directions  Memory: Decreased short term memory  Safety Judgement: Decreased awareness of need for safety;Decreased awareness of need for assistance  Problem Solving: Decreased awareness of errors;Assistance required to identify errors made;Assistance required to correct errors made  Insights: Decreased awareness of deficits  Initiation: Does not require cues  Sequencing: Requires cues for some  Orientation  Overall Orientation Status: Within Functional Limits                  Education Given To: Patient  Education Provided: Role of Therapy;Transfer Training;Equipment;Plan of Care;Energy Conservation; Fall Prevention Strategies; ADL Adaptive Strategies  Education Provided Comments: safety in function, fall prevention/call light use, OT POC, role of OT in acute care, recommendations for DME,AE and discharge, recommendations for continued therapy, proper bed mobility tech, proper shld immobilzer wear/donning/doffing, use of medicated soap for infection prevention, polar care use  Education Method: Verbal  Barriers to Learning: None  Education Outcome: Verbalized understanding                                    AM-PAC Score        AM-PAC Inpatient Daily Activity Raw Score: 15 (02/16/23 1621)  AM-PAC Inpatient ADL T-Scale Score : 34.69 (02/16/23 1621)  ADL Inpatient CMS 0-100% Score: 56.46 (02/16/23 1621)  ADL Inpatient CMS G-Code Modifier : CK (02/16/23 1621)         Goals  Short Term Goals  Time Frame for Short Term Goals: By discharge, pt to demo  Short Term Goal 1: ADL transfers and functional mobility to SBA with use of AD as needed. Short Term Goal 2: bed mobility to Mod I with use of bedrails as needed. Short Term Goal 3: UB ADLs to Min and LB ADLs to Mod A with use of AD/AE as needed with proper safety awareness. Short Term Goal 4: toileting SBA with use of AD/grab bars as needed. Short Term Goal 5: I with fall prevention education, EC/WS Tech, proper shld immobilizer wear, recommendations for AE/discharge with use of handouts as needed. Patient Goals   Patient goals : To go home!        Therapy Time   Individual Concurrent Group Co-treatment   Time In 1111         Time Out 1155         Minutes 44          Tx time: 34 min       Lore Aschoff, OT

## 2023-05-06 ENCOUNTER — HOSPITAL ENCOUNTER (EMERGENCY)
Age: 73
Discharge: HOME | End: 2023-05-06
Attending: EMERGENCY MEDICINE
Payer: MEDICARE

## 2023-05-06 ENCOUNTER — APPOINTMENT (OUTPATIENT)
Dept: CT IMAGING | Age: 73
End: 2023-05-06
Payer: MEDICARE

## 2023-05-06 VITALS
TEMPERATURE: 98.6 F | DIASTOLIC BLOOD PRESSURE: 73 MMHG | WEIGHT: 141 LBS | RESPIRATION RATE: 18 BRPM | BODY MASS INDEX: 21.44 KG/M2 | OXYGEN SATURATION: 98 % | SYSTOLIC BLOOD PRESSURE: 147 MMHG | HEART RATE: 56 BPM

## 2023-05-06 DIAGNOSIS — R19.7 DIARRHEA, UNSPECIFIED TYPE: ICD-10-CM

## 2023-05-06 DIAGNOSIS — R10.84 GENERALIZED ABDOMINAL PAIN: ICD-10-CM

## 2023-05-06 DIAGNOSIS — D73.4 SPLENIC CYST: Primary | ICD-10-CM

## 2023-05-06 LAB
ABSOLUTE EOS #: 0.11 K/UL (ref 0–0.44)
ABSOLUTE IMMATURE GRANULOCYTE: 0 K/UL (ref 0–0.3)
ABSOLUTE LYMPH #: 0.45 K/UL (ref 1.1–3.7)
ABSOLUTE MONO #: 0.5 K/UL (ref 0.1–1.2)
ALBUMIN SERPL-MCNC: 4.2 G/DL (ref 3.5–5.2)
ALP SERPL-CCNC: 70 U/L (ref 35–104)
ALT SERPL-CCNC: 34 U/L (ref 5–33)
ANION GAP SERPL CALCULATED.3IONS-SCNC: 10 MMOL/L (ref 9–17)
AST SERPL-CCNC: 35 U/L
BASOPHILS # BLD: 1 % (ref 0–2)
BASOPHILS ABSOLUTE: 0.06 K/UL (ref 0–0.2)
BILIRUB DIRECT SERPL-MCNC: 0.2 MG/DL
BILIRUB INDIRECT SERPL-MCNC: 0.4 MG/DL (ref 0–1)
BILIRUB SERPL-MCNC: 0.6 MG/DL (ref 0.3–1.2)
BILIRUBIN URINE: NEGATIVE
BUN SERPL-MCNC: 37 MG/DL (ref 8–23)
BUN/CREAT BLD: 22 (ref 9–20)
CALCIUM SERPL-MCNC: 9.5 MG/DL (ref 8.6–10.4)
CHLORIDE SERPL-SCNC: 108 MMOL/L (ref 98–107)
CO2 SERPL-SCNC: 22 MMOL/L (ref 20–31)
COLOR: YELLOW
CREAT SERPL-MCNC: 1.66 MG/DL (ref 0.5–0.9)
EOSINOPHILS RELATIVE PERCENT: 2 % (ref 1–4)
GFR SERPL CREATININE-BSD FRML MDRD: 32 ML/MIN/1.73M2
GLUCOSE SERPL-MCNC: 108 MG/DL (ref 70–99)
GLUCOSE UR STRIP.AUTO-MCNC: NEGATIVE MG/DL
HCT VFR BLD AUTO: 37.7 % (ref 36.3–47.1)
HGB BLD-MCNC: 12.2 G/DL (ref 11.9–15.1)
IMMATURE GRANULOCYTES: 0 %
INR PPP: 1.6
KETONES UR STRIP.AUTO-MCNC: NEGATIVE MG/DL
LEUKOCYTE ESTERASE UR QL STRIP.AUTO: NEGATIVE
LIPASE SERPL-CCNC: 24 U/L (ref 13–60)
LYMPHOCYTES # BLD: 8 % (ref 24–43)
MCH RBC QN AUTO: 31.8 PG (ref 25.2–33.5)
MCHC RBC AUTO-ENTMCNC: 32.4 G/DL (ref 28.4–34.8)
MCV RBC AUTO: 98.2 FL (ref 82.6–102.9)
MONOCYTES # BLD: 9 % (ref 3–12)
NITRITE UR QL STRIP.AUTO: NEGATIVE
NRBC AUTOMATED: 0 PER 100 WBC
PDW BLD-RTO: 13.9 % (ref 11.8–14.4)
PLATELET # BLD AUTO: 150 K/UL (ref 138–453)
PMV BLD AUTO: 10.2 FL (ref 8.1–13.5)
POTASSIUM SERPL-SCNC: 4.9 MMOL/L (ref 3.7–5.3)
POTASSIUM SERPL-SCNC: 5.5 MMOL/L (ref 3.7–5.3)
PROT SERPL-MCNC: 6.8 G/DL (ref 6.4–8.3)
PROT UR STRIP.AUTO-MCNC: 5.5 MG/DL (ref 5–8)
PROT UR STRIP.AUTO-MCNC: NEGATIVE MG/DL
PROTHROMBIN TIME: 18.3 SEC (ref 11.5–14.2)
RBC # BLD: 3.84 M/UL (ref 3.95–5.11)
SEG NEUTROPHILS: 80 % (ref 36–65)
SEGMENTED NEUTROPHILS ABSOLUTE COUNT: 4.48 K/UL (ref 1.5–8.1)
SODIUM SERPL-SCNC: 140 MMOL/L (ref 135–144)
SPECIFIC GRAVITY UA: 1.01 (ref 1–1.03)
TURBIDITY: CLEAR
URINE HGB: NEGATIVE
UROBILINOGEN, URINE: NORMAL
WBC # BLD AUTO: 5.6 K/UL (ref 3.5–11.3)

## 2023-05-06 PROCEDURE — 80076 HEPATIC FUNCTION PANEL: CPT

## 2023-05-06 PROCEDURE — 81003 URINALYSIS AUTO W/O SCOPE: CPT

## 2023-05-06 PROCEDURE — 99285 EMERGENCY DEPT VISIT HI MDM: CPT

## 2023-05-06 PROCEDURE — 85025 COMPLETE CBC W/AUTO DIFF WBC: CPT

## 2023-05-06 PROCEDURE — 74176 CT ABD & PELVIS W/O CONTRAST: CPT

## 2023-05-06 PROCEDURE — 83690 ASSAY OF LIPASE: CPT

## 2023-05-06 PROCEDURE — 85610 PROTHROMBIN TIME: CPT

## 2023-05-06 PROCEDURE — 2500000003 HC RX 250 WO HCPCS: Performed by: EMERGENCY MEDICINE

## 2023-05-06 PROCEDURE — 80048 BASIC METABOLIC PNL TOTAL CA: CPT

## 2023-05-06 PROCEDURE — 2580000003 HC RX 258: Performed by: EMERGENCY MEDICINE

## 2023-05-06 PROCEDURE — 84132 ASSAY OF SERUM POTASSIUM: CPT

## 2023-05-06 RX ORDER — 0.9 % SODIUM CHLORIDE 0.9 %
1000 INTRAVENOUS SOLUTION INTRAVENOUS ONCE
Status: COMPLETED | OUTPATIENT
Start: 2023-05-06 | End: 2023-05-06

## 2023-05-06 RX ORDER — ONDANSETRON 4 MG/1
4 TABLET, ORALLY DISINTEGRATING ORAL 3 TIMES DAILY PRN
Qty: 21 TABLET | Refills: 0 | Status: SHIPPED | OUTPATIENT
Start: 2023-05-06

## 2023-05-06 RX ADMIN — BARIUM SULFATE 450 ML: 20 SUSPENSION ORAL at 13:30

## 2023-05-06 RX ADMIN — SODIUM CHLORIDE 1000 ML: 9 INJECTION, SOLUTION INTRAVENOUS at 10:56

## 2023-05-06 ASSESSMENT — ENCOUNTER SYMPTOMS
ABDOMINAL PAIN: 1
EYE PAIN: 0
NAUSEA: 0
SHORTNESS OF BREATH: 0
TROUBLE SWALLOWING: 0
VOICE CHANGE: 0
FACIAL SWELLING: 0
CHEST TIGHTNESS: 0
VOMITING: 0
PHOTOPHOBIA: 0
COLOR CHANGE: 0
BACK PAIN: 0
DIARRHEA: 1

## 2023-05-06 ASSESSMENT — PAIN - FUNCTIONAL ASSESSMENT: PAIN_FUNCTIONAL_ASSESSMENT: 0-10

## 2023-05-06 ASSESSMENT — PAIN SCALES - GENERAL: PAINLEVEL_OUTOF10: 6

## 2023-05-06 ASSESSMENT — PAIN DESCRIPTION - LOCATION: LOCATION: ABDOMEN

## 2023-05-06 NOTE — ED PROVIDER NOTES
days      [facility] ED  [address] [facility]  [phone]  Go to   As needed, If symptoms worsen    Caitlin López MD  [address]  [address] [facility] [address] [phone]    Schedule an appointment as soon as possible for a visit in 2 days      Rosendo Shannon MD  [address] [facility] [address] [phone]    Schedule an appointment as soon as possible for a visit in 2 days  splenic cyst      DO Jose Cruz Hawkins DO  05/06/23 2222

## 2023-07-12 LAB
T4 FREE: 1.52 NG/DL
TSH SERPL DL<=0.05 MIU/L-ACNC: 4.24 MIU/L

## 2023-12-19 NOTE — CARE COORDINATION
Pt has a new non [facility] pcp did cancel her apt with Dr Rony Nicholas she had scheduled in July Simón Hoffman is a 30 year old male here presenting with:    Reported symptoms: ST    Symptoms present for: 1 week     Denies: N/V, diarrhea, fever, cough, congestion     OTC medications/Home remedy: tylenol     Recent ABX use: no    Work note needed: no    Patient would like communication of their results via:      Cell Phone:   Telephone Information:   Mobile [phone]     Okay to leave a message containing results? Yes

## 2024-03-08 ENCOUNTER — APPOINTMENT (OUTPATIENT)
Age: 74
DRG: 308 | End: 2024-03-08
Attending: HOSPITALIST
Payer: MEDICARE

## 2024-03-08 ENCOUNTER — HOSPITAL ENCOUNTER (INPATIENT)
Age: 74
LOS: 4 days | Discharge: ANOTHER HOSPITAL | DRG: 308 | End: 2024-03-12
Attending: EMERGENCY MEDICINE | Admitting: HOSPITALIST
Payer: MEDICARE

## 2024-03-08 ENCOUNTER — APPOINTMENT (OUTPATIENT)
Dept: GENERAL RADIOLOGY | Age: 74
DRG: 308 | End: 2024-03-08
Payer: MEDICARE

## 2024-03-08 DIAGNOSIS — I50.9 ACUTE CONGESTIVE HEART FAILURE, UNSPECIFIED HEART FAILURE TYPE (HCC): Primary | ICD-10-CM

## 2024-03-08 DIAGNOSIS — I48.91 ATRIAL FIBRILLATION, UNSPECIFIED TYPE (HCC): ICD-10-CM

## 2024-03-08 DIAGNOSIS — I48.11 LONGSTANDING PERSISTENT ATRIAL FIBRILLATION (HCC): ICD-10-CM

## 2024-03-08 LAB
ANION GAP SERPL CALCULATED.3IONS-SCNC: 13 MMOL/L (ref 9–17)
BASOPHILS # BLD: 0.05 K/UL (ref 0–0.2)
BASOPHILS NFR BLD: 1 % (ref 0–2)
BNP SERPL-MCNC: 6378 PG/ML
BUN SERPL-MCNC: 34 MG/DL (ref 8–23)
BUN/CREAT SERPL: 26 (ref 9–20)
CALCIUM SERPL-MCNC: 9.3 MG/DL (ref 8.6–10.4)
CHLORIDE SERPL-SCNC: 107 MMOL/L (ref 98–107)
CO2 SERPL-SCNC: 22 MMOL/L (ref 20–31)
CREAT SERPL-MCNC: 1.3 MG/DL (ref 0.5–0.9)
ECHO AO ROOT DIAM: 2.7 CM
ECHO AO ROOT INDEX: 1.45 CM/M2
ECHO AR MAX VEL PISA: 3.8 M/S
ECHO AV PEAK GRADIENT: 5 MMHG
ECHO AV PEAK VELOCITY: 1.1 M/S
ECHO AV REGURGITANT PHT: 743 MS
ECHO BSA: 1.87 M2
ECHO EST RA PRESSURE: 20 MMHG
ECHO LA AREA 4C: 34.3 CM2
ECHO LA DIAMETER INDEX: 2.47 CM/M2
ECHO LA DIAMETER: 4.6 CM
ECHO LA MAJOR AXIS: 8.3 CM
ECHO LA TO AORTIC ROOT RATIO: 1.7
ECHO LA VOL MOD A4C: 114 ML (ref 22–52)
ECHO LA VOLUME INDEX MOD A4C: 61 ML/M2 (ref 16–34)
ECHO LV E' LATERAL VELOCITY: 9 CM/S
ECHO LV E' SEPTAL VELOCITY: 5 CM/S
ECHO LV FRACTIONAL SHORTENING: 6 % (ref 28–44)
ECHO LV INTERNAL DIMENSION DIASTOLE INDEX: 2.58 CM/M2
ECHO LV INTERNAL DIMENSION DIASTOLIC: 4.8 CM (ref 3.9–5.3)
ECHO LV INTERNAL DIMENSION SYSTOLIC INDEX: 2.42 CM/M2
ECHO LV INTERNAL DIMENSION SYSTOLIC: 4.5 CM
ECHO LV IVSD: 0.7 CM (ref 0.6–0.9)
ECHO LV MASS 2D: 126.7 G (ref 67–162)
ECHO LV MASS INDEX 2D: 68.1 G/M2 (ref 43–95)
ECHO LV POSTERIOR WALL DIASTOLIC: 0.9 CM (ref 0.6–0.9)
ECHO LV RELATIVE WALL THICKNESS RATIO: 0.38
ECHO MV E DECELERATION TIME (DT): 130 MS
ECHO MV E VELOCITY: 1.16 M/S
ECHO MV E/E' LATERAL: 12.89
ECHO MV E/E' RATIO (AVERAGED): 18.04
ECHO RIGHT VENTRICULAR SYSTOLIC PRESSURE (RVSP): 67 MMHG
ECHO TV REGURGITANT MAX VELOCITY: 3.41 M/S
ECHO TV REGURGITANT PEAK GRADIENT: 47 MMHG
EKG ATRIAL RATE: 381 BPM
EKG Q-T INTERVAL: 350 MS
EKG QRS DURATION: 90 MS
EKG QTC CALCULATION (BAZETT): 476 MS
EKG R AXIS: 59 DEGREES
EKG T AXIS: -131 DEGREES
EKG VENTRICULAR RATE: 111 BPM
EOSINOPHIL # BLD: 0.09 K/UL (ref 0–0.44)
EOSINOPHILS RELATIVE PERCENT: 2 % (ref 1–4)
ERYTHROCYTE [DISTWIDTH] IN BLOOD BY AUTOMATED COUNT: 14.6 % (ref 11.8–14.4)
GFR SERPL CREATININE-BSD FRML MDRD: 43 ML/MIN/1.73M2
GLUCOSE SERPL-MCNC: 121 MG/DL (ref 70–99)
HCT VFR BLD AUTO: 39.7 % (ref 36.3–47.1)
HGB BLD-MCNC: 12.6 G/DL (ref 11.9–15.1)
IMM GRANULOCYTES # BLD AUTO: 0 K/UL (ref 0–0.3)
IMM GRANULOCYTES NFR BLD: 0 %
LYMPHOCYTES NFR BLD: 0.59 K/UL (ref 1.1–3.7)
LYMPHOCYTES RELATIVE PERCENT: 13 % (ref 24–43)
MAGNESIUM SERPL-MCNC: 1.8 MG/DL (ref 1.6–2.6)
MCH RBC QN AUTO: 31 PG (ref 25.2–33.5)
MCHC RBC AUTO-ENTMCNC: 31.7 G/DL (ref 28.4–34.8)
MCV RBC AUTO: 97.5 FL (ref 82.6–102.9)
MONOCYTES NFR BLD: 0.45 K/UL (ref 0.1–1.2)
MONOCYTES NFR BLD: 10 % (ref 3–12)
MYOGLOBIN SERPL-MCNC: 71 NG/ML (ref 25–58)
MYOGLOBIN SERPL-MCNC: 75 NG/ML (ref 25–58)
MYOGLOBIN SERPL-MCNC: 80 NG/ML (ref 25–58)
NEUTROPHILS NFR BLD: 74 % (ref 36–65)
NEUTS SEG NFR BLD: 3.32 K/UL (ref 1.5–8.1)
NRBC BLD-RTO: 0 PER 100 WBC
PLATELET # BLD AUTO: 159 K/UL (ref 138–453)
PMV BLD AUTO: 10.7 FL (ref 8.1–13.5)
POTASSIUM SERPL-SCNC: 4.3 MMOL/L (ref 3.7–5.3)
RBC # BLD AUTO: 4.07 M/UL (ref 3.95–5.11)
SODIUM SERPL-SCNC: 142 MMOL/L (ref 135–144)
TROPONIN I SERPL HS-MCNC: 17 NG/L (ref 0–14)
TROPONIN I SERPL HS-MCNC: 18 NG/L (ref 0–14)
TROPONIN I SERPL HS-MCNC: 20 NG/L (ref 0–14)
WBC OTHER # BLD: 4.5 K/UL (ref 3.5–11.3)

## 2024-03-08 PROCEDURE — 2060000000 HC ICU INTERMEDIATE R&B

## 2024-03-08 PROCEDURE — 93306 TTE W/DOPPLER COMPLETE: CPT

## 2024-03-08 PROCEDURE — 2500000003 HC RX 250 WO HCPCS: Performed by: PHYSICIAN ASSISTANT

## 2024-03-08 PROCEDURE — 83874 ASSAY OF MYOGLOBIN: CPT

## 2024-03-08 PROCEDURE — 83880 ASSAY OF NATRIURETIC PEPTIDE: CPT

## 2024-03-08 PROCEDURE — 80048 BASIC METABOLIC PNL TOTAL CA: CPT

## 2024-03-08 PROCEDURE — 85025 COMPLETE CBC W/AUTO DIFF WBC: CPT

## 2024-03-08 PROCEDURE — 84484 ASSAY OF TROPONIN QUANT: CPT

## 2024-03-08 PROCEDURE — 96374 THER/PROPH/DIAG INJ IV PUSH: CPT

## 2024-03-08 PROCEDURE — 6370000000 HC RX 637 (ALT 250 FOR IP): Performed by: INTERNAL MEDICINE

## 2024-03-08 PROCEDURE — 2580000003 HC RX 258: Performed by: PHYSICIAN ASSISTANT

## 2024-03-08 PROCEDURE — 6360000002 HC RX W HCPCS: Performed by: PHYSICIAN ASSISTANT

## 2024-03-08 PROCEDURE — 36415 COLL VENOUS BLD VENIPUNCTURE: CPT

## 2024-03-08 PROCEDURE — 96375 TX/PRO/DX INJ NEW DRUG ADDON: CPT

## 2024-03-08 PROCEDURE — 93005 ELECTROCARDIOGRAM TRACING: CPT | Performed by: EMERGENCY MEDICINE

## 2024-03-08 PROCEDURE — 71045 X-RAY EXAM CHEST 1 VIEW: CPT

## 2024-03-08 PROCEDURE — 99285 EMERGENCY DEPT VISIT HI MDM: CPT

## 2024-03-08 PROCEDURE — 83735 ASSAY OF MAGNESIUM: CPT

## 2024-03-08 PROCEDURE — 6360000002 HC RX W HCPCS: Performed by: INTERNAL MEDICINE

## 2024-03-08 RX ORDER — FUROSEMIDE 10 MG/ML
40 INJECTION INTRAMUSCULAR; INTRAVENOUS 2 TIMES DAILY
Status: DISCONTINUED | OUTPATIENT
Start: 2024-03-08 | End: 2024-03-10

## 2024-03-08 RX ORDER — POTASSIUM CHLORIDE 7.45 MG/ML
10 INJECTION INTRAVENOUS PRN
Status: DISCONTINUED | OUTPATIENT
Start: 2024-03-08 | End: 2024-03-12 | Stop reason: HOSPADM

## 2024-03-08 RX ORDER — ACETAMINOPHEN 325 MG/1
650 TABLET ORAL EVERY 6 HOURS PRN
Status: DISCONTINUED | OUTPATIENT
Start: 2024-03-08 | End: 2024-03-12 | Stop reason: HOSPADM

## 2024-03-08 RX ORDER — SODIUM CHLORIDE 0.9 % (FLUSH) 0.9 %
10 SYRINGE (ML) INJECTION PRN
Status: DISCONTINUED | OUTPATIENT
Start: 2024-03-08 | End: 2024-03-12 | Stop reason: HOSPADM

## 2024-03-08 RX ORDER — BUMETANIDE 0.25 MG/ML
2 INJECTION INTRAMUSCULAR; INTRAVENOUS ONCE
Status: COMPLETED | OUTPATIENT
Start: 2024-03-08 | End: 2024-03-08

## 2024-03-08 RX ORDER — FUROSEMIDE 10 MG/ML
40 INJECTION INTRAMUSCULAR; INTRAVENOUS DAILY
Status: DISCONTINUED | OUTPATIENT
Start: 2024-03-09 | End: 2024-03-08

## 2024-03-08 RX ORDER — VALSARTAN 40 MG/1
40 TABLET ORAL DAILY
Status: DISCONTINUED | OUTPATIENT
Start: 2024-03-08 | End: 2024-03-12 | Stop reason: HOSPADM

## 2024-03-08 RX ORDER — SODIUM CHLORIDE 0.9 % (FLUSH) 0.9 %
10 SYRINGE (ML) INJECTION EVERY 12 HOURS SCHEDULED
Status: DISCONTINUED | OUTPATIENT
Start: 2024-03-08 | End: 2024-03-12 | Stop reason: HOSPADM

## 2024-03-08 RX ORDER — METOPROLOL SUCCINATE 50 MG/1
100 TABLET, EXTENDED RELEASE ORAL DAILY
Status: DISCONTINUED | OUTPATIENT
Start: 2024-03-09 | End: 2024-03-08

## 2024-03-08 RX ORDER — SODIUM CHLORIDE 9 MG/ML
INJECTION, SOLUTION INTRAVENOUS CONTINUOUS
Status: DISCONTINUED | OUTPATIENT
Start: 2024-03-08 | End: 2024-03-12 | Stop reason: HOSPADM

## 2024-03-08 RX ORDER — POTASSIUM CHLORIDE 20 MEQ/1
40 TABLET, EXTENDED RELEASE ORAL PRN
Status: DISCONTINUED | OUTPATIENT
Start: 2024-03-08 | End: 2024-03-12 | Stop reason: HOSPADM

## 2024-03-08 RX ORDER — METOPROLOL SUCCINATE 50 MG/1
100 TABLET, EXTENDED RELEASE ORAL 2 TIMES DAILY
Status: DISCONTINUED | OUTPATIENT
Start: 2024-03-08 | End: 2024-03-12 | Stop reason: HOSPADM

## 2024-03-08 RX ORDER — SPIRONOLACTONE 25 MG/1
25 TABLET ORAL DAILY
Status: DISCONTINUED | OUTPATIENT
Start: 2024-03-08 | End: 2024-03-08

## 2024-03-08 RX ORDER — SENNOSIDES A AND B 8.6 MG/1
1 TABLET, FILM COATED ORAL 2 TIMES DAILY PRN
Status: DISCONTINUED | OUTPATIENT
Start: 2024-03-08 | End: 2024-03-12 | Stop reason: HOSPADM

## 2024-03-08 RX ORDER — SODIUM CHLORIDE 9 MG/ML
INJECTION, SOLUTION INTRAVENOUS PRN
Status: DISCONTINUED | OUTPATIENT
Start: 2024-03-08 | End: 2024-03-12 | Stop reason: HOSPADM

## 2024-03-08 RX ORDER — METOPROLOL SUCCINATE 25 MG/1
25 TABLET, EXTENDED RELEASE ORAL DAILY
Status: DISCONTINUED | OUTPATIENT
Start: 2024-03-08 | End: 2024-03-08

## 2024-03-08 RX ORDER — ONDANSETRON 4 MG/1
4 TABLET, ORALLY DISINTEGRATING ORAL EVERY 8 HOURS PRN
Status: DISCONTINUED | OUTPATIENT
Start: 2024-03-08 | End: 2024-03-12 | Stop reason: HOSPADM

## 2024-03-08 RX ORDER — ONDANSETRON 2 MG/ML
4 INJECTION INTRAMUSCULAR; INTRAVENOUS EVERY 6 HOURS PRN
Status: DISCONTINUED | OUTPATIENT
Start: 2024-03-08 | End: 2024-03-12 | Stop reason: HOSPADM

## 2024-03-08 RX ORDER — DILTIAZEM HYDROCHLORIDE 5 MG/ML
5 INJECTION INTRAVENOUS ONCE
Status: COMPLETED | OUTPATIENT
Start: 2024-03-08 | End: 2024-03-08

## 2024-03-08 RX ORDER — MAGNESIUM SULFATE 1 G/100ML
1000 INJECTION INTRAVENOUS PRN
Status: DISCONTINUED | OUTPATIENT
Start: 2024-03-08 | End: 2024-03-12 | Stop reason: HOSPADM

## 2024-03-08 RX ORDER — ACETAMINOPHEN 650 MG/1
650 SUPPOSITORY RECTAL EVERY 6 HOURS PRN
Status: DISCONTINUED | OUTPATIENT
Start: 2024-03-08 | End: 2024-03-12 | Stop reason: HOSPADM

## 2024-03-08 RX ORDER — BUMETANIDE 0.25 MG/ML
1 INJECTION INTRAMUSCULAR; INTRAVENOUS ONCE
Status: DISCONTINUED | OUTPATIENT
Start: 2024-03-08 | End: 2024-03-08

## 2024-03-08 RX ADMIN — METOPROLOL SUCCINATE 100 MG: 50 TABLET, FILM COATED, EXTENDED RELEASE ORAL at 20:13

## 2024-03-08 RX ADMIN — BUMETANIDE 2 MG: 0.25 INJECTION INTRAMUSCULAR; INTRAVENOUS at 11:18

## 2024-03-08 RX ADMIN — FUROSEMIDE 40 MG: 10 INJECTION, SOLUTION INTRAMUSCULAR; INTRAVENOUS at 20:12

## 2024-03-08 RX ADMIN — DILTIAZEM HYDROCHLORIDE 10 MG/HR: 5 INJECTION, SOLUTION INTRAVENOUS at 21:38

## 2024-03-08 RX ADMIN — DILTIAZEM HYDROCHLORIDE 5 MG: 5 INJECTION, SOLUTION INTRAVENOUS at 10:46

## 2024-03-08 RX ADMIN — DILTIAZEM HYDROCHLORIDE 2.5 MG/HR: 5 INJECTION, SOLUTION INTRAVENOUS at 11:11

## 2024-03-08 ASSESSMENT — PAIN SCALES - GENERAL: PAINLEVEL_OUTOF10: 0

## 2024-03-08 NOTE — H&P
3/8/2024 at 12:20 PM     Copy sent to Franci Peraza MD    This note was created with the assistance of a speech-recognition program.  Although the intention is to generate a document that actually reflects the content of the visit, no guarantees can be provided that every mistake has been identified and corrected by editing.     Note was updated later by me after  physical examination and  completion of the assessment.

## 2024-03-08 NOTE — ED PROVIDER NOTES
[facility]  eMERGENCY dEPARTMENTeNCOUnter      Pt Name: Theodora Webb  MRN: [id number]  Birthdate 1950  Date ofevaluation: 3/8/2024  Provider: Demario Bonner PA-C    CHIEF COMPLAINT       Chief Complaint   Patient presents with    Irregular Heart Beat         HISTORY OF PRESENT ILLNESS  (Location/Symptom, Timing/Onset, Context/Setting, Quality, Duration, Modifying Factors, Severity.)   Theodora Webb is a 74 y.o. female who presents to the emergency department with irregular heartbeat.  Patient reports shortness of breath.  Reports previously being on a diuretic was taken off the diuretic by kidney doctor a month or 2 ago.  Seen by a kidney doctor again a couple weeks ago and was told everything was okay.  States that roughly a month ago she she went back into atrial fibrillation.  Reports some dyspnea on exertion over the last few weeks.  Denies any chest pain at this point in time.  No exertional chest pain.      Nursing Notes were reviewed.    ALLERGIES     Patient has no known allergies.    CURRENT MEDICATIONS       Current Discharge Medication List        CONTINUE these medications which have NOT CHANGED    Details   rivaroxaban (XARELTO) 20 MG TABS tablet Take 1 tablet by mouth Daily with supper      ondansetron (ZOFRAN-ODT) 4 MG disintegrating tablet Take 1 tablet by mouth 3 times daily as needed for Nausea or Vomiting  Qty: 21 tablet, Refills: 0      spironolactone (ALDACTONE) 25 MG tablet Take 1 tablet by mouth daily      valsartan (DIOVAN) 40 MG tablet Take 1 tablet by mouth daily      amiodarone (CORDARONE) 200 MG tablet Take 1 tablet by mouth daily  Qty: 30 tablet, Refills: 0      metoprolol succinate (TOPROL XL) 25 MG extended release tablet Take 4 tablets by mouth daily      vitamin D (CHOLECALCIFEROL) 25 MCG (1000 UT) TABS tablet Take 1 tablet by mouth daily      Cyanocobalamin (VITAMIN B12 PO) Take 1 tablet by mouth daily      Multiple Vitamins-Minerals (THERAPEUTIC

## 2024-03-08 NOTE — SIGNIFICANT EVENT
The patient is a known patient to Dr. Wilson and saw him this week in the office I spoke with the ER and asked them to change the consult to him.  Thank you.    NITESH Thornton - CNP

## 2024-03-08 NOTE — ED PROVIDER NOTES
eMERGENCY dEPARTMENT eNCOUnter   Independent Attestation     Pt Name: Theodora Webb  MRN: [id number]  Birthdate 1950  Date of evaluation: 3/8/24     Theodora Webb is a 74 y.o. female with CC: Irregular Heart Beat      Based on the medical record the care appears appropriate.  I was personally available for consultation in the Emergency Department.    Bay Schwartz MD  Attending Emergency Physician                  Bay Schwartz MD  03/08/24 9342

## 2024-03-08 NOTE — ED NOTES
Pt to ED from home with c/o a-fib for a few days. Pt reports hx of a-fib, states doctor took her off of her meds d/t \"potential side effects, none of which I had\". Pt states she has had increasing difficulties with breathing and rate control since being taken off of her medication. Pt denies chest pain at this time, Pt is resting on ED stretcher, connected to cardiac monitor, EKG done, call light within reach, sister at bedside.

## 2024-03-08 NOTE — ACP (ADVANCE CARE PLANNING)
minutes:      Conversation Outcomes:  ACP discussion completed    Follow-up plan:    [] Schedule follow-up conversation to continue planning  [] Referred individual to Provider for additional questions/concerns   [] Advised patient/agent/surrogate to review completed ACP document and update if needed with changes in condition, patient preferences or care setting    [] This note routed to one or more involved healthcare providers

## 2024-03-09 ENCOUNTER — APPOINTMENT (OUTPATIENT)
Dept: CT IMAGING | Age: 74
DRG: 308 | End: 2024-03-09
Payer: MEDICARE

## 2024-03-09 LAB
ANION GAP SERPL CALCULATED.3IONS-SCNC: 10 MMOL/L (ref 9–17)
BASOPHILS # BLD: 0.03 K/UL (ref 0–0.2)
BASOPHILS NFR BLD: 1 % (ref 0–2)
BNP SERPL-MCNC: 5123 PG/ML
BUN SERPL-MCNC: 40 MG/DL (ref 8–23)
BUN/CREAT SERPL: 27 (ref 9–20)
CALCIUM SERPL-MCNC: 9.4 MG/DL (ref 8.6–10.4)
CHLORIDE SERPL-SCNC: 106 MMOL/L (ref 98–107)
CO2 SERPL-SCNC: 28 MMOL/L (ref 20–31)
CREAT SERPL-MCNC: 1.5 MG/DL (ref 0.5–0.9)
EOSINOPHIL # BLD: 0.12 K/UL (ref 0–0.44)
EOSINOPHILS RELATIVE PERCENT: 3 % (ref 1–4)
ERYTHROCYTE [DISTWIDTH] IN BLOOD BY AUTOMATED COUNT: 14.5 % (ref 11.8–14.4)
GFR SERPL CREATININE-BSD FRML MDRD: 36 ML/MIN/1.73M2
GLUCOSE SERPL-MCNC: 110 MG/DL (ref 70–99)
HCT VFR BLD AUTO: 39.5 % (ref 36.3–47.1)
HGB BLD-MCNC: 12.7 G/DL (ref 11.9–15.1)
IMM GRANULOCYTES # BLD AUTO: 0.01 K/UL (ref 0–0.3)
IMM GRANULOCYTES NFR BLD: 0 %
LYMPHOCYTES NFR BLD: 0.7 K/UL (ref 1.1–3.7)
LYMPHOCYTES RELATIVE PERCENT: 17 % (ref 24–43)
MCH RBC QN AUTO: 31.1 PG (ref 25.2–33.5)
MCHC RBC AUTO-ENTMCNC: 32.2 G/DL (ref 28.4–34.8)
MCV RBC AUTO: 96.6 FL (ref 82.6–102.9)
MONOCYTES NFR BLD: 0.59 K/UL (ref 0.1–1.2)
MONOCYTES NFR BLD: 15 % (ref 3–12)
NEUTROPHILS NFR BLD: 64 % (ref 36–65)
NEUTS SEG NFR BLD: 2.6 K/UL (ref 1.5–8.1)
NRBC BLD-RTO: 0 PER 100 WBC
PLATELET # BLD AUTO: 140 K/UL (ref 138–453)
PMV BLD AUTO: 10.6 FL (ref 8.1–13.5)
POTASSIUM SERPL-SCNC: 4.2 MMOL/L (ref 3.7–5.3)
RBC # BLD AUTO: 4.09 M/UL (ref 3.95–5.11)
RBC # BLD: ABNORMAL 10*6/UL
SODIUM SERPL-SCNC: 144 MMOL/L (ref 135–144)
WBC OTHER # BLD: 4.1 K/UL (ref 3.5–11.3)

## 2024-03-09 PROCEDURE — 36415 COLL VENOUS BLD VENIPUNCTURE: CPT

## 2024-03-09 PROCEDURE — 83880 ASSAY OF NATRIURETIC PEPTIDE: CPT

## 2024-03-09 PROCEDURE — 2580000003 HC RX 258: Performed by: HOSPITALIST

## 2024-03-09 PROCEDURE — 6360000002 HC RX W HCPCS: Performed by: INTERNAL MEDICINE

## 2024-03-09 PROCEDURE — 80048 BASIC METABOLIC PNL TOTAL CA: CPT

## 2024-03-09 PROCEDURE — 2060000000 HC ICU INTERMEDIATE R&B

## 2024-03-09 PROCEDURE — 85025 COMPLETE CBC W/AUTO DIFF WBC: CPT

## 2024-03-09 PROCEDURE — 6370000000 HC RX 637 (ALT 250 FOR IP): Performed by: FAMILY MEDICINE

## 2024-03-09 PROCEDURE — 6370000000 HC RX 637 (ALT 250 FOR IP): Performed by: HOSPITALIST

## 2024-03-09 PROCEDURE — 71250 CT THORAX DX C-: CPT

## 2024-03-09 PROCEDURE — 6370000000 HC RX 637 (ALT 250 FOR IP): Performed by: INTERNAL MEDICINE

## 2024-03-09 PROCEDURE — 94761 N-INVAS EAR/PLS OXIMETRY MLT: CPT

## 2024-03-09 RX ORDER — METOPROLOL TARTRATE 1 MG/ML
5 INJECTION, SOLUTION INTRAVENOUS EVERY 6 HOURS PRN
Status: DISCONTINUED | OUTPATIENT
Start: 2024-03-09 | End: 2024-03-12 | Stop reason: HOSPADM

## 2024-03-09 RX ADMIN — METOPROLOL SUCCINATE 100 MG: 50 TABLET, FILM COATED, EXTENDED RELEASE ORAL at 19:48

## 2024-03-09 RX ADMIN — METOPROLOL SUCCINATE 100 MG: 50 TABLET, FILM COATED, EXTENDED RELEASE ORAL at 08:35

## 2024-03-09 RX ADMIN — FUROSEMIDE 40 MG: 10 INJECTION, SOLUTION INTRAMUSCULAR; INTRAVENOUS at 18:57

## 2024-03-09 RX ADMIN — RIVAROXABAN 15 MG: 15 TABLET, FILM COATED ORAL at 17:59

## 2024-03-09 RX ADMIN — SODIUM CHLORIDE, PRESERVATIVE FREE 10 ML: 5 INJECTION INTRAVENOUS at 19:48

## 2024-03-09 RX ADMIN — FUROSEMIDE 40 MG: 10 INJECTION, SOLUTION INTRAMUSCULAR; INTRAVENOUS at 10:04

## 2024-03-09 RX ADMIN — VALSARTAN 40 MG: 40 TABLET, FILM COATED ORAL at 08:35

## 2024-03-09 RX ADMIN — SODIUM CHLORIDE, PRESERVATIVE FREE 10 ML: 5 INJECTION INTRAVENOUS at 08:37

## 2024-03-09 ASSESSMENT — PAIN SCALES - GENERAL
PAINLEVEL_OUTOF10: 0

## 2024-03-09 NOTE — CONSULTS
Pulmonary Medicine and Critical Care Consult    Patient - Theodora Webb   MRN -  [id number]   Acct # - [id number]   - 1950      Date of Admission -  3/8/2024  9:17 AM  Date of evaluation -  3/9/2024  Room -    Hospital Day - 1  Consulting - Frank Woo MD Primary Care Physician - Franci Richardson MD     Reason for Consult      Possible amiodarone toxicity  Assessment/recommendations   Shortness of breath likely related to A-fib exacerbation and pulmonary edema doubt amiodarone toxicity   A-fib control per cardiology  Diuresis Lasix 40 IV every 12 hours  On Xarelto    basilar lung mild fibrosis/groundglass opacities; chronic according to patient  Do CT chest without contrast  Will review CT scan from [facility]      Systolic heart failure exacerbation/moderate MR/secondary pulmonary hypertension   Follow-up echocardiogram per cardiology  Consider right heart cath        history of asthmatic bronchitis   Xopenex    obstructive sleep apnea  Outpatient sleep evaluation    history of DVT  On Xarelto    CKD   Diuresis per nephrology monitor creatinine    discussed with sister at bedside  Texted Dr. Llanos no answer  Discussed with RN  Follow-up with Dr. 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 
Consult Note    Reason for Consult: Renal dysfunction    Requesting Physician:  Frank Woo MD    HISTORY OF PRESENT ILLNESS:    The patient is a 74 y.o. female who presents with shortness of breath and chest discomfort she was found to have atrial fibrillation with rapid ventricular response.  She states she had the problem before was placed on amiodarone which she will control the rhythm but she was taking of that medicine due to some concern about side effects.    Patient sees Dr. Garcia in the office for CKD.  She said he took her off spironolactone recently but she is not sure why.  She carries diagnosis of CKD stage III.    Review Of Systems:    All other ROS is negative.      Past Medical History:   Diagnosis Date    Arthritis     RA    Asthma     Denies.  Does not have inhalers    Atrial fibrillation (HCC)     not recurrent    Hyperlipidemia     diet controlled    Hypertension     Mitral valve disorder     Moderate to severe pulmonary hypertension (HCC) 05/09/2018    Osteoarthritis     Sleep apnea     not on cpap    Spinal stenosis at L4-L5 level     Stage 3b chronic kidney disease (HCC) 2020    Tachycardia     Tricuspid regurgitation     mild       Past Surgical History:   Procedure Laterality Date    CARDIAC CATHETERIZATION  05/19/2014    CARDIAC CATHETERIZATION  01/20/2016    no stents    CARDIAC SURGERY  11/01/2021    ablation for a-fib    COLONOSCOPY      ENDOSCOPY, COLON, DIAGNOSTIC      JOINT REPLACEMENT Right     total knee    JOINT REPLACEMENT Left     total knee    KNEE JOINT MANIPULATION Right     SHOULDER SURGERY Left 2/16/2023    LEFT SHOULDER TOTAL ARTHROPLASTY REVERSE WITH SUPRASCAPULAR NERVE BLOCK TOURNIER performed by Favian Morgan MD at [facility] OR    SPINE SURGERY  07/2017    lumbar fusion       Prior to Admission medications    Medication Sig Start Date End Date Taking? Authorizing Provider   rivaroxaban (XARELTO) 20 MG TABS tablet Take 1 tablet by mouth Daily with supper   Yes 
atrial fibrillation.  The heart rate is currently controlled and she is appropriately anticoagulated.    2.  Acute systolic congestive heart failure.  I suspect this may be secondary to a tachycardia mediated cardiomyopathy.    3.  Mitral valve regurgitation    4.  Tricuspid valve regurgitation.    5.  Pulmonary hypertension.    6.  Abnormal pulmonary function testing.  The patient had previously seen Dr. Selby.    Recommendations  1.  Will increase dose of metoprolol succinate to improve heart rate control    2.  Will continue diltiazem for now; however, given reduced ejection fraction ideally would like to be able to wean the medication off as a calcium channel blocker is not the ideal choice    3.  Will increase dose of furosemide to 40 mg IV twice daily.    4.  Ultimately the patient will benefit from the initiation of therapy with agents such as spironolactone, empagliflozin and sacubitril-valsartan.  Notably she is already on valsartan, so this will be a relatively easy transition to sacubitril-valsartan.  The timing of these medication change will be dependent upon her renal function and hemodynamic response.    5.  Ultimately the patient would likely benefit from pulmonary consultation given her prior abnormal PFTs    6.  The patient ultimately will also require careful evaluation by electrophysiology to determine the best course of action.  Clearly given her reduced ejection fraction several antiarrhythmic agents are contraindicated currently.  The fact that she has previously had a reduced diffusing capacity makes amiodarone less than ideal.  One consideration would be electrical cardioversion.  Additionally consideration may be given to repeat ablation.  I explained to the patient that each of these options have various risks and benefits that will need to be carefully considered.

## 2024-03-10 LAB
ANION GAP SERPL CALCULATED.3IONS-SCNC: 15 MMOL/L (ref 9–17)
BASOPHILS # BLD: 0.03 K/UL (ref 0–0.2)
BASOPHILS NFR BLD: 1 % (ref 0–2)
BNP SERPL-MCNC: 3443 PG/ML
BUN SERPL-MCNC: 53 MG/DL (ref 8–23)
BUN/CREAT SERPL: 29 (ref 9–20)
CALCIUM SERPL-MCNC: 9.3 MG/DL (ref 8.6–10.4)
CHLORIDE SERPL-SCNC: 99 MMOL/L (ref 98–107)
CO2 SERPL-SCNC: 26 MMOL/L (ref 20–31)
CREAT SERPL-MCNC: 1.8 MG/DL (ref 0.5–0.9)
EOSINOPHIL # BLD: 0.16 K/UL (ref 0–0.44)
EOSINOPHILS RELATIVE PERCENT: 3 % (ref 1–4)
ERYTHROCYTE [DISTWIDTH] IN BLOOD BY AUTOMATED COUNT: 14.4 % (ref 11.8–14.4)
GFR SERPL CREATININE-BSD FRML MDRD: 29 ML/MIN/1.73M2
GLUCOSE SERPL-MCNC: 116 MG/DL (ref 70–99)
HCT VFR BLD AUTO: 41.7 % (ref 36.3–47.1)
HGB BLD-MCNC: 13.5 G/DL (ref 11.9–15.1)
IMM GRANULOCYTES # BLD AUTO: 0.02 K/UL (ref 0–0.3)
IMM GRANULOCYTES NFR BLD: 0 %
LYMPHOCYTES NFR BLD: 0.91 K/UL (ref 1.1–3.7)
LYMPHOCYTES RELATIVE PERCENT: 16 % (ref 24–43)
MCH RBC QN AUTO: 31.3 PG (ref 25.2–33.5)
MCHC RBC AUTO-ENTMCNC: 32.4 G/DL (ref 28.4–34.8)
MCV RBC AUTO: 96.5 FL (ref 82.6–102.9)
MONOCYTES NFR BLD: 0.71 K/UL (ref 0.1–1.2)
MONOCYTES NFR BLD: 13 % (ref 3–12)
NEUTROPHILS NFR BLD: 67 % (ref 36–65)
NEUTS SEG NFR BLD: 3.85 K/UL (ref 1.5–8.1)
NRBC BLD-RTO: 0 PER 100 WBC
PLATELET # BLD AUTO: 154 K/UL (ref 138–453)
PMV BLD AUTO: 10.7 FL (ref 8.1–13.5)
POTASSIUM SERPL-SCNC: 4.2 MMOL/L (ref 3.7–5.3)
RBC # BLD AUTO: 4.32 M/UL (ref 3.95–5.11)
SODIUM SERPL-SCNC: 140 MMOL/L (ref 135–144)
WBC OTHER # BLD: 5.7 K/UL (ref 3.5–11.3)

## 2024-03-10 PROCEDURE — 6370000000 HC RX 637 (ALT 250 FOR IP): Performed by: FAMILY MEDICINE

## 2024-03-10 PROCEDURE — 6370000000 HC RX 637 (ALT 250 FOR IP): Performed by: INTERNAL MEDICINE

## 2024-03-10 PROCEDURE — 97530 THERAPEUTIC ACTIVITIES: CPT

## 2024-03-10 PROCEDURE — 2580000003 HC RX 258: Performed by: HOSPITALIST

## 2024-03-10 PROCEDURE — 83880 ASSAY OF NATRIURETIC PEPTIDE: CPT

## 2024-03-10 PROCEDURE — 6370000000 HC RX 637 (ALT 250 FOR IP): Performed by: HOSPITALIST

## 2024-03-10 PROCEDURE — 80048 BASIC METABOLIC PNL TOTAL CA: CPT

## 2024-03-10 PROCEDURE — 36415 COLL VENOUS BLD VENIPUNCTURE: CPT

## 2024-03-10 PROCEDURE — 97116 GAIT TRAINING THERAPY: CPT

## 2024-03-10 PROCEDURE — 2060000000 HC ICU INTERMEDIATE R&B

## 2024-03-10 PROCEDURE — 85025 COMPLETE CBC W/AUTO DIFF WBC: CPT

## 2024-03-10 PROCEDURE — 94761 N-INVAS EAR/PLS OXIMETRY MLT: CPT

## 2024-03-10 PROCEDURE — 97162 PT EVAL MOD COMPLEX 30 MIN: CPT

## 2024-03-10 RX ADMIN — RIVAROXABAN 15 MG: 15 TABLET, FILM COATED ORAL at 17:47

## 2024-03-10 RX ADMIN — METOPROLOL SUCCINATE 100 MG: 50 TABLET, FILM COATED, EXTENDED RELEASE ORAL at 20:38

## 2024-03-10 RX ADMIN — VALSARTAN 40 MG: 40 TABLET, FILM COATED ORAL at 09:38

## 2024-03-10 RX ADMIN — SODIUM CHLORIDE, PRESERVATIVE FREE 10 ML: 5 INJECTION INTRAVENOUS at 20:41

## 2024-03-10 RX ADMIN — SODIUM CHLORIDE, PRESERVATIVE FREE 10 ML: 5 INJECTION INTRAVENOUS at 09:38

## 2024-03-10 RX ADMIN — METOPROLOL SUCCINATE 100 MG: 50 TABLET, FILM COATED, EXTENDED RELEASE ORAL at 09:38

## 2024-03-11 ENCOUNTER — APPOINTMENT (OUTPATIENT)
Age: 74
DRG: 308 | End: 2024-03-11
Attending: INTERNAL MEDICINE
Payer: MEDICARE

## 2024-03-11 ENCOUNTER — APPOINTMENT (OUTPATIENT)
Dept: ULTRASOUND IMAGING | Age: 74
DRG: 308 | End: 2024-03-11
Payer: MEDICARE

## 2024-03-11 LAB
ANION GAP SERPL CALCULATED.3IONS-SCNC: 10 MMOL/L (ref 9–17)
BASOPHILS # BLD: 0.03 K/UL (ref 0–0.2)
BASOPHILS NFR BLD: 1 % (ref 0–2)
BNP SERPL-MCNC: 2563 PG/ML
BUN SERPL-MCNC: 57 MG/DL (ref 8–23)
BUN/CREAT SERPL: 30 (ref 9–20)
CALCIUM SERPL-MCNC: 8.9 MG/DL (ref 8.6–10.4)
CHLORIDE SERPL-SCNC: 102 MMOL/L (ref 98–107)
CO2 SERPL-SCNC: 26 MMOL/L (ref 20–31)
CREAT SERPL-MCNC: 1.9 MG/DL (ref 0.5–0.9)
ECHO BSA: 1.87 M2
ECHO LA DIAMETER INDEX: 2.62 CM/M2
ECHO LA DIAMETER: 4.8 CM
ECHO LV FRACTIONAL SHORTENING: 13 % (ref 28–44)
ECHO LV INTERNAL DIMENSION DIASTOLE INDEX: 2.57 CM/M2
ECHO LV INTERNAL DIMENSION DIASTOLIC: 4.7 CM (ref 3.9–5.3)
ECHO LV INTERNAL DIMENSION SYSTOLIC INDEX: 2.24 CM/M2
ECHO LV INTERNAL DIMENSION SYSTOLIC: 4.1 CM
ECHO LV IVSD: 0.9 CM (ref 0.6–0.9)
ECHO LV MASS 2D: 153.4 G (ref 67–162)
ECHO LV MASS INDEX 2D: 83.8 G/M2 (ref 43–95)
ECHO LV POSTERIOR WALL DIASTOLIC: 1 CM (ref 0.6–0.9)
ECHO LV RELATIVE WALL THICKNESS RATIO: 0.43
EOSINOPHIL # BLD: 0.14 K/UL (ref 0–0.44)
EOSINOPHILS RELATIVE PERCENT: 3 % (ref 1–4)
ERYTHROCYTE [DISTWIDTH] IN BLOOD BY AUTOMATED COUNT: 14.4 % (ref 11.8–14.4)
GFR SERPL CREATININE-BSD FRML MDRD: 27 ML/MIN/1.73M2
GLUCOSE SERPL-MCNC: 105 MG/DL (ref 70–99)
HCT VFR BLD AUTO: 41.1 % (ref 36.3–47.1)
HGB BLD-MCNC: 13.3 G/DL (ref 11.9–15.1)
IMM GRANULOCYTES # BLD AUTO: 0.02 K/UL (ref 0–0.3)
IMM GRANULOCYTES NFR BLD: 0 %
LYMPHOCYTES NFR BLD: 0.97 K/UL (ref 1.1–3.7)
LYMPHOCYTES RELATIVE PERCENT: 18 % (ref 24–43)
MCH RBC QN AUTO: 31.2 PG (ref 25.2–33.5)
MCHC RBC AUTO-ENTMCNC: 32.4 G/DL (ref 28.4–34.8)
MCV RBC AUTO: 96.5 FL (ref 82.6–102.9)
MONOCYTES NFR BLD: 0.67 K/UL (ref 0.1–1.2)
MONOCYTES NFR BLD: 12 % (ref 3–12)
NEUTROPHILS NFR BLD: 66 % (ref 36–65)
NEUTS SEG NFR BLD: 3.6 K/UL (ref 1.5–8.1)
NRBC BLD-RTO: 0 PER 100 WBC
PLATELET # BLD AUTO: 148 K/UL (ref 138–453)
PMV BLD AUTO: 10.9 FL (ref 8.1–13.5)
POTASSIUM SERPL-SCNC: 4.3 MMOL/L (ref 3.7–5.3)
RBC # BLD AUTO: 4.26 M/UL (ref 3.95–5.11)
SODIUM SERPL-SCNC: 138 MMOL/L (ref 135–144)
WBC OTHER # BLD: 5.4 K/UL (ref 3.5–11.3)

## 2024-03-11 PROCEDURE — 85025 COMPLETE CBC W/AUTO DIFF WBC: CPT

## 2024-03-11 PROCEDURE — 2060000000 HC ICU INTERMEDIATE R&B

## 2024-03-11 PROCEDURE — 2580000003 HC RX 258: Performed by: HOSPITALIST

## 2024-03-11 PROCEDURE — 76770 US EXAM ABDO BACK WALL COMP: CPT

## 2024-03-11 PROCEDURE — 6370000000 HC RX 637 (ALT 250 FOR IP): Performed by: HOSPITALIST

## 2024-03-11 PROCEDURE — 36415 COLL VENOUS BLD VENIPUNCTURE: CPT

## 2024-03-11 PROCEDURE — 6370000000 HC RX 637 (ALT 250 FOR IP): Performed by: FAMILY MEDICINE

## 2024-03-11 PROCEDURE — 80048 BASIC METABOLIC PNL TOTAL CA: CPT

## 2024-03-11 PROCEDURE — 6370000000 HC RX 637 (ALT 250 FOR IP): Performed by: INTERNAL MEDICINE

## 2024-03-11 PROCEDURE — 83880 ASSAY OF NATRIURETIC PEPTIDE: CPT

## 2024-03-11 PROCEDURE — 94761 N-INVAS EAR/PLS OXIMETRY MLT: CPT

## 2024-03-11 PROCEDURE — 93308 TTE F-UP OR LMTD: CPT

## 2024-03-11 RX ADMIN — METOPROLOL SUCCINATE 100 MG: 50 TABLET, FILM COATED, EXTENDED RELEASE ORAL at 19:50

## 2024-03-11 RX ADMIN — METOPROLOL SUCCINATE 100 MG: 50 TABLET, FILM COATED, EXTENDED RELEASE ORAL at 09:51

## 2024-03-11 RX ADMIN — VALSARTAN 40 MG: 40 TABLET, FILM COATED ORAL at 09:51

## 2024-03-11 RX ADMIN — SODIUM CHLORIDE, PRESERVATIVE FREE 10 ML: 5 INJECTION INTRAVENOUS at 09:52

## 2024-03-11 RX ADMIN — RIVAROXABAN 15 MG: 15 TABLET, FILM COATED ORAL at 17:45

## 2024-03-11 RX ADMIN — SODIUM CHLORIDE: 9 INJECTION, SOLUTION INTRAVENOUS at 11:41

## 2024-03-11 NOTE — CARE COORDINATION
Discharge planning    Plan is to transfer patient to [facility] for cardioversion. Patient is independent. Declines any skilled needs.   
IMM letter provided to patient.  Patient offered four hours to make informed decision regarding appeal process; patient agreeable to discharge.   
Family    Financial    Payor: MEDICARE / Plan: MEDICARE PART A AND B / Product Type: *No Product type* /     Does insurance require precert for SNF: No    Potential assistance Purchasing Medications: No  Meds-to-Beds request:        [id number] - [place], [place] - [address] - P [phone] - F [phone] [address]  [place] [address]  Phone: [phone] Fax: [phone]    [facility] [id number] [place], [place] - [address] [facility] [address] [facility] [phone] - F [phone] [address] [facility] [facility] [address]  Phone: [phone] Fax: [phone]      Notes:    Factors facilitating achievement of predicted outcomes: Family support, Cooperative, and Pleasant    Barriers to discharge: Medical complications    Additional Case Management Notes: Patient lives at home alone. States that she is independent and able to ambulate. Reports no DME at home and does not need any. Patient is on Xarelto as a home med. Cardiology consult. Reports no needs. Plan is home independently.     The Plan for Transition of Care is related to the following treatment goals of A-fib (HCC) [I48.91]    IF APPLICABLE: The Patient and/or patient representative Theodora and her family were provided with a choice of provider and agrees with the discharge plan. Freedom of choice list with basic dialogue that supports the patient's individualized plan of care/goals and shares the quality data associated with the providers was provided to:     Patient Representative Name:       The Patient and/or Patient Representative Agree with the Discharge Plan?      FRANCA Hamilton  Case Management Department  Ph:  Fax:

## 2024-03-11 NOTE — FLOWSHEET NOTE
Called [facility] cardiology NP to inquire if cardiology will be the attending when patient is transferred to [facility] or hospitalist.  Awaiting call back currently

## 2024-03-12 VITALS
DIASTOLIC BLOOD PRESSURE: 85 MMHG | BODY MASS INDEX: 23.36 KG/M2 | TEMPERATURE: 97 F | WEIGHT: 154.1 LBS | SYSTOLIC BLOOD PRESSURE: 101 MMHG | HEIGHT: 68 IN | HEART RATE: 98 BPM | RESPIRATION RATE: 16 BRPM | OXYGEN SATURATION: 93 %

## 2024-03-12 PROCEDURE — 94761 N-INVAS EAR/PLS OXIMETRY MLT: CPT

## 2024-03-12 NOTE — PLAN OF CARE
Problem: Chronic Conditions and Co-morbidities  Goal: Patient's chronic conditions and co-morbidity symptoms are monitored and maintained or improved  3/10/2024 2205 by Jonathan Park RN  Outcome: Progressing  3/10/2024 1759 by Lore Looney RN  Outcome: Progressing     Problem: Discharge Planning  Goal: Discharge to home or other facility with appropriate resources  3/10/2024 2205 by Jonathan Park RN  Outcome: Progressing  3/10/2024 1759 by Lore Looney RN  Outcome: Progressing     Problem: Safety - Adult  Goal: Free from fall injury  3/10/2024 2205 by Jonathan Park RN  Outcome: Progressing  3/10/2024 1759 by Lore Looney RN  Outcome: Progressing     Problem: ABCDS Injury Assessment  Goal: Absence of physical injury  3/10/2024 2205 by Jonathan Park RN  Outcome: Progressing  3/10/2024 1759 by Lore Looney RN  Outcome: Progressing     Problem: Cardiovascular - Adult  Goal: Maintains optimal cardiac output and hemodynamic stability  3/10/2024 2205 by Jonathan Park RN  Outcome: Progressing  Flowsheets (Taken 3/10/2024 2000)  Maintains optimal cardiac output and hemodynamic stability:   Monitor blood pressure and heart rate   Monitor urine output and notify Licensed Independent Practitioner for values outside of normal range   Assess for signs of decreased cardiac output   Administer fluid and/or volume expanders as ordered  3/10/2024 1759 by Lore Looney RN  Outcome: Progressing  Goal: Absence of cardiac dysrhythmias or at baseline  3/10/2024 2205 by Jonathan Park RN  Outcome: Progressing  Flowsheets (Taken 3/10/2024 2000)  Absence of cardiac dysrhythmias or at baseline:   Monitor cardiac rate and rhythm   Assess for signs of decreased cardiac output   Administer antiarrhythmia medication and electrolyte replacement as ordered  3/10/2024 1759 by Lore Looney RN  Outcome: Progressing     Problem: Respiratory - Adult  Goal: Achieves optimal ventilation and oxygenation  3/10/2024 2205 by Xavier 
  Problem: Chronic Conditions and Co-morbidities  Goal: Patient's chronic conditions and co-morbidity symptoms are monitored and maintained or improved  3/9/2024 2036 by Shazia Lala RN  Outcome: Progressing  3/9/2024 1925 by Lore Looney RN  Outcome: Progressing     Problem: Discharge Planning  Goal: Discharge to home or other facility with appropriate resources  3/9/2024 2036 by Shazia Lala RN  Outcome: Progressing  3/9/2024 1925 by Lore Looney RN  Outcome: Progressing     Problem: Safety - Adult  Goal: Free from fall injury  3/9/2024 2036 by Shazia Lala RN  Outcome: Progressing  3/9/2024 1925 by Lore Looney RN  Outcome: Progressing     Problem: ABCDS Injury Assessment  Goal: Absence of physical injury  3/9/2024 2036 by Shazia Lala RN  Outcome: Progressing  3/9/2024 1925 by Lore Looney RN  Outcome: Progressing     Problem: Cardiovascular - Adult  Goal: Maintains optimal cardiac output and hemodynamic stability  3/9/2024 2036 by Shazia Lala RN  Outcome: Progressing  3/9/2024 1925 by Lore Looney RN  Outcome: Progressing  Goal: Absence of cardiac dysrhythmias or at baseline  Outcome: Progressing     Problem: Respiratory - Adult  Goal: Achieves optimal ventilation and oxygenation  3/9/2024 2036 by Shazia Lala RN  Outcome: Progressing  3/9/2024 1925 by Lore Looney RN  Outcome: Progressing     
  Problem: Chronic Conditions and Co-morbidities  Goal: Patient's chronic conditions and co-morbidity symptoms are monitored and maintained or improved  Outcome: Progressing     Problem: Discharge Planning  Goal: Discharge to home or other facility with appropriate resources  Outcome: Progressing     Problem: Safety - Adult  Goal: Free from fall injury  Outcome: Progressing     Problem: ABCDS Injury Assessment  Goal: Absence of physical injury  Outcome: Progressing     Problem: Cardiovascular - Adult  Goal: Maintains optimal cardiac output and hemodynamic stability  Outcome: Progressing     Problem: Respiratory - Adult  Goal: Achieves optimal ventilation and oxygenation  Outcome: Progressing     
  Problem: Chronic Conditions and Co-morbidities  Goal: Patient's chronic conditions and co-morbidity symptoms are monitored and maintained or improved  Outcome: Progressing     Problem: Discharge Planning  Goal: Discharge to home or other facility with appropriate resources  Outcome: Progressing     Problem: Safety - Adult  Goal: Free from fall injury  Outcome: Progressing     Problem: ABCDS Injury Assessment  Goal: Absence of physical injury  Outcome: Progressing     Problem: Cardiovascular - Adult  Goal: Maintains optimal cardiac output and hemodynamic stability  Outcome: Progressing  Goal: Absence of cardiac dysrhythmias or at baseline  Outcome: Progressing     Problem: Respiratory - Adult  Goal: Achieves optimal ventilation and oxygenation  Outcome: Progressing     
  Problem: Chronic Conditions and Co-morbidities  Goal: Patient's chronic conditions and co-morbidity symptoms are monitored and maintained or improved  Outcome: Progressing  Flowsheets   Care Plan - Patient's Chronic Conditions and Co-Morbidity Symptoms are Monitored and Maintained or Improved:   Monitor and assess patient's chronic conditions and comorbid symptoms for stability, deterioration, or improvement   Collaborate with multidisciplinary team to address chronic and comorbid conditions and prevent exacerbation or deterioration   Update acute care plan with appropriate goals if chronic or comorbid symptoms are exacerbated and prevent overall improvement and discharge     Problem: Discharge Planning  Goal: Discharge to home or other facility with appropriate resources  Outcome: Progressing  Flowsheets   Discharge to home or other facility with appropriate resources:   Identify barriers to discharge with patient and caregiver   Arrange for needed discharge resources and transportation as appropriate   Identify discharge learning needs (meds, wound care, etc)     Problem: Safety - Adult  Goal: Free from fall injury  Outcome: Progressing  Flowsheets   Free From Fall Injury: Instruct family/caregiver on patient safety       Problem: ABCDS Injury Assessment  Goal: Absence of physical injury  Outcome: Progressing  Flowsheets   Absence of Physical Injury: Implement safety measures based on patient assessment       Problem: Cardiovascular - Adult  Goal: Maintains optimal cardiac output and hemodynamic stability  Outcome: Progressing  Flowsheets   Maintains optimal cardiac output and hemodynamic stability:   Monitor blood pressure and heart rate   Monitor urine output and notify Licensed Independent Practitioner for values outside of normal range   Assess for signs of decreased cardiac output   Administer fluid and/or volume expanders as ordered   Administer vasoactive medications as ordered  Goal: Absence of cardiac 
  Problem: Chronic Conditions and Co-morbidities  Goal: Patient's chronic conditions and co-morbidity symptoms are monitored and maintained or improved  Outcome: Progressing  Flowsheets (Taken 3/8/2024 1419)  Care Plan - Patient's Chronic Conditions and Co-Morbidity Symptoms are Monitored and Maintained or Improved:   Monitor and assess patient's chronic conditions and comorbid symptoms for stability, deterioration, or improvement   Collaborate with multidisciplinary team to address chronic and comorbid conditions and prevent exacerbation or deterioration     Problem: Discharge Planning  Goal: Discharge to home or other facility with appropriate resources  Outcome: Progressing  Flowsheets (Taken 3/8/2024 1419)  Discharge to home or other facility with appropriate resources:   Identify barriers to discharge with patient and caregiver   Arrange for needed discharge resources and transportation as appropriate     Problem: Safety - Adult  Goal: Free from fall injury  Outcome: Progressing     Problem: ABCDS Injury Assessment  Goal: Absence of physical injury  Outcome: Progressing     Problem: Cardiovascular - Adult  Goal: Maintains optimal cardiac output and hemodynamic stability  Outcome: Progressing  Goal: Absence of cardiac dysrhythmias or at baseline  Outcome: Progressing     Problem: Respiratory - Adult  Goal: Achieves optimal ventilation and oxygenation  Outcome: Progressing     
urine output and notify Licensed Independent Practitioner for values outside of normal range   Assess for signs of decreased cardiac output   Administer fluid and/or volume expanders as ordered   Administer vasoactive medications as ordered     Problem: Cardiovascular - Adult  Goal: Absence of cardiac dysrhythmias or at baseline  3/9/2024 0231 by Leslie Gallagher, RN  Outcome: Progressing  Flowsheets (Taken 3/8/2024 2000)  Absence of cardiac dysrhythmias or at baseline:   Monitor cardiac rate and rhythm   Assess for signs of decreased cardiac output   Administer antiarrhythmia medication and electrolyte replacement as ordered     Problem: Respiratory - Adult  Goal: Achieves optimal ventilation and oxygenation  3/9/2024 0231 by Leslie Gallagher, RN  Outcome: Progressing

## 2024-03-12 NOTE — PROGRESS NOTES
Progress note  [facility], [place]    Adult Hospitalist      Name: Theodora Webb  MRN: [id number]     Acct: [id number]  Room: 2032/2032-01    Admit Date: 3/8/2024  9:17 AM  PCP: Franci Richardson MD    Primary Problem  Principal Problem:    A-fib ([facility])  Resolved Problems:    * No resolved hospital problems. *        Assesment/ plan:       Patient admitted to intermediate level        A-fib with RVR  Patient is on amiodarone and Xarelto.  Reportedly she stated that she was taken off of amiodarone  Heart rate is improved  Trend troponin  Cardiology consult      Acute on chronic systolic CHF  Chest x-ray is consistent with mild CHF/volume overload  IV Lasix  Echocardiogram  Monitor respiratory status        CKD stage III  Monitor creatinine while patient is on diuretics      Hyperlipidemia  Continue statin                  Continue to monitor/telemetry/CBC with differential daily/BMP daily  DVT and GI prophylaxis.  Continue medications as below      Scheduled Meds:   rivaroxaban  15 mg Oral Dinner    valsartan  40 mg Oral Daily    sodium chloride flush  10 mL IntraVENous 2 times per day    furosemide  40 mg IntraVENous BID    metoprolol succinate  100 mg Oral BID     Continuous Infusions:   dilTIAZem 125 mg in sodium chloride 0.9 % 125 mL infusion Stopped (03/09/24 0059)    sodium chloride      sodium chloride       PRN Meds:  metoprolol, 5 mg, Q6H PRN  sodium chloride flush, 10 mL, PRN  sodium chloride, , PRN  potassium chloride, 40 mEq, PRN   Or  potassium alternative oral replacement, 40 mEq, PRN   Or  potassium chloride, 10 mEq, PRN  magnesium sulfate, 1,000 mg, PRN  ondansetron, 4 mg, Q8H PRN   Or  ondansetron, 4 mg, Q6H PRN  senna, 1 tablet, BID PRN  acetaminophen, 650 mg, Q6H PRN   Or  acetaminophen, 650 mg, Q6H PRN      Chief Complaint:     Chief Complaint   Patient presents with    Irregular Heart Beat         History of Present Illness:   Patient seen and examined.  She was walking in the hallway 
[facility] ([facility])    Progress Note    3/10/2024 7:01 AM      Subjective:  Ms. Webb  is resting  HR on tele overall better during rest             LABS:     CBC:   Recent Labs     24  0943 24  0543 03/10/24  0330   WBC 4.5 4.1 5.7   HGB 12.6 12.7 13.5   HCT 39.7 39.5 41.7   MCV 97.5 96.6 96.5    140 154       BMP:   Recent Labs     24  0943 24  0543 03/10/24  0330    144 140   K 4.3 4.2 4.2    106 99   CO2 22 28 26   BUN 34* 40* 53*   CREATININE 1.3* 1.5* 1.8*       PT/INR: No results for input(s): \"PROTIME\", \"INR\" in the last 72 hours.  APTT: No results for input(s): \"APTT\" in the last 72 hours.  MAG:   Recent Labs     24  0943   MG 1.8       D Dimer: No results for input(s): \"DDIMER\" in the last 72 hours.  Troponin T No results for input(s): \"TROPONINT\" in the last 72 hours.  Pro-BNP No results for input(s): \"BNP\" in the last 72 hours.    Pulse Ox: SpO2  Av.6 %  Min: 92 %  Max: 98 %  Supplemental O2:       Current Meds: rivaroxaban (XARELTO) tablet 15 mg, Dinner  metoprolol (LOPRESSOR) injection 5 mg, Q6H PRN  dilTIAZem 125 mg in sodium chloride 0.9 % 125 mL infusion, Continuous  valsartan (DIOVAN) tablet 40 mg, Daily  0.9 % sodium chloride infusion, Continuous  sodium chloride flush 0.9 % injection 10 mL, 2 times per day  sodium chloride flush 0.9 % injection 10 mL, PRN  0.9 % sodium chloride infusion, PRN  potassium chloride (KLOR-CON M) extended release tablet 40 mEq, PRN   Or  potassium bicarb-citric acid (EFFER-K) effervescent tablet 40 mEq, PRN   Or  potassium chloride 10 mEq/100 mL IVPB (Peripheral Line), PRN  magnesium sulfate 1000 mg in dextrose 5% 100 mL IVPB, PRN  ondansetron (ZOFRAN-ODT) disintegrating tablet 4 mg, Q8H PRN   Or  ondansetron (ZOFRAN) injection 4 mg, Q6H PRN  senna (SENOKOT) tablet 8.6 mg, BID PRN  acetaminophen (TYLENOL) tablet 650 mg, Q6H PRN   Or  acetaminophen (TYLENOL) suppository 650 mg, Q6H 
Dr. Goode sees patient on rounds.  Provider states if pulmonology clears patient for taking amiodarone, it can be restarted.  No need to contact Dr. Goode, please ask attending to restart home dose of amiodarone 200 mg po daily.  
End Of Shift Note  [facility]  Summary of shift: CT chest completed to assess if amiodarone can be restarted.  Patient has been up in room and walked 250 ft in dietz with front wheeled walker.     Vitals:    Vitals:    03/09/24 1500 03/09/24 1600 03/09/24 1800 03/09/24 1857   BP:  94/75 93/74 106/83   Pulse: 93 (!) 113 (!) 101    Resp:       Temp:  97.3 °F (36.3 °C)     TempSrc:  Temporal     SpO2: 94% 93% 92%    Weight:       Height:            I&O:   Intake/Output Summary (Last 24 hours) at 3/9/2024 1926  Last data filed at 3/9/2024 1019  Gross per 24 hour   Intake --   Output 350 ml   Net -350 ml       Resp Status: Respirations easy at rest on room air.  Patient states she becomes mildly short of breath with ambulation.     Ventilator Settings:     / / /     Critical Care IV infusions:   dilTIAZem 125 mg in sodium chloride 0.9 % 125 mL infusion Stopped (03/09/24 0059)    sodium chloride      sodium chloride          LDA:   Peripheral IV 03/08/24 Right Antecubital (Active)   Number of days: 1       Incision Shoulder Left (Active)   Number of days:           
End Of Shift Note  [facility]  Summary of shift: Patient has walked multiple laps around unit with front-wheeled walker, up to the chair. Cardiology to make recommendation regarding restarting amiodarone.  Amiodarone may be restarted from pulmonology standpoint. Possible DC tomorrow. Furosemide was discontinued by cardiology.    Vitals:    Vitals:    03/10/24 0938 03/10/24 1148 03/10/24 1149 03/10/24 1551   BP: 98/83 (!) 88/67  (!) 86/72   Pulse: 94 (!) 104  (!) 121   Resp:    16   Temp:  96.8 °F (36 °C)  97.3 °F (36.3 °C)   TempSrc:  Temporal  Temporal   SpO2:   96% 95%   Weight:       Height:            I&O: No intake or output data in the 24 hours ending 03/10/24 9959    Resp Status: respirations easy at rest and with ambulation on room air.    Ventilator Settings:     / / /     Critical Care IV infusions:   dilTIAZem 125 mg in sodium chloride 0.9 % 125 mL infusion Stopped (03/09/24 0059)    sodium chloride      sodium chloride          LDA:   Peripheral IV 03/08/24 Right Antecubital (Active)   Number of days: 2       Incision Shoulder Left (Active)   Number of days:           
End Of Shift Note  [facility]  Summary of shift: Patient still in Afib.  Cardizem drip was stopped at 0059 due to low heart rate, patient's vitals have been steady and WNL. Plan to have consult with nephrology today.  Vitals:    Vitals:    03/08/24 2012 03/09/24 0000 03/09/24 0028 03/09/24 0400   BP: 108/79 (!) 95/55 (!) 95/55 (!) 101/59   Pulse: 80 79 65 73   Resp:  16  18   Temp:  98.1 °F (36.7 °C)  97.7 °F (36.5 °C)   TempSrc:  Temporal  Temporal   SpO2: 96% (!) 85%  95%   Weight:    79 kg (174 lb 1.6 oz)   Height:            I&O:   Intake/Output Summary (Last 24 hours) at 3/9/2024 0522  Last data filed at 3/8/2024 1554  Gross per 24 hour   Intake --   Output 400 ml   Net -400 ml       Resp Status: Room Air    Ventilator Settings:     / / /     Critical Care IV infusions:   dilTIAZem 125 mg in sodium chloride 0.9 % 125 mL infusion Stopped (03/09/24 0059)    sodium chloride      sodium chloride          LDA:   Peripheral IV 03/08/24 Right Antecubital (Active)   Number of days: 0       Incision Shoulder Left (Active)   Number of days:           
End Of Shift Note  [facility]  Summary of shift: Patient still in Afib/flutter, on cardizem drip, Nephro and cardiology consulted. Echo done.    Vitals:    Vitals:    03/08/24 1339 03/08/24 1418 03/08/24 1419 03/08/24 1610   BP:  118/87  100/86   Pulse: 94 88 (!) 101 81   Resp: 16 16  16   Temp:    98 °F (36.7 °C)   TempSrc:    Temporal   SpO2: 95% 93%  98%   Weight:       Height:            I&O:   Intake/Output Summary (Last 24 hours) at 3/8/2024 1749  Last data filed at 3/8/2024 1554  Gross per 24 hour   Intake --   Output 400 ml   Net -400 ml       Resp Status: RA, clear    Ventilator Settings:     / / /     Critical Care IV infusions:   dilTIAZem 125 mg in sodium chloride 0.9 % 125 mL infusion 10 mg/hr (03/08/24 1329)    sodium chloride      sodium chloride          LDA:   Peripheral IV 03/08/24 Right Antecubital (Active)   Number of days: 0       Incision Shoulder Left (Active)   Number of days:          
End Of Shift Note  [facility]  Summary of shift: Pt hemodynamically stable this shift. Continues to keep off cardizem drip. Continues oral metoprolol. Plan for today is to discharge home    Vitals:    Vitals:    03/11/24 0000 03/11/24 0320 03/11/24 0400 03/11/24 0500   BP: (!) 88/63  106/65    Pulse:  94     Resp:       Temp: 97.8 °F (36.6 °C)  97.5 °F (36.4 °C)    TempSrc: Temporal  Temporal    SpO2:  95%     Weight:    69.9 kg (154 lb 1.6 oz)   Height:            I&O: No intake or output data in the 24 hours ending 03/11/24 0642    Resp Status: Room air. Clear lung sounds.    Ventilator Settings:     / / /     Critical Care IV infusions:   dilTIAZem 125 mg in sodium chloride 0.9 % 125 mL infusion Stopped (03/09/24 0059)    sodium chloride      sodium chloride          LDA:   Peripheral IV 03/08/24 Right Antecubital (Active)   Number of days: 2       Incision Shoulder Left (Active)   Number of days:          
Kenia from [employer] ([phone]) called for an update on patient status. Writer informed her of patient vitals, current status and was told  was supposed to be around midnight but that has not happened yet. Kenia says \"patient is being outsourced to [facility] and  should happen around 2 am to 230 am.\"   
Patient belongings footwear, jacket, pants, shirts, socks, undergarments, ring, cell phone, , tablet given to transport. Patient transported to [facility] room B669 via stretcher with [employer].    
Patient transported to room via ED RN on stretcher. Patient placed in bed and put on cardiac monitor. Vital signs obtained. Patient in a A Fib rhythm. Patient updated on plan of care and verbalized understanding. All lines and tubes in tact. Cardizem drip infusing. Will continue to monitor.   
[facility] access called, patient has room/bed accepted.  She will be in rm B669.  RN to call report to [phone].  P/U TBD if [facility] transport needs to outsource, otherwise pickup will be around midnight tonight.   
Pulmonary Critical Care Progress Note    Patient seen for the follow up of [facility]     Subjective:     She has been on room air.  She has no shortness breath or chest pain.  Blood pressures been stable.  Heart rate improved.    Examination:    Vitals: /82   Pulse (!) 106   Temp 97.2 °F (36.2 °C) (Temporal)   Resp 16   Ht 1.727 m (5' 8\")   Wt 69.9 kg (154 lb 1.6 oz)   SpO2 96%   BMI 23.43 kg/m²   SpO2  Av.7 %  Min: 93 %  Max: 96 %  General appearance: alert and cooperative with exam  Neck: No JVD  Lungs:  mild decreased sounds no crackles or wheezing  Heart: irregular rate and rhythm, S1, S2 normal, no gallop  Abdomen: Soft, non tender, + BS  Extremities: no cyanosis or clubbing. No significant edema    LABs:    CBC:   Recent Labs     03/10/24  0330 24  0331   WBC 5.7 5.4   HGB 13.5 13.3   HCT 41.7 41.1    148       BMP:   Recent Labs     03/10/24  0330 24  0331    138   K 4.2 4.3   CO2 26 26   BUN 53* 57*   CREATININE 1.8* 1.9*   LABGLOM 29* 27*   GLUCOSE 116* 105*         Radiology:     CT chest 2024  1. No evidence of interstitial lung disease.Bibasilar scarring versus atelectasis.  2. Moderate coronary artery calcification and moderate cardiomegaly.  3. Stable indeterminate splenic lesion measuring 3.3 cm since 2023.  Recommend follow-up contrast-enhanced abdominal MRI in May of 2024 to further  characterize and stab list 1 year of stability..      CT abdomen pelvis 2023  A few ground-glass opacities are seen, likely  postinflammatory-infectious.        CT chest [facility]         CT chest 2019          PFT 2019 at [facility]    [facility]      Echocardiogram 3/8/24    Left Ventricle: Severely reduced left ventricular systolic function with a visually estimated EF of 30 - 35%. Left ventricle size is normal. Normal wall thickness. moderate  global hypokinesis present. Abnormal diastolic function.    Right Ventricle: Right 
Reason for Follow up: Abnormal renal function    HISTORY:      Patient feels okay denies any chest pain or shortness of breath.  Medical records reviewed      All other ROS is negative.      Scheduled Meds:   rivaroxaban  15 mg Oral Dinner    valsartan  40 mg Oral Daily    sodium chloride flush  10 mL IntraVENous 2 times per day    metoprolol succinate  100 mg Oral BID   Continuous Infusions:   dilTIAZem 125 mg in sodium chloride 0.9 % 125 mL infusion Stopped (03/09/24 0059)    sodium chloride      sodium chloride       PRN Meds:metoprolol, sodium chloride flush, sodium chloride, potassium chloride **OR** potassium alternative oral replacement **OR** potassium chloride, magnesium sulfate, ondansetron **OR** ondansetron, senna, acetaminophen **OR** acetaminophen    No Known Allergies    Physical Exam:  Blood pressure (!) 88/67, pulse (!) 104, temperature 96.8 °F (36 °C), temperature source Temporal, resp. rate 16, height 1.727 m (5' 8\"), weight 78.9 kg (174 lb), SpO2 (!) 76 %.  In: -   Out: 350   In: -   Out: 350 [Urine:350]      General:  Awake, alert, not in distress. Appears to be stated age.  HEENT: Atraumatic, normocephalic. Anicteric sclera.   Chest: Bilateral air entry, clear to auscultation, no wheezing, rhonchi or rales.  Cardiovascular: Irregularly irregular, S1S2, no murmur, rub or gallop. No lower extremity edema.    Abdomen: Soft, non tender to palpation.   Musculoskeletal: Active ROM x 4 extremities. No cyanosis or clubbing.  Integumentary: Pink, warm and dry. Free from rash or lesions. Skin turgor normal.  CNS: Oriented to person, place and time. Speech clear. Face symmetrical. No tremor.     Data:  CBC:   Lab Results   Component Value Date    WBC 5.7 03/10/2024    HGB 13.5 03/10/2024    HCT 41.7 03/10/2024    MCV 96.5 03/10/2024     03/10/2024         CMP:   Lab Results   Component Value Date     03/10/2024    K 4.2 03/10/2024    CL 99 03/10/2024    CO2 26 03/10/2024    BUN 53 (H) 
Transitions of Care Pharmacy Service   Medication Review    The patient's list of current home medications has been reviewed.     Source(s) of information: patient, Care Everywhere, Surescripts refill report      Other Notes Per cardiology note on 3/5, pt was to continue amiodarone 200mg daily and increase Toprol to 100mg daily -- however pt reports that she was instructed to discontinue amiodarone and increase her Toprol to 100mg BID instead.           Please feel free to call me with any questions about this encounter. Thank you.    Colette Jeffers [facility]   Transitions of Care Pharmacy Service  Phone:  [phone]  Fax: [phone]      Electronically signed by Colette Jeffers [facility] on 3/11/2024 at 6:34 PM         Medications Prior to Admission:   rivaroxaban (XARELTO) 20 MG TABS tablet, Take 1 tablet by mouth Daily with supper  valsartan (DIOVAN) 40 MG tablet, Take 1 tablet by mouth daily  metoprolol succinate (TOPROL XL) 50 MG extended release tablet, Take 2 tablets by mouth 2 times daily  Cyanocobalamin (VITAMIN B12 PO), Take 1 tablet by mouth daily  Multiple Vitamins-Minerals (THERAPEUTIC MULTIVITAMIN-MINERALS) tablet, Take 1 tablet by mouth daily                
Trini/Dr Llanos consulted for Afib with RVR. Awaiting response.  
[facility] ([facility]    Progress Note    3/9/2024 11:02 AM      Subjective:  Ms. Webb  is upset about being off amiodarone    We will ask pulmonary to see her regarding restarting amiodarone    Has been diuresing    Is going to walk to see if she feels better            LABS:     CBC:   Recent Labs     24  0943 24  0543   WBC 4.5 4.1   HGB 12.6 12.7   HCT 39.7 39.5   MCV 97.5 96.6    140     BMP:   Recent Labs     24  0943 24  0543    144   K 4.3 4.2    106   CO2 22 28   BUN 34* 40*   CREATININE 1.3* 1.5*     PT/INR: No results for input(s): \"PROTIME\", \"INR\" in the last 72 hours.  APTT: No results for input(s): \"APTT\" in the last 72 hours.  MAG:   Recent Labs     24  0943   MG 1.8     D Dimer: No results for input(s): \"DDIMER\" in the last 72 hours.  Troponin T No results for input(s): \"TROPONINT\" in the last 72 hours.  Pro-BNP No results for input(s): \"BNP\" in the last 72 hours.    Pulse Ox: SpO2  Av.8 %  Min: 85 %  Max: 98 %  Supplemental O2:       Current Meds: rivaroxaban (XARELTO) tablet 15 mg, Dinner  dilTIAZem 125 mg in sodium chloride 0.9 % 125 mL infusion, Continuous  valsartan (DIOVAN) tablet 40 mg, Daily  0.9 % sodium chloride infusion, Continuous  sodium chloride flush 0.9 % injection 10 mL, 2 times per day  sodium chloride flush 0.9 % injection 10 mL, PRN  0.9 % sodium chloride infusion, PRN  potassium chloride (KLOR-CON M) extended release tablet 40 mEq, PRN   Or  potassium bicarb-citric acid (EFFER-K) effervescent tablet 40 mEq, PRN   Or  potassium chloride 10 mEq/100 mL IVPB (Peripheral Line), PRN  magnesium sulfate 1000 mg in dextrose 5% 100 mL IVPB, PRN  ondansetron (ZOFRAN-ODT) disintegrating tablet 4 mg, Q8H PRN   Or  ondansetron (ZOFRAN) injection 4 mg, Q6H PRN  senna (SENOKOT) tablet 8.6 mg, BID PRN  acetaminophen (TYLENOL) tablet 650 mg, Q6H PRN   Or  acetaminophen (TYLENOL) suppository 650 mg, Q6H PRN  furosemide 
Writer called report to Ming ([phone]) for [facility] room B669. Ming verbalized understanding.   
reduced systolic function.    Aortic Valve: Trileaflet valve. Mildly calcified cusp. Mild regurgitation.    Mitral Valve: Moderate regurgitation.    Tricuspid Valve: Moderate regurgitation. Moderately elevated RVSP, consistent with moderate pulmonary hypertension.    Pericardium: No pericardial effusion.    Image quality is good.    Impression /recommendations:    Shortness of breath likely related to A-fib exacerbation and pulmonary edema doubt amiodarone toxicity   A-fib control per cardiology  Diuresis Lasix 40 IV every 12 hours  On Xarelto     basilar lung mild fibrosis/groundglass opacities   Reviewed all images above and PFTs that showed mild restrictive lung  disease with significant diffusion  impairment stable since 2019 PFT.  Patient has been on amiodarone for 1-1/2 years stopped in September 2023 and images showed worsening bibasilar scarring although mild in 2023 CTs compared to 2019 CT  Chest as above.  Radiologist does not feel changes significant based on CT report from [facility] December 2023.  Patient denies history of worsening on amiodarone however  I feel that the changes are worse on CT scan since 2019.  Therefore if amiodarone has to be initiated careful follow-up with CTs / PFT should be done.I called and discussed with Dr Kaplan about all above  He wants to hold off amiodarone at this point to discuss with Dr. Llanos consider uploading images to [facility] for  detailed comparison .  Also Dr Selby from pulmonary stated in his note that patient had shortness of breath on amiodarone that improved on cessation of amiodarone when she lost saw him in December of 2023 and CT chest was done at that time. Patient on this presentation claims that she  did not feel worse on amiodarone.        Systolic heart failure exacerbation/moderate MR/secondary pulmonary hypertension   Follow-up echocardiogram per cardiology   Patient underwent right heart catheterization before in 2019  to  
        Cardiology attending note  I have not seen patient personally but nurse practitioner discussed care with me.  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  Will need [facility] guided cardioversion also has worsening PFTs on amiodarone and will need further evaluation at [facility] for amiodarone lung toxicity.  At this point of time we should leave her off amiodarone.  Will continue Xarelto.  Transfer is being arranged at this point of time         
01/30/2024    AST 33 01/30/2024    ALT 22 01/30/2024      Hepatic:   Lab Results   Component Value Date    AST 33 01/30/2024    ALT 22 01/30/2024    BILITOT 0.9 01/30/2024    ALKPHOS 70 01/30/2024     BNP: No results found for: \"BNP\"  Lipids: No results found for: \"CHOL\", \"HDL\"  INR:   Lab Results   Component Value Date    INR 1.6 05/06/2023     PTH: No results found for: \"PTH\"  Phosphorus:    Lab Results   Component Value Date    PHOS 4.3 01/30/2024     Ionized Calcium: No results found for: \"IONCA\"  Magnesium:   Lab Results   Component Value Date    MG 1.8 03/08/2024     Albumin:   Lab Results   Component Value Date    LABALBU NEG 01/31/2024     Last 3 CK, CKMB, Troponin: @LABRCNT(CKTOTAL:3,CKMB:3,TROPONINI:3)       URINE:)No results found for: \"NAUR\", \"PROTUR\"    Radiology:   Reviewed.    Assessment:  Atrial fibrillation with RVR  Hypotension  Acute kidney injury  Chronic kidney disease stage IIIb    Plan: Normal saline 75 mill an hour  Monitor urine output and renal  Avoid IV contrast  Patient is being evaluated for cardioversion  Monitor electrolytes  Avoid hypotension, nephrotoxic drugs, Lovenox, Fleets enema and IV contrast exposure.  Follow up labs ordered for AM. Please call on call nephrologist if iv contrast is needed    Please do not hesitate to call with questions. We will follow with you.      Electronically signed by David Cho MD  on 3/11/2024 at 11:41 AM  
--   --  9.4 9.3   PROBNP 6,378*  --   --  5,123* 3,443*   TROPHS 20* 17* 18*  --   --    MYOGLOBIN 80* 71* 75*  --   --        No results for input(s): \"PROT\", \"LABALBU\", \"LABA1C\", \"L5KIXBE\", \"C3GIXQP\", \"FT4\", \"TSH\", \"AST\", \"ALT\", \"LDH\", \"GGT\", \"ALKPHOS\", \"LABGGT\", \"BILITOT\", \"BILIDIR\", \"AMMONIA\", \"AMYLASE\", \"LIPASE\", \"LACTATE\", \"CHOL\", \"HDL\", \"LDLCHOLESTEROL\", \"CHOLHDLRATIO\", \"TRIG\", \"VLDL\", \"TJZ22WU\", \"PHENYTOIN\", \"PHENYF\", \"URICACID\", \"POCGLU\" in the last 72 hours.    Lab Results   Component Value Date    INR 1.6 05/06/2023    PROTIME 18.3 (H) 05/06/2023       Lab Results   Component Value Date/Time    SPECIAL NOT REPORTED 01/30/2015 10:44 AM     No results found for: \"CULTURE\"    Lab Results   Component Value Date/Time    POCPH 7.433 05/16/2018 01:36 PM    POCPCO2 38.3 05/16/2018 01:36 PM    POCPO2 78.9 05/16/2018 01:36 PM    POCHCO3 25.6 05/16/2018 01:36 PM    NBEA NOT REPORTED 05/16/2018 01:36 PM    PBEA 1 05/16/2018 01:36 PM    APD7TIQ 27 05/16/2018 01:36 PM    AUOX9SUF 96 05/16/2018 01:36 PM    FIO2 NOT REPORTED 05/16/2018 01:36 PM       Radiology:    XR CHEST PORTABLE    Result Date: 3/8/2024  1. Findings suggestive of mild CHF/volume overload. 2. No consolidation or large pleural effusion.         All radiological studies reviewed                Code Status:  Full Code    Electronically signed by Frank Woo MD on 3/10/2024 at 2:31 PM     Copy sent to Franci Peraza MD    This note was created with the assistance of a speech-recognition program.  Although the intention is to generate a document that actually reflects the content of the visit, no guarantees can be provided that every mistake has been identified and corrected by editing.     Note was updated later by me after  physical examination and  completion of the assessment.   
None  Education Outcome: Verbalized understanding      Therapy Time   Individual Concurrent Group Co-treatment   Time In 1021         Time Out 1052         Minutes 31 + 10 = 41 minutes          Additional 10 minutes for chart review.  Treatment Time: 23 minutes       Deja Escobar PT         
been identified and corrected by editing.     Note was updated later by me after  physical examination and  completion of the assessment.

## 2024-04-03 ENCOUNTER — APPOINTMENT (OUTPATIENT)
Dept: CT IMAGING | Age: 74
End: 2024-04-03
Payer: MEDICARE

## 2024-04-03 ENCOUNTER — HOSPITAL ENCOUNTER (EMERGENCY)
Age: 74
Discharge: HOME | End: 2024-04-03
Attending: EMERGENCY MEDICINE
Payer: MEDICARE

## 2024-04-03 VITALS
BODY MASS INDEX: 23.57 KG/M2 | DIASTOLIC BLOOD PRESSURE: 80 MMHG | OXYGEN SATURATION: 97 % | HEART RATE: 60 BPM | RESPIRATION RATE: 16 BRPM | TEMPERATURE: 97.9 F | WEIGHT: 155 LBS | SYSTOLIC BLOOD PRESSURE: 174 MMHG

## 2024-04-03 DIAGNOSIS — K59.00 CONSTIPATION, UNSPECIFIED CONSTIPATION TYPE: ICD-10-CM

## 2024-04-03 DIAGNOSIS — R10.13 ABDOMINAL PAIN, EPIGASTRIC: Primary | ICD-10-CM

## 2024-04-03 LAB
ALBUMIN SERPL-MCNC: 4.3 G/DL (ref 3.5–5.2)
ALP SERPL-CCNC: 79 U/L (ref 35–104)
ALT SERPL-CCNC: 19 U/L (ref 5–33)
ANION GAP SERPL CALCULATED.3IONS-SCNC: 10 MMOL/L (ref 9–17)
AST SERPL-CCNC: 27 U/L
BASOPHILS # BLD: 0 K/UL (ref 0–0.2)
BASOPHILS NFR BLD: 0 %
BILIRUB DIRECT SERPL-MCNC: 0.2 MG/DL
BILIRUB INDIRECT SERPL-MCNC: 0.5 MG/DL (ref 0–1)
BILIRUB SERPL-MCNC: 0.7 MG/DL (ref 0.3–1.2)
BILIRUB UR QL STRIP: NEGATIVE
BUN SERPL-MCNC: 19 MG/DL (ref 8–23)
BUN/CREAT SERPL: 16 (ref 9–20)
CALCIUM SERPL-MCNC: 9.6 MG/DL (ref 8.6–10.4)
CHLORIDE SERPL-SCNC: 107 MMOL/L (ref 98–107)
CLARITY UR: CLEAR
CO2 SERPL-SCNC: 24 MMOL/L (ref 20–31)
COLOR UR: YELLOW
CREAT SERPL-MCNC: 1.2 MG/DL (ref 0.5–0.9)
EOSINOPHIL # BLD: 0.13 K/UL (ref 0–0.4)
EOSINOPHILS RELATIVE PERCENT: 3 % (ref 1–4)
EPI CELLS #/AREA URNS HPF: ABNORMAL /HPF (ref 0–5)
ERYTHROCYTE [DISTWIDTH] IN BLOOD BY AUTOMATED COUNT: 14.1 % (ref 11.8–14.4)
GFR SERPL CREATININE-BSD FRML MDRD: 47 ML/MIN/1.73M2
GLUCOSE SERPL-MCNC: 101 MG/DL (ref 70–99)
GLUCOSE UR STRIP-MCNC: NEGATIVE MG/DL
HCT VFR BLD AUTO: 41.8 % (ref 36.3–47.1)
HGB BLD-MCNC: 13.6 G/DL (ref 11.9–15.1)
HGB UR QL STRIP.AUTO: ABNORMAL
IMM GRANULOCYTES # BLD AUTO: 0 K/UL (ref 0–0.3)
IMM GRANULOCYTES NFR BLD: 0 %
KETONES UR STRIP-MCNC: NEGATIVE MG/DL
LEUKOCYTE ESTERASE UR QL STRIP: NEGATIVE
LIPASE SERPL-CCNC: 23 U/L (ref 13–60)
LYMPHOCYTES NFR BLD: 0.55 K/UL (ref 1–4.8)
LYMPHOCYTES RELATIVE PERCENT: 13 % (ref 24–44)
MCH RBC QN AUTO: 30.7 PG (ref 25.2–33.5)
MCHC RBC AUTO-ENTMCNC: 32.5 G/DL (ref 28.4–34.8)
MCV RBC AUTO: 94.4 FL (ref 82.6–102.9)
MONOCYTES NFR BLD: 0.46 K/UL (ref 0.2–0.8)
MONOCYTES NFR BLD: 11 % (ref 1–7)
NEUTROPHILS NFR BLD: 73 % (ref 36–66)
NEUTS SEG NFR BLD: 3.06 K/UL (ref 1.8–7.7)
NITRITE UR QL STRIP: NEGATIVE
NRBC BLD-RTO: 0 PER 100 WBC
PH UR STRIP: 6 [PH] (ref 5–8)
PLATELET # BLD AUTO: 143 K/UL (ref 138–453)
PMV BLD AUTO: 10.2 FL (ref 8.1–13.5)
POTASSIUM SERPL-SCNC: 4.6 MMOL/L (ref 3.7–5.3)
PROT SERPL-MCNC: 7.1 G/DL (ref 6.4–8.3)
PROT UR STRIP-MCNC: NEGATIVE MG/DL
RBC # BLD AUTO: 4.43 M/UL (ref 3.95–5.11)
RBC #/AREA URNS HPF: ABNORMAL /HPF (ref 0–2)
SODIUM SERPL-SCNC: 141 MMOL/L (ref 135–144)
SP GR UR STRIP: 1.01 (ref 1–1.03)
UROBILINOGEN UR STRIP-ACNC: NORMAL EU/DL (ref 0–1)
WBC #/AREA URNS HPF: ABNORMAL /HPF (ref 0–5)
WBC OTHER # BLD: 4.2 K/UL (ref 3.5–11.3)

## 2024-04-03 PROCEDURE — 80048 BASIC METABOLIC PNL TOTAL CA: CPT

## 2024-04-03 PROCEDURE — 6360000004 HC RX CONTRAST MEDICATION: Performed by: NURSE PRACTITIONER

## 2024-04-03 PROCEDURE — 83690 ASSAY OF LIPASE: CPT

## 2024-04-03 PROCEDURE — 2580000003 HC RX 258: Performed by: NURSE PRACTITIONER

## 2024-04-03 PROCEDURE — 2500000003 HC RX 250 WO HCPCS: Performed by: NURSE PRACTITIONER

## 2024-04-03 PROCEDURE — 99285 EMERGENCY DEPT VISIT HI MDM: CPT

## 2024-04-03 PROCEDURE — 6370000000 HC RX 637 (ALT 250 FOR IP): Performed by: NURSE PRACTITIONER

## 2024-04-03 PROCEDURE — A4216 STERILE WATER/SALINE, 10 ML: HCPCS | Performed by: NURSE PRACTITIONER

## 2024-04-03 PROCEDURE — 81001 URINALYSIS AUTO W/SCOPE: CPT

## 2024-04-03 PROCEDURE — 96374 THER/PROPH/DIAG INJ IV PUSH: CPT

## 2024-04-03 PROCEDURE — 80076 HEPATIC FUNCTION PANEL: CPT

## 2024-04-03 PROCEDURE — 85025 COMPLETE CBC W/AUTO DIFF WBC: CPT

## 2024-04-03 PROCEDURE — 96375 TX/PRO/DX INJ NEW DRUG ADDON: CPT

## 2024-04-03 PROCEDURE — 6360000002 HC RX W HCPCS: Performed by: NURSE PRACTITIONER

## 2024-04-03 PROCEDURE — 74177 CT ABD & PELVIS W/CONTRAST: CPT

## 2024-04-03 RX ORDER — SODIUM CHLORIDE 0.9 % (FLUSH) 0.9 %
10 SYRINGE (ML) INJECTION PRN
Status: DISCONTINUED | OUTPATIENT
Start: 2024-04-03 | End: 2024-04-03 | Stop reason: HOSPADM

## 2024-04-03 RX ORDER — SODIUM CHLORIDE 9 MG/ML
INJECTION, SOLUTION INTRAVENOUS CONTINUOUS
Status: DISCONTINUED | OUTPATIENT
Start: 2024-04-03 | End: 2024-04-03 | Stop reason: HOSPADM

## 2024-04-03 RX ORDER — ONDANSETRON 2 MG/ML
4 INJECTION INTRAMUSCULAR; INTRAVENOUS ONCE
Status: COMPLETED | OUTPATIENT
Start: 2024-04-03 | End: 2024-04-03

## 2024-04-03 RX ORDER — POLYETHYLENE GLYCOL 3350 17 G/17G
17 POWDER, FOR SOLUTION ORAL DAILY PRN
Qty: 510 G | Refills: 0 | Status: SHIPPED | OUTPATIENT
Start: 2024-04-03 | End: 2024-05-03

## 2024-04-03 RX ORDER — AMIODARONE HYDROCHLORIDE 200 MG/1
200 TABLET ORAL 2 TIMES DAILY
COMMUNITY

## 2024-04-03 RX ORDER — 0.9 % SODIUM CHLORIDE 0.9 %
80 INTRAVENOUS SOLUTION INTRAVENOUS ONCE
Status: COMPLETED | OUTPATIENT
Start: 2024-04-03 | End: 2024-04-03

## 2024-04-03 RX ORDER — OMEPRAZOLE 20 MG/1
20 CAPSULE, DELAYED RELEASE ORAL
Qty: 14 CAPSULE | Refills: 0 | Status: SHIPPED | OUTPATIENT
Start: 2024-04-03

## 2024-04-03 RX ADMIN — IOPAMIDOL 75 ML: 755 INJECTION, SOLUTION INTRAVENOUS at 12:36

## 2024-04-03 RX ADMIN — SODIUM CHLORIDE: 9 INJECTION, SOLUTION INTRAVENOUS at 11:58

## 2024-04-03 RX ADMIN — SODIUM CHLORIDE, PRESERVATIVE FREE 10 ML: 5 INJECTION INTRAVENOUS at 12:36

## 2024-04-03 RX ADMIN — ALUMINUM HYDROXIDE, MAGNESIUM HYDROXIDE, AND SIMETHICONE: 1200; 120; 1200 SUSPENSION ORAL at 13:48

## 2024-04-03 RX ADMIN — FAMOTIDINE 20 MG: 10 INJECTION, SOLUTION INTRAVENOUS at 11:57

## 2024-04-03 RX ADMIN — ONDANSETRON 4 MG: 2 INJECTION INTRAMUSCULAR; INTRAVENOUS at 11:58

## 2024-04-03 RX ADMIN — SODIUM CHLORIDE 80 ML: 9 INJECTION, SOLUTION INTRAVENOUS at 12:36

## 2024-04-03 ASSESSMENT — PAIN DESCRIPTION - DESCRIPTORS: DESCRIPTORS: SHARP;ACHING

## 2024-04-03 ASSESSMENT — ENCOUNTER SYMPTOMS
DIARRHEA: 0
SORE THROAT: 0
NAUSEA: 0
ABDOMINAL PAIN: 1
COLOR CHANGE: 0
TROUBLE SWALLOWING: 0
SHORTNESS OF BREATH: 0
BACK PAIN: 0
COUGH: 0
VOMITING: 0

## 2024-04-03 ASSESSMENT — PAIN DESCRIPTION - LOCATION: LOCATION: ABDOMEN

## 2024-04-03 ASSESSMENT — PAIN SCALES - GENERAL: PAINLEVEL_OUTOF10: 6

## 2024-04-03 ASSESSMENT — PAIN - FUNCTIONAL ASSESSMENT: PAIN_FUNCTIONAL_ASSESSMENT: 0-10

## 2024-04-03 ASSESSMENT — PAIN DESCRIPTION - FREQUENCY: FREQUENCY: CONTINUOUS

## 2024-04-03 NOTE — ED NOTES
Pt presenting to the ED with complaints of mid-epigastric pain. Pt reports this pain started last night around 7pm. Pt is A&ox4.

## 2024-04-04 NOTE — ED PROVIDER NOTES
eMERGENCY dEPARTMENT eNCOUnter   Independent Attestation     Pt Name: Theodora Webb  MRN: [id number]  Birthdate 1950  Date of evaluation: 4/4/24     Theodora Webb is a 74 y.o. female with CC: Abdominal Pain (Onset 7pm last night)      Based on the medical record the care appears appropriate.  I was personally available for consultation in the Emergency Department.    Bay Schwartz MD  Attending Emergency Physician                  Bay Schwartz MD  04/04/24 1131    
epigastric    2. Constipation, unspecified constipation type            Problem List  Patient Active Problem List   Diagnosis Code    Paroxysmal A-fib ([facility]) I48.0    Primary osteoarthritis involving multiple joints M15.9    Dysthymia F34.1    Tricuspid regurgitation I07.1    Osteoarthritis M19.90    Spinal stenosis at L4-L5 level M48.061    Dyspnea on exertion R06.09    Moderate to severe pulmonary hypertension ([facility]) I27.20    JOSE on CPAP G47.33    Deep vein thrombosis (DVT) of proximal lower extremity ([facility]) I82.4Y9    Chronic 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 ([facility])  I82.531    Embolism and thrombosis of both popliteal veins ([facility])  I82.433    Status post cardiac catheterization Z98.890    Asthmatic bronchitis, unspecified asthma severity, uncomplicated J45.909    Balance problems R26.89    Chronic combined systolic and diastolic CHF (congestive heart failure) ([facility]) I50.42    Acute kidney injury superimposed on CKD ([facility]) N17.9, N18.9    Essential hypertension I10    Gait disturbance R26.9    Idiopathic peripheral neuropathy G60.9    Laryngopharyngeal reflux (LPR) K21.9    Leg length discrepancy M21.70    Limp R26.89    Peripheral polyneuropathy G62.9    Primary osteoarthritis of right hip M16.11    Pulmonary scarring J98.4    Restrictive lung disease J98.4    Enthesopathy M77.9    Kyphosis of thoracic region M40.204    Spondylolisthesis at L5-S1 level M43.17    Spondylosis of lumbar region without myelopathy or radiculopathy M47.816    Supraventricular tachycardia ([facility]) I47.10    Trochanteric bursitis of right hip M70.61    Valvular heart disease I38    Osteoarthritis of knee M17.9    Acute on chronic combined systolic and diastolic CHF (congestive heart failure) ([facility]) I50.43    Atrial fibrillation with rapid ventricular response ([facility]) I48.91    Localized osteoarthritis of left shoulder M19.012    A-fib ([facility]) I48.91         DISPOSITION/PLAN   DISPOSITION Decision To Discharge 04/03/2024 03:24:24

## 2024-09-19 ENCOUNTER — HOSPITAL ENCOUNTER (EMERGENCY)
Age: 74
Discharge: HOME | End: 2024-09-19
Attending: EMERGENCY MEDICINE
Payer: MEDICARE

## 2024-09-19 ENCOUNTER — APPOINTMENT (OUTPATIENT)
Dept: GENERAL RADIOLOGY | Age: 74
End: 2024-09-19
Payer: MEDICARE

## 2024-09-19 VITALS
DIASTOLIC BLOOD PRESSURE: 72 MMHG | RESPIRATION RATE: 18 BRPM | OXYGEN SATURATION: 99 % | TEMPERATURE: 98.2 F | WEIGHT: 155 LBS | SYSTOLIC BLOOD PRESSURE: 142 MMHG | BODY MASS INDEX: 22.96 KG/M2 | HEIGHT: 69 IN | HEART RATE: 65 BPM

## 2024-09-19 DIAGNOSIS — S82.891A CLOSED FRACTURE OF RIGHT ANKLE, INITIAL ENCOUNTER: Primary | ICD-10-CM

## 2024-09-19 PROCEDURE — 29515 APPLICATION SHORT LEG SPLINT: CPT

## 2024-09-19 PROCEDURE — 73630 X-RAY EXAM OF FOOT: CPT

## 2024-09-19 PROCEDURE — 73610 X-RAY EXAM OF ANKLE: CPT

## 2024-09-19 PROCEDURE — 99283 EMERGENCY DEPT VISIT LOW MDM: CPT

## 2024-10-03 ENCOUNTER — HOSPITAL ENCOUNTER (OUTPATIENT)
Dept: GENERAL RADIOLOGY | Age: 74
Discharge: HOME | End: 2024-10-05
Payer: MEDICARE

## 2024-10-03 ENCOUNTER — HOSPITAL ENCOUNTER (OUTPATIENT)
Age: 74
Discharge: HOME | End: 2024-10-05
Payer: MEDICARE

## 2024-10-03 DIAGNOSIS — S82.62XA CLOSED FRACTURE OF DISTAL LATERAL MALLEOLUS OF ANKLE, LEFT, INITIAL ENCOUNTER: ICD-10-CM

## 2024-10-03 PROCEDURE — 73630 X-RAY EXAM OF FOOT: CPT

## 2024-10-03 PROCEDURE — 73610 X-RAY EXAM OF ANKLE: CPT

## 2024-10-24 ENCOUNTER — HOSPITAL ENCOUNTER (OUTPATIENT)
Age: 74
Discharge: HOME | End: 2024-10-26
Payer: MEDICARE

## 2024-10-24 ENCOUNTER — HOSPITAL ENCOUNTER (OUTPATIENT)
Dept: GENERAL RADIOLOGY | Age: 74
Discharge: HOME | End: 2024-10-26
Payer: MEDICARE

## 2024-10-24 DIAGNOSIS — S82.839A AVULSION FRACTURE OF DISTAL FIBULA: ICD-10-CM

## 2024-10-24 PROCEDURE — 73610 X-RAY EXAM OF ANKLE: CPT

## 2024-11-12 ENCOUNTER — HOSPITAL ENCOUNTER (OUTPATIENT)
Dept: GENERAL RADIOLOGY | Age: 74
Discharge: HOME | End: 2024-11-14
Payer: MEDICARE

## 2024-11-12 ENCOUNTER — HOSPITAL ENCOUNTER (OUTPATIENT)
Age: 74
Discharge: HOME | End: 2024-11-14
Payer: MEDICARE

## 2024-11-12 DIAGNOSIS — S82.61XD: ICD-10-CM

## 2024-11-12 PROCEDURE — 73610 X-RAY EXAM OF ANKLE: CPT

## 2024-12-15 ENCOUNTER — HOSPITAL ENCOUNTER (EMERGENCY)
Age: 74
Discharge: HOME | End: 2024-12-15
Attending: EMERGENCY MEDICINE
Payer: MEDICARE

## 2024-12-15 ENCOUNTER — APPOINTMENT (OUTPATIENT)
Dept: GENERAL RADIOLOGY | Age: 74
End: 2024-12-15
Payer: MEDICARE

## 2024-12-15 VITALS
SYSTOLIC BLOOD PRESSURE: 140 MMHG | HEART RATE: 63 BPM | RESPIRATION RATE: 16 BRPM | BODY MASS INDEX: 25.11 KG/M2 | DIASTOLIC BLOOD PRESSURE: 93 MMHG | HEIGHT: 67 IN | OXYGEN SATURATION: 100 % | WEIGHT: 160 LBS | TEMPERATURE: 97.7 F

## 2024-12-15 DIAGNOSIS — M79.604 RIGHT LEG PAIN: Primary | ICD-10-CM

## 2024-12-15 PROCEDURE — 6370000000 HC RX 637 (ALT 250 FOR IP): Performed by: EMERGENCY MEDICINE

## 2024-12-15 PROCEDURE — 99283 EMERGENCY DEPT VISIT LOW MDM: CPT

## 2024-12-15 PROCEDURE — 73552 X-RAY EXAM OF FEMUR 2/>: CPT

## 2024-12-15 RX ORDER — METHOCARBAMOL 500 MG/1
1000 TABLET, FILM COATED ORAL ONCE
Status: COMPLETED | OUTPATIENT
Start: 2024-12-15 | End: 2024-12-15

## 2024-12-15 RX ORDER — HYDROCODONE BITARTRATE AND ACETAMINOPHEN 5; 325 MG/1; MG/1
1 TABLET ORAL EVERY 6 HOURS PRN
Qty: 12 TABLET | Refills: 0 | Status: SHIPPED | OUTPATIENT
Start: 2024-12-15 | End: 2024-12-18

## 2024-12-15 RX ORDER — LIDOCAINE 4 G/G
1 PATCH TOPICAL ONCE
Status: DISCONTINUED | OUTPATIENT
Start: 2024-12-15 | End: 2024-12-15 | Stop reason: HOSPADM

## 2024-12-15 RX ORDER — IBUPROFEN 800 MG/1
800 TABLET, FILM COATED ORAL ONCE
Status: COMPLETED | OUTPATIENT
Start: 2024-12-15 | End: 2024-12-15

## 2024-12-15 RX ORDER — HYDROCODONE BITARTRATE AND ACETAMINOPHEN 5; 325 MG/1; MG/1
1 TABLET ORAL ONCE
Status: COMPLETED | OUTPATIENT
Start: 2024-12-15 | End: 2024-12-15

## 2024-12-15 RX ADMIN — IBUPROFEN 800 MG: 800 TABLET ORAL at 10:52

## 2024-12-15 RX ADMIN — METHOCARBAMOL 1000 MG: 500 TABLET ORAL at 10:53

## 2024-12-15 RX ADMIN — HYDROCODONE BITARTRATE AND ACETAMINOPHEN 1 TABLET: 5; 325 TABLET ORAL at 11:42

## 2024-12-15 ASSESSMENT — PAIN SCALES - GENERAL
PAINLEVEL_OUTOF10: 4
PAINLEVEL_OUTOF10: 6
PAINLEVEL_OUTOF10: 0

## 2024-12-15 ASSESSMENT — PAIN DESCRIPTION - LOCATION
LOCATION: LEG

## 2024-12-15 ASSESSMENT — PAIN DESCRIPTION - ORIENTATION
ORIENTATION: RIGHT;POSTERIOR
ORIENTATION: RIGHT;POSTERIOR
ORIENTATION: RIGHT

## 2024-12-15 ASSESSMENT — PAIN DESCRIPTION - DESCRIPTORS
DESCRIPTORS: ACHING;SHARP
DESCRIPTORS: SHOOTING;ACHING

## 2024-12-15 NOTE — ED PROVIDER NOTES
or here.  XR FEMUR RIGHT (MIN 2 VIEWS)   Final Result   No acute osseous abnormality.      Degenerative changes right hip           LABS: All lab results were reviewed by myself, and all abnormals are listed below.  Labs Reviewed - No data to display  EMERGENCY DEPARTMENT COURSE:   Vitals:    Vitals:    12/15/24 0947   BP: (!) 140/93   Pulse: 63   Resp: 16   Temp: 97.7 °F (36.5 °C)   SpO2: 100%   Weight: 72.6 kg (160 lb)   Height: 1.702 m (5' 7\")       The patient was given the following medications while in the emergency department:  Orders Placed This Encounter   Medications    ibuprofen (ADVIL;MOTRIN) tablet 800 mg    methocarbamol (ROBAXIN) tablet 1,000 mg    lidocaine 4 % external patch 1 patch    HYDROcodone-acetaminophen (NORCO) 5-325 MG per tablet 1 tablet    HYDROcodone-acetaminophen (NORCO) 5-325 MG per tablet     Sig: Take 1 tablet by mouth every 6 hours as needed for Pain for up to 3 days. Intended supply: 3 days. Take lowest dose possible to manage pain Max Daily Amount: 4 tablets     Dispense:  12 tablet     Refill:  0     CONSULTS:  None    FINAL IMPRESSION      1. Right leg pain          DISPOSITION/PLAN   DISPOSITION Decision To Discharge 12/15/2024 12:41:30 PM   DISPOSITION CONDITION Stable           PATIENT REFERRED TO:  Franci Richardson MD  [address] [facility]  [address]  [place] [address]  [phone]          DISCHARGE MEDICATIONS:  Current Discharge Medication List        START taking these medications    Details   HYDROcodone-acetaminophen (NORCO) 5-325 MG per tablet Take 1 tablet by mouth every 6 hours as needed for Pain for up to 3 days. Intended supply: 3 days. Take lowest dose possible to manage pain Max Daily Amount: 4 tablets  Qty: 12 tablet, Refills: 0    Associated Diagnoses: Right leg pain           Gregorio Cui MD  Attending Emergency Physician  [employer] voice recognition software used in portions of this document.                   Gregorio Cui MD  12/15/24 3604

## 2025-02-04 ENCOUNTER — HOSPITAL ENCOUNTER (OUTPATIENT)
Dept: PREADMISSION TESTING | Age: 75
Discharge: HOME | End: 2025-02-08
Payer: MEDICARE

## 2025-02-04 VITALS
TEMPERATURE: 97.1 F | OXYGEN SATURATION: 98 % | SYSTOLIC BLOOD PRESSURE: 155 MMHG | WEIGHT: 156 LBS | BODY MASS INDEX: 24.48 KG/M2 | HEIGHT: 67 IN | RESPIRATION RATE: 18 BRPM | DIASTOLIC BLOOD PRESSURE: 70 MMHG | HEART RATE: 69 BPM

## 2025-02-04 DIAGNOSIS — Z01.818 PRE-OP TESTING: Primary | ICD-10-CM

## 2025-02-04 LAB
ANION GAP SERPL CALCULATED.3IONS-SCNC: 9 MMOL/L (ref 9–16)
BUN SERPL-MCNC: 20 MG/DL (ref 8–23)
CALCIUM SERPL-MCNC: 9.6 MG/DL (ref 8.6–10.4)
CHLORIDE SERPL-SCNC: 109 MMOL/L (ref 98–107)
CO2 SERPL-SCNC: 26 MMOL/L (ref 20–31)
CREAT SERPL-MCNC: 1.2 MG/DL (ref 0.6–0.9)
ERYTHROCYTE [DISTWIDTH] IN BLOOD BY AUTOMATED COUNT: 13.8 % (ref 11.8–14.4)
GFR, ESTIMATED: 47 ML/MIN/1.73M2
GLUCOSE SERPL-MCNC: 100 MG/DL (ref 74–99)
HCT VFR BLD AUTO: 39.8 % (ref 36.3–47.1)
HGB BLD-MCNC: 12.5 G/DL (ref 11.9–15.1)
MCH RBC QN AUTO: 30.6 PG (ref 25.2–33.5)
MCHC RBC AUTO-ENTMCNC: 31.4 G/DL (ref 28.4–34.8)
MCV RBC AUTO: 97.5 FL (ref 82.6–102.9)
NRBC BLD-RTO: 0 PER 100 WBC
PLATELET # BLD AUTO: 154 K/UL (ref 138–453)
PMV BLD AUTO: 10.4 FL (ref 8.1–13.5)
POTASSIUM SERPL-SCNC: 4.4 MMOL/L (ref 3.7–5.3)
RBC # BLD AUTO: 4.08 M/UL (ref 3.95–5.11)
SODIUM SERPL-SCNC: 144 MMOL/L (ref 136–145)
WBC OTHER # BLD: 4.2 K/UL (ref 3.5–11.3)

## 2025-02-04 PROCEDURE — 80048 BASIC METABOLIC PNL TOTAL CA: CPT

## 2025-02-04 PROCEDURE — 85027 COMPLETE CBC AUTOMATED: CPT

## 2025-02-04 PROCEDURE — 36415 COLL VENOUS BLD VENIPUNCTURE: CPT

## 2025-02-04 RX ORDER — POLYETHYLENE GLYCOL 3350 17 G/17G
17 POWDER, FOR SOLUTION ORAL DAILY
COMMUNITY

## 2025-02-04 RX ORDER — CEFAZOLIN SODIUM/WATER 2 G/20 ML
2000 SYRINGE (ML) INTRAVENOUS ONCE
OUTPATIENT
Start: 2025-02-18

## 2025-02-04 NOTE — PRE-PROCEDURE INSTRUCTIONS
On the Day of Your Surgery, Tuesday, 2/18/25, Please Arrive At 0545 AM     Enter [facility] through the Main Entrance, take the lobby elevators to the second floor and check in at the Surgery Registration desk.     Continue to take your home medications as you normally do up to and including the night before surgery with the exception of blood thinning medications.    Blood Thinning Medications:  Please stop prescription blood thinning medications such as Apixaban (Eliquis); Clopidogrel (Plavix); Dabigatran (Pradaxa); Prasugrel (Effient); Rivaroxaban (Xarelto); Ticagrelor (Brilinta); Warfarin (Coumadin) only as directed by your surgeon and/or the prescribing physician    Some common examples of other medications that can thin your blood are: Aspirin, Ibuprofen (Advil, Motrin), Naproxen (Aleve), Meloxicam (Mobic), Celecoxib (Celebrex), Fish Oil, many Herbal Supplements.  These medications should usually be stopped at least 7 days prior to surgery.    Multivitamin. Stop xarelto as instructed.    Tylenol is OK to take for pain the week prior to surgery.    Failure to stop certain medications may interfere with your scheduled surgery.    If you receive instructions from your surgeon regarding what medications to stop prior to surgery, please follow those specific instructions.    Please take the following medication(s) the day of surgery with small sips of water:              Metoprolol, amiodarone.    If you are on medicare and there is a possibility that you will be admitted following surgery:   Please bring your prescription medications in their original bottles with pharmacy label on the day of surgery.    Please use your inhaler(s) if needed and bring your inhaler(s) from home the day of surgery.    Showering Before Surgery:     You can play an important role in your own health by carefully washing before surgery. Shower the night before and the morning of surgery using the instructions below. If you are

## 2025-02-04 NOTE — H&P
History and Physical Service   [facility]    HISTORY AND PHYSICAL EXAMINATION            Date of Evaluation: 2/4/2025  Patient name:  Theodora Webb  MRN:   [id number]  YOB: 1950  PCP:    Franci Richardson MD    History Obtained From:     Patient, medical records    History of Present Illness:     This is Theodora Webb a 75 y.o. female who presents for a pre-admission testing appointment for an upcoming OPEN HERNIA VENTRAL REPAIR- WITH MESH by Catalino Ta MD scheduled on 02/18/2025 at 0730 due to Vental hernia without obstruction or gangrene. The patient's chief complaint is ventral hernia that has been present for >20 years. She denies discomfort over the hernia but states it has slowly grown over the years and she decided it was time to have it evaluated. Denies changes in skin color or condition, denies reduction of the hernia, and denies changes in bowel or bladder function. Denies recent falls and injuries but states she \"has always had poor balance\".     Known hypertension, hyperlipidemia, stage 3b kidney disease (follows with Dr. Joel), asthma (patient denies), arthritis, pulmonary hypertension, atrial fibrillation s/p ablation 2021, cardioversion March 2024, nonischemic dilated cardiomyopathy, sleep apnea (does not use CPAP, tachycardia). Follows with [facility] cardiology- last seen November 15, 2024, received clearance \"As discussed with Dr. Rivers she will be at low risk for upcoming procedure, she may hold Eliquis 2 days prior to procedure and resume as soon as possible\". 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     Functional Capacity per pt:  1) Pt is not able to walk 2 city blocks on level ground without SOB.  2) Pt is not able to climb 2 flights of stairs without SOB.  3) Pt is not able to walk up a hill for 1-2 city blocks without SOB.    Past Medical History:

## 2025-02-05 ENCOUNTER — ANESTHESIA EVENT (OUTPATIENT)
Dept: OPERATING ROOM | Age: 75
End: 2025-02-05
Payer: MEDICARE

## 2025-02-17 NOTE — ANESTHESIA PRE PROCEDURE
Department of Anesthesiology  Preprocedure Note       Name:  Theodora Webb   Age:  75 y.o.  :  1950                                          MRN:  [id number]         Date:  2025      Surgeon: Surgeon(s):  Catalino Ta MD    Procedure: Procedure(s):  OPEN HERNIA VENTRAL REPAIR- WITH MESH    Medications prior to admission:   Prior to Admission medications    Medication Sig Start Date End Date Taking? Authorizing Provider   polyethylene glycol (MIRALAX) 17 g PACK packet Take 17 g by mouth daily    Ada Tinoco MD   amiodarone (CORDARONE) 200 MG tablet Take 1 tablet by mouth 2 times daily    Ada Tinoco MD   omeprazole (PRILOSEC) 20 MG delayed release capsule Take 1 capsule by mouth every morning (before breakfast) 4/3/24   Alannah Ware APRN - CNP   rivaroxaban (XARELTO) 20 MG TABS tablet Take 15 mg by mouth Daily with supper    Ada Tinoco MD   valsartan (DIOVAN) 40 MG tablet Take 1 tablet by mouth daily 22   Ada Tinoco MD   metoprolol succinate (TOPROL XL) 50 MG extended release tablet Take 2 tablets by mouth 2 times daily    Ada Tinoco MD   Cyanocobalamin (VITAMIN B12 PO) Take 1 tablet by mouth daily    Ada Tinoco MD   Multiple Vitamins-Minerals (THERAPEUTIC MULTIVITAMIN-MINERALS) tablet Take 1 tablet by mouth daily    Ada Tinoco MD   Handicap Placard MISC by Does not apply route  5 years from origninal written date 2022    Dx: Osteoarthritis unable to ambulate over 150ft 17   Jessica De León MD       Current medications:    No current facility-administered medications for this encounter.     Current Outpatient Medications   Medication Sig Dispense Refill    polyethylene glycol (MIRALAX) 17 g PACK packet Take 17 g by mouth daily      amiodarone (CORDARONE) 200 MG tablet Take 1 tablet by mouth 2 times daily      omeprazole (PRILOSEC) 20 MG delayed release capsule Take 1 capsule by mouth every morning 
22-Oct-2018 03:16

## 2025-02-17 NOTE — H&P
Interval H&P Note    Pt Name: Theodora Webb  MRN: [id number]  YOB: 1950  Date of evaluation: 02/18/2025      [x] I have reviewed in Epic the H&P by NITESH Baca CNP dated 02/04/2025, attached below for an Interval History and Physical note.     [x] I have examined  Theodora Webb, a 75 y.o. female.There are no changes to the patient who is scheduled for OPEN HERNIA VENTRAL REPAIR- WITH MESH by Catalino Ta MD for Ventral hernia without obstruction or gangrene. The patient denies new health changes, fever, chills, wheezing, cough, increased SOB, chest pain, open sores or wounds. Denies hx of diabetes. Last Xarelto 02/15/2025.     Vital signs: BP (!) 184/82   Pulse 56   Temp 96.8 °F (36 °C) (Temporal)   Resp 16   Ht 1.702 m (5' 7\")   Wt 70.8 kg (156 lb)   SpO2 96%   BMI 24.43 kg/m²     Allergies:  Patient has no known allergies.    Medications:    Prior to Admission medications    Medication Sig Start Date End Date Taking? Authorizing Provider   polyethylene glycol (MIRALAX) 17 g PACK packet Take 17 g by mouth daily   Yes Ada Tinoco MD   amiodarone (CORDARONE) 200 MG tablet Take 1 tablet by mouth 2 times daily   Yes Ada Tinoco MD   metoprolol succinate (TOPROL XL) 50 MG extended release tablet Take 2 tablets by mouth 2 times daily   Yes Ada Tinoco MD   omeprazole (PRILOSEC) 20 MG delayed release capsule Take 1 capsule by mouth every morning (before breakfast)  Patient not taking: Reported on 2/18/2025 4/3/24   Alannah Ware APRN - CNP   rivaroxaban (XARELTO) 20 MG TABS tablet Take 15 mg by mouth Daily with supper    Ada Tinoco MD   valsartan (DIOVAN) 40 MG tablet Take 1 tablet by mouth daily 12/21/22   Ada Tinoco MD   Cyanocobalamin (VITAMIN B12 PO) Take 1 tablet by mouth daily    Ada Tinoco MD   Multiple Vitamins-Minerals (THERAPEUTIC MULTIVITAMIN-MINERALS) tablet Take 1 tablet by mouth daily    Provider

## 2025-02-18 ENCOUNTER — HOSPITAL ENCOUNTER (OUTPATIENT)
Age: 75
Setting detail: OUTPATIENT SURGERY
Discharge: HOME | End: 2025-02-18
Attending: SURGERY | Admitting: SURGERY
Payer: MEDICARE

## 2025-02-18 ENCOUNTER — ANESTHESIA (OUTPATIENT)
Dept: OPERATING ROOM | Age: 75
End: 2025-02-18
Payer: MEDICARE

## 2025-02-18 VITALS
BODY MASS INDEX: 24.48 KG/M2 | OXYGEN SATURATION: 95 % | RESPIRATION RATE: 11 BRPM | TEMPERATURE: 97.7 F | HEIGHT: 67 IN | WEIGHT: 156 LBS | DIASTOLIC BLOOD PRESSURE: 69 MMHG | SYSTOLIC BLOOD PRESSURE: 154 MMHG | HEART RATE: 60 BPM

## 2025-02-18 PROCEDURE — 3700000001 HC ADD 15 MINUTES (ANESTHESIA): Performed by: SURGERY

## 2025-02-18 PROCEDURE — 7100000001 HC PACU RECOVERY - ADDTL 15 MIN: Performed by: SURGERY

## 2025-02-18 PROCEDURE — 6360000002 HC RX W HCPCS: Performed by: SURGERY

## 2025-02-18 PROCEDURE — 2500000003 HC RX 250 WO HCPCS: Performed by: ANESTHESIOLOGY

## 2025-02-18 PROCEDURE — 2580000003 HC RX 258: Performed by: ANESTHESIOLOGY

## 2025-02-18 PROCEDURE — 7100000011 HC PHASE II RECOVERY - ADDTL 15 MIN: Performed by: SURGERY

## 2025-02-18 PROCEDURE — 6360000002 HC RX W HCPCS: Performed by: ANESTHESIOLOGY

## 2025-02-18 PROCEDURE — 7100000010 HC PHASE II RECOVERY - FIRST 15 MIN: Performed by: SURGERY

## 2025-02-18 PROCEDURE — C1781 MESH (IMPLANTABLE): HCPCS | Performed by: SURGERY

## 2025-02-18 PROCEDURE — 2709999900 HC NON-CHARGEABLE SUPPLY: Performed by: SURGERY

## 2025-02-18 PROCEDURE — 7100000000 HC PACU RECOVERY - FIRST 15 MIN: Performed by: SURGERY

## 2025-02-18 PROCEDURE — 3700000000 HC ANESTHESIA ATTENDED CARE: Performed by: SURGERY

## 2025-02-18 PROCEDURE — 2500000003 HC RX 250 WO HCPCS: Performed by: SURGERY

## 2025-02-18 PROCEDURE — 3600000012 HC SURGERY LEVEL 2 ADDTL 15MIN: Performed by: SURGERY

## 2025-02-18 PROCEDURE — 3600000002 HC SURGERY LEVEL 2 BASE: Performed by: SURGERY

## 2025-02-18 DEVICE — VENTRALEX ST HERNIA PATCH, 6.4 CM (2.5"), CIRCLE
Type: IMPLANTABLE DEVICE | Site: ABDOMEN | Status: FUNCTIONAL
Brand: VENTRALEX

## 2025-02-18 RX ORDER — NALOXONE HYDROCHLORIDE 0.4 MG/ML
INJECTION, SOLUTION INTRAMUSCULAR; INTRAVENOUS; SUBCUTANEOUS PRN
Status: DISCONTINUED | OUTPATIENT
Start: 2025-02-18 | End: 2025-02-18 | Stop reason: HOSPADM

## 2025-02-18 RX ORDER — ONDANSETRON 2 MG/ML
INJECTION INTRAMUSCULAR; INTRAVENOUS
Status: DISCONTINUED | OUTPATIENT
Start: 2025-02-18 | End: 2025-02-18 | Stop reason: SDUPTHER

## 2025-02-18 RX ORDER — OXYCODONE HYDROCHLORIDE 5 MG/1
5 TABLET ORAL
Status: DISCONTINUED | OUTPATIENT
Start: 2025-02-18 | End: 2025-02-18 | Stop reason: HOSPADM

## 2025-02-18 RX ORDER — SODIUM CHLORIDE 9 MG/ML
INJECTION, SOLUTION INTRAVENOUS PRN
Status: DISCONTINUED | OUTPATIENT
Start: 2025-02-18 | End: 2025-02-18 | Stop reason: HOSPADM

## 2025-02-18 RX ORDER — SODIUM CHLORIDE 0.9 % (FLUSH) 0.9 %
5-40 SYRINGE (ML) INJECTION PRN
Status: DISCONTINUED | OUTPATIENT
Start: 2025-02-18 | End: 2025-02-18 | Stop reason: HOSPADM

## 2025-02-18 RX ORDER — FAMOTIDINE 10 MG/ML
INJECTION, SOLUTION INTRAVENOUS
Status: DISCONTINUED | OUTPATIENT
Start: 2025-02-18 | End: 2025-02-18 | Stop reason: SDUPTHER

## 2025-02-18 RX ORDER — METOCLOPRAMIDE HYDROCHLORIDE 5 MG/ML
10 INJECTION INTRAMUSCULAR; INTRAVENOUS
Status: DISCONTINUED | OUTPATIENT
Start: 2025-02-18 | End: 2025-02-18 | Stop reason: HOSPADM

## 2025-02-18 RX ORDER — PROCHLORPERAZINE EDISYLATE 5 MG/ML
10 INJECTION INTRAMUSCULAR; INTRAVENOUS
Status: DISCONTINUED | OUTPATIENT
Start: 2025-02-18 | End: 2025-02-18 | Stop reason: HOSPADM

## 2025-02-18 RX ORDER — FENTANYL CITRATE 50 UG/ML
25 INJECTION, SOLUTION INTRAMUSCULAR; INTRAVENOUS EVERY 5 MIN PRN
Status: DISCONTINUED | OUTPATIENT
Start: 2025-02-18 | End: 2025-02-18 | Stop reason: HOSPADM

## 2025-02-18 RX ORDER — SODIUM CHLORIDE 0.9 % (FLUSH) 0.9 %
5-40 SYRINGE (ML) INJECTION EVERY 12 HOURS SCHEDULED
Status: DISCONTINUED | OUTPATIENT
Start: 2025-02-18 | End: 2025-02-18 | Stop reason: HOSPADM

## 2025-02-18 RX ORDER — LABETALOL HYDROCHLORIDE 5 MG/ML
10 INJECTION, SOLUTION INTRAVENOUS ONCE
Status: CANCELLED | OUTPATIENT
Start: 2025-02-18

## 2025-02-18 RX ORDER — DIPHENHYDRAMINE HYDROCHLORIDE 50 MG/ML
INJECTION INTRAMUSCULAR; INTRAVENOUS
Status: DISCONTINUED | OUTPATIENT
Start: 2025-02-18 | End: 2025-02-18 | Stop reason: SDUPTHER

## 2025-02-18 RX ORDER — HYDROMORPHONE HYDROCHLORIDE 1 MG/ML
0.5 INJECTION, SOLUTION INTRAMUSCULAR; INTRAVENOUS; SUBCUTANEOUS EVERY 5 MIN PRN
Status: DISCONTINUED | OUTPATIENT
Start: 2025-02-18 | End: 2025-02-18 | Stop reason: HOSPADM

## 2025-02-18 RX ORDER — SODIUM CHLORIDE 9 MG/ML
INJECTION, SOLUTION INTRAVENOUS CONTINUOUS
Status: DISCONTINUED | OUTPATIENT
Start: 2025-02-18 | End: 2025-02-18

## 2025-02-18 RX ORDER — DIPHENHYDRAMINE HYDROCHLORIDE 50 MG/ML
12.5 INJECTION INTRAMUSCULAR; INTRAVENOUS
Status: DISCONTINUED | OUTPATIENT
Start: 2025-02-18 | End: 2025-02-18 | Stop reason: HOSPADM

## 2025-02-18 RX ORDER — LABETALOL HYDROCHLORIDE 5 MG/ML
10 INJECTION, SOLUTION INTRAVENOUS ONCE
Status: DISCONTINUED | OUTPATIENT
Start: 2025-02-18 | End: 2025-02-18

## 2025-02-18 RX ORDER — FENTANYL CITRATE 50 UG/ML
INJECTION, SOLUTION INTRAMUSCULAR; INTRAVENOUS
Status: DISCONTINUED | OUTPATIENT
Start: 2025-02-18 | End: 2025-02-18 | Stop reason: SDUPTHER

## 2025-02-18 RX ORDER — DEXAMETHASONE SODIUM PHOSPHATE 10 MG/ML
INJECTION, SOLUTION INTRAMUSCULAR; INTRAVENOUS
Status: DISCONTINUED | OUTPATIENT
Start: 2025-02-18 | End: 2025-02-18 | Stop reason: SDUPTHER

## 2025-02-18 RX ORDER — LIDOCAINE HYDROCHLORIDE 20 MG/ML
INJECTION, SOLUTION EPIDURAL; INFILTRATION; INTRACAUDAL; PERINEURAL
Status: DISCONTINUED | OUTPATIENT
Start: 2025-02-18 | End: 2025-02-18 | Stop reason: SDUPTHER

## 2025-02-18 RX ORDER — ROCURONIUM BROMIDE 10 MG/ML
INJECTION, SOLUTION INTRAVENOUS
Status: DISCONTINUED | OUTPATIENT
Start: 2025-02-18 | End: 2025-02-18 | Stop reason: SDUPTHER

## 2025-02-18 RX ORDER — PROPOFOL 10 MG/ML
INJECTION, EMULSION INTRAVENOUS
Status: DISCONTINUED | OUTPATIENT
Start: 2025-02-18 | End: 2025-02-18 | Stop reason: SDUPTHER

## 2025-02-18 RX ORDER — SODIUM CHLORIDE, SODIUM LACTATE, POTASSIUM CHLORIDE, CALCIUM CHLORIDE 600; 310; 30; 20 MG/100ML; MG/100ML; MG/100ML; MG/100ML
INJECTION, SOLUTION INTRAVENOUS CONTINUOUS
Status: DISCONTINUED | OUTPATIENT
Start: 2025-02-18 | End: 2025-02-18 | Stop reason: HOSPADM

## 2025-02-18 RX ORDER — LIDOCAINE HYDROCHLORIDE 10 MG/ML
1 INJECTION, SOLUTION EPIDURAL; INFILTRATION; INTRACAUDAL; PERINEURAL
Status: DISCONTINUED | OUTPATIENT
Start: 2025-02-18 | End: 2025-02-18 | Stop reason: HOSPADM

## 2025-02-18 RX ADMIN — DEXAMETHASONE SODIUM PHOSPHATE 10 MG: 10 INJECTION, SOLUTION INTRAMUSCULAR; INTRAVENOUS at 07:58

## 2025-02-18 RX ADMIN — SODIUM CHLORIDE, POTASSIUM CHLORIDE, SODIUM LACTATE AND CALCIUM CHLORIDE: 600; 310; 30; 20 INJECTION, SOLUTION INTRAVENOUS at 06:18

## 2025-02-18 RX ADMIN — FENTANYL CITRATE 100 MCG: 50 INJECTION INTRAMUSCULAR; INTRAVENOUS at 07:34

## 2025-02-18 RX ADMIN — SUGAMMADEX 200 MG: 100 INJECTION, SOLUTION INTRAVENOUS at 08:09

## 2025-02-18 RX ADMIN — Medication 2000 MG: at 07:41

## 2025-02-18 RX ADMIN — PROPOFOL 140 MG: 10 INJECTION, EMULSION INTRAVENOUS at 07:35

## 2025-02-18 RX ADMIN — ROCURONIUM BROMIDE 35 MG: 10 INJECTION, SOLUTION INTRAVENOUS at 07:36

## 2025-02-18 RX ADMIN — ONDANSETRON 4 MG: 2 INJECTION, SOLUTION INTRAMUSCULAR; INTRAVENOUS at 07:58

## 2025-02-18 RX ADMIN — DIPHENHYDRAMINE HYDROCHLORIDE 12.5 MG: 50 INJECTION INTRAMUSCULAR; INTRAVENOUS at 07:58

## 2025-02-18 RX ADMIN — FAMOTIDINE 20 MG: 10 INJECTION, SOLUTION INTRAVENOUS at 07:58

## 2025-02-18 RX ADMIN — SODIUM CHLORIDE, POTASSIUM CHLORIDE, SODIUM LACTATE AND CALCIUM CHLORIDE: 600; 310; 30; 20 INJECTION, SOLUTION INTRAVENOUS at 08:34

## 2025-02-18 RX ADMIN — LIDOCAINE HYDROCHLORIDE 100 MG: 20 INJECTION, SOLUTION EPIDURAL; INFILTRATION; INTRACAUDAL; PERINEURAL at 07:34

## 2025-02-18 ASSESSMENT — PAIN - FUNCTIONAL ASSESSMENT
PAIN_FUNCTIONAL_ASSESSMENT: 0-10
PAIN_FUNCTIONAL_ASSESSMENT: NONE - DENIES PAIN

## 2025-02-18 NOTE — OP NOTE
Operative Note      Patient: Theodora Webb  YOB: 1950  MRN: [id number]    Date of Procedure: 2/18/2025    Pre-Op Diagnosis Codes:      * Ventral hernia without obstruction or gangrene [K43.9]    Post-Op Diagnosis: Same       Procedure(s):  OPEN HERNIA VENTRAL REPAIR- WITH MESH, IINCARCERATED CONTAINING TRANSVERSE COLON, 3.3 cm    Surgeon(s):  Catalino Ta MD    Assistant:   Resident: Aydin Chadwick DO    Anesthesia: General    Estimated Blood Loss (mL): Minimal    Complications: None    Specimens:   * No specimens in log *    Implants:  Implant Name Type Inv. Item Serial No.  Lot No. LRB No. Used Action   PATCH DELROY M DIA2.5IN CIR W/ STRP [employer] ABSRB - [id number]  PATCH DELROY M DIA2.5IN CIR W/ STRP [employer] ABSRB  BARD DAVOL-WD [id number] N/A 1 Implanted         Drains: * No LDAs found *    Findings:  Infection Present At Time Of Surgery (PATOS) (choose all levels that have infection present):  No infection present  Other Findings: Chronically incarcerated ventral hernia containing transverse colon    Indications for procedure: This is a very pleasant 75-year-old female with a history of abdominal discomfort with bulging.  The patient was noted to have an upper abdominal ventral hernia.  Risks and benefits of open reduction and repair of this hernia with the possible use of mesh were discussed with the patient and verbal and written consent was obtained.    Detailed Description of Procedure:   After verbal and written consent, the patient was brought to the operating room.  After appropriate monitoring, general anesthesia was administered.  A timeout was performed with the staff in the room confirming both the patient and the procedure to be performed.  The procedure was begun with a sterile prep and drape.  Local anesthesia was infiltrated into the skin and subcutaneous tissue as a field block.  A transverse upper abdominal incision was made with a scalpel.

## 2025-02-18 NOTE — DISCHARGE INSTRUCTIONS
Patient Discharge Instructions  Discharge Date:  2/18/2025    Discharged To:  Home    Home with Home Health Care: No    RESUME ACTIVITY:      BATHING: May shower in 24 hours.  Remove gauze and tape only prior to showering.  Leave the skin glue on.  Wash incisions gently with soap and water only.  No creams/lotions/ointments over skin glue.  Apply dry dressing daily.  Abdominal binder as tolerated.    May restart Xarelto 2/19    DRIVING: No driving on narcotics    WALKING:  Yes    STAIRS:  Yes    LIFTING: Less than 15 pounds until instructed otherwise    DIET: common adult    SPECIAL INSTRUCTIONS:     May use ice pack for pain/swelling    May use Motrin or Aleve for breakthrough pain    May take Milk of Magnesia as needed for constipation      Call [phone] for follow up appointment with Dr. Ta in:  7-14 days

## 2025-02-18 NOTE — ANESTHESIA POSTPROCEDURE EVALUATION
Department of Anesthesiology  Postprocedure Note    Patient: Theodora Webb  MRN: [id number]  YOB: 1950  Date of evaluation: 2/18/2025    Procedure Summary       Date: 02/18/25 Room / Location: [address]    Anesthesia Start: 0726 Anesthesia Stop: 0825    Procedure: OPEN HERNIA VENTRAL REPAIR- WITH MESH Diagnosis:       Ventral hernia without obstruction or gangrene      (Ventral hernia without obstruction or gangrene [K43.9])    Surgeons: Catalino Ta MD Responsible Provider: Irina Roy MD    Anesthesia Type: general ASA Status: 3            Anesthesia Type: No value filed.    Adrián Phase I: Adrián Score: 10    Adrián Phase II:      Anesthesia Post Evaluation    Patient location during evaluation: PACU  Patient participation: complete - patient participated  Level of consciousness: awake and alert  Airway patency: patent  Nausea & Vomiting: no nausea and no vomiting  Cardiovascular status: hemodynamically stable  Respiratory status: acceptable  Hydration status: euvolemic  Pain management: adequate    No notable events documented.

## 2025-04-25 ENCOUNTER — TRANSCRIBE ORDERS (OUTPATIENT)
Dept: ADMINISTRATIVE | Age: 75
End: 2025-04-25

## 2025-04-25 DIAGNOSIS — Z79.899 ON AMIODARONE THERAPY: Primary | ICD-10-CM

## 2025-05-08 ENCOUNTER — HOSPITAL ENCOUNTER (OUTPATIENT)
Dept: PULMONOLOGY | Age: 75
Discharge: HOME | End: 2025-05-08
Payer: MEDICARE

## 2025-05-08 DIAGNOSIS — Z79.899 ON AMIODARONE THERAPY: ICD-10-CM

## 2025-05-08 LAB
EXPIRATORY TIME: NORMAL
FEF 25-75% %PRED-PRE: NORMAL
FEF 25-75% PRED: NORMAL
FEF 25-75-PRE: NORMAL
FEV1 %PRED-PRE: NORMAL
FEV1 PRED: NORMAL
FEV1/FVC %PRED-PRE: NORMAL
FEV1/FVC PRED: NORMAL
FEV1/FVC: NORMAL
FEV1: NORMAL
FVC %PRED-PRE: NORMAL
FVC PRED: NORMAL
FVC: NORMAL
PEF %PRED-PRE: NORMAL
PEF PRED: NORMAL
PEF-PRE: NORMAL

## 2025-05-08 PROCEDURE — 94729 DIFFUSING CAPACITY: CPT

## 2025-05-08 PROCEDURE — 94375 RESPIRATORY FLOW VOLUME LOOP: CPT

## 2025-05-08 NOTE — PROCEDURES
Order attached by provider selecting check box at top of this note and this report is final.

## (undated) DEVICE — GLOVE SURG SZ 75 CRM LTX FREE POLYISOPRENE POLYMER BEAD ANTI

## (undated) DEVICE — APPLICATOR MEDICATED 26 CC SOLUTION HI LT ORNG CHLORAPREP

## (undated) DEVICE — SOLUTION IRRIG 3000ML 0.9% SOD CHL USP UROMATIC PLAS CONT

## (undated) DEVICE — GLOVE SURG SZ 8 L12IN FNGR THK79MIL GRN LTX FREE

## (undated) DEVICE — DRESSING HYDROFIBER AQUACEL AG ADVANTAGE 3.5X10 IN

## (undated) DEVICE — SUTURE VCRL 0 L27IN ABSRB OS-6 BRAID COAT UD J534H

## (undated) DEVICE — YANKAUER,FLEXIBLE HANDLE,REGLR CAPACITY: Brand: MEDLINE INDUSTRIES, INC.

## (undated) DEVICE — SUTURE VCRL SZ 2-0 L27IN ABSRB UD L36MM CP-1 1/2 CIR REV J266H

## (undated) DEVICE — BIT DRL SCREW 3.2 MM PERIPH STRL

## (undated) DEVICE — SUTURE MCRYL SZ 3-0 L27IN ABSRB UD L24MM PS-1 3/8 CIR PRIM Y936H

## (undated) DEVICE — NEEDLE, QUINCKE, 18GX3.5": Brand: MEDLINE

## (undated) DEVICE — BLANKET WRM W40.2XL55.9IN IORT LO BODY + MISTRAL AIR

## (undated) DEVICE — PROTECTOR EYE PT SELF ADH NS OPT GRD LF

## (undated) DEVICE — BLADE,CARBON-STEEL,15,STRL,DISPOSABLE,TB: Brand: MEDLINE

## (undated) DEVICE — SUTURE FIBERWIRE SZ 2 W/ TAPERED NEEDLE BLUE L38IN NONABSORB BLU L26.5MM 1/2 CIRCLE AR7200

## (undated) DEVICE — PAD,ABDOMINAL,5"X9",ST,LF,25/BX: Brand: MEDLINE INDUSTRIES, INC.

## (undated) DEVICE — Device

## (undated) DEVICE — DRAPE,U/ SHT,SPLIT,PLAS,STERIL: Brand: MEDLINE

## (undated) DEVICE — SUTURE PROL SZ 1 L30IN NONABSORBABLE BLU L36MM CT-1 1/2 CIR 8425H

## (undated) DEVICE — GAUZE,SPONGE,FLUFF,6"X6.75",STRL,5/TRAY: Brand: MEDLINE

## (undated) DEVICE — STRYKER PERFORMANCE SERIES SAGITTAL BLADE: Brand: STRYKER PERFORMANCE SERIES

## (undated) DEVICE — CONTAINER,SPECIMEN,OR STERILE,4OZ: Brand: MEDLINE

## (undated) DEVICE — SUTURE VICRYL + SZ 3-0 L27IN ABSRB UD L26MM SH 1/2 CIR VCP416H

## (undated) DEVICE — INTENDED FOR TISSUE SEPARATION, AND OTHER PROCEDURES THAT REQUIRE A SHARP SURGICAL BLADE TO PUNCTURE OR CUT.: Brand: BARD-PARKER ® CARBON RIB-BACK BLADES

## (undated) DEVICE — LIQUIBAND RAPID ADHESIVE 36/CS 0.8ML: Brand: MEDLINE

## (undated) DEVICE — GUIDEPIN ORTH 3X75 MM SS SIMPLICITI

## (undated) DEVICE — STRIP,CLOSURE,WOUND,MEDI-STRIP,1/2X4: Brand: MEDLINE

## (undated) DEVICE — SUTURE 2-0 STRATAFIX MONOCRYL 45CM CT-1

## (undated) DEVICE — SST TWIST DRILL, AO TYPE, 2MM DIA. X 75MM: Brand: MICROAIRE®

## (undated) DEVICE — STAZ MINOR LAPAROTOMY: Brand: MEDLINE INDUSTRIES, INC.

## (undated) DEVICE — HYPODERMIC SAFETY NEEDLE: Brand: MAGELLAN

## (undated) DEVICE — ADHESIVE SKIN CLOSURE TOP 36 CC HI VISC DERMBND MINI

## (undated) DEVICE — SYRINGE MED 30ML STD CLR PLAS LUERLOCK TIP N CTRL DISP

## (undated) DEVICE — GOWN,AURORA,NONRNF,XL,30/CS: Brand: MEDLINE

## (undated) DEVICE — SKIN PREP TRAY W/CHG: Brand: MEDLINE INDUSTRIES, INC.

## (undated) DEVICE — BINDER ABD UNISX 3 PNL E PREM CNTCT CLSR L XL 46INX62INX9IN

## (undated) DEVICE — GARMENT,MEDLINE,DVT,INT,CALF,MED, GEN2: Brand: MEDLINE

## (undated) DEVICE — POSITIONER HD W8XH4XL8.5IN RASPBERRY FOAM SLT

## (undated) DEVICE — SUTURE MONOCRYL SZ 4-0 L27IN ABSRB UD L19MM PS-2 1/2 CIR PRIM Y426H

## (undated) DEVICE — COVER,MAYO STAND,XL,STERILE: Brand: MEDLINE

## (undated) DEVICE — SUTURE ETHBND EXCEL SZ 5 L30IN NONABSORBABLE GRN L48MM CCS MB47G

## (undated) DEVICE — SWABSTICK MEDICATED 10% POVIDONE IOD PVP SGL ANTISEP SAT

## (undated) DEVICE — SOLUTION ANSEP 3% PEROXIDE 8OZ TOP NS LF

## (undated) DEVICE — 4-PORT MANIFOLD: Brand: NEPTUNE 2

## (undated) DEVICE — SUTURE PROL SZ 0 L30IN NONABSORBABLE BLU L36MM CT-1 1/2 CIR 8424H

## (undated) DEVICE — SPONGE,PEANUT,XRAY,ST,SM,3/8",5/CARD: Brand: MEDLINE INDUSTRIES, INC.